# Patient Record
Sex: MALE | Race: WHITE | NOT HISPANIC OR LATINO | Employment: OTHER | URBAN - METROPOLITAN AREA
[De-identification: names, ages, dates, MRNs, and addresses within clinical notes are randomized per-mention and may not be internally consistent; named-entity substitution may affect disease eponyms.]

---

## 2022-04-14 ENCOUNTER — TRANSFERRED RECORDS (OUTPATIENT)
Dept: HEALTH INFORMATION MANAGEMENT | Facility: CLINIC | Age: 72
End: 2022-04-14

## 2022-07-07 ENCOUNTER — REFERRAL (OUTPATIENT)
Dept: TRANSPLANT | Facility: CLINIC | Age: 72
End: 2022-07-07

## 2022-07-07 DIAGNOSIS — Z01.818 PRE-TRANSPLANT EVALUATION FOR KIDNEY TRANSPLANT: ICD-10-CM

## 2022-07-07 DIAGNOSIS — N18.6 END STAGE RENAL DISEASE (H): ICD-10-CM

## 2022-07-07 DIAGNOSIS — I25.10 CARDIOVASCULAR DISEASE: ICD-10-CM

## 2022-07-07 DIAGNOSIS — Z76.82 ORGAN TRANSPLANT CANDIDATE: ICD-10-CM

## 2022-07-07 DIAGNOSIS — I10 ESSENTIAL HYPERTENSION: ICD-10-CM

## 2022-07-07 DIAGNOSIS — E78.5 HYPERLIPIDEMIA: ICD-10-CM

## 2022-07-07 DIAGNOSIS — N18.6 ESRD (END STAGE RENAL DISEASE) (H): Primary | ICD-10-CM

## 2022-07-07 DIAGNOSIS — I12.9 HYPERTENSIVE CHRONIC KIDNEY DISEASE WITH STAGE 1 THROUGH STAGE 4 CHRONIC KIDNEY DISEASE, OR UNSPECIFIED CHRONIC KIDNEY DISEASE: ICD-10-CM

## 2022-07-07 NOTE — LETTER
Ijeoma F Felicitas  [address]  [facility] [address]                July 13, 2022                                                                                        MEDICAL RECORDS REQUEST    [facility]            Thank you for referring your patient to the [facility]alth Kidney, Kidney Pancreas Program, in order to process the referral we will need the following information;    1. Demographics  2. 2728 form   3. Immunizations records  4. Providers Progress notes, (last 3 note)      Please call our office at [phone] if you have any questions or concerns.                Please fax all paper records to [phone] within 3-5 business days.      Thank you,   The SOT Referral Intake Team     [facility]  Solid Organ Transplant Office  [address] [place] [address], [address] [address]

## 2022-07-07 NOTE — LETTER
July 13,2022    Ijeoma Polk  [address] [address] [address]    Dear Ijeoma,    Thank you for your interest in the Transplant Center at [facility]. We look forward to being a part of your care team and assisting you through the transplant process.    As we discussed, your transplant coordinator is Lillie Rogel (Kidney).  You may call your coordinator at any time with questions or concerns at [phone].    Please complete the following.    1. Fill out and return the enclosed forms    Authorization for Electronic Communication    Authorization to Discuss Protected Health Information    Authorization for Release of Protected Health Information    Authorization for Care Everywhere Release of Information    2. Sign up for:    [employer], access to your electronic medical record (see enclosed pamphlet)    [email or web address], a transplant education website    You can use these tools to learn more about your transplant, communicate with your care team, and track your medical details      Sincerely,    Solid Organ Transplant  [facility]    cc: Referring Physician and PCP

## 2022-07-13 VITALS — BODY MASS INDEX: 25 KG/M2 | WEIGHT: 178.57 LBS | HEIGHT: 71 IN

## 2022-07-13 NOTE — TELEPHONE ENCOUNTER
PCP: Dr. Hugo Cohen   Referring Provider:  Dr. Juan Justin   Referring Diagnosis: CKD Stage 5     GFR/Date: 19 (6/3/22)    Is patient under the age of 65? No  Is patient diabetic? No  Is patient on insulin? No  Was patient offered a pancreas transplant referral? No    Is patient in a group home/assisted living? No  Does patient have a guardian? No    Referral intake process completed.  Patient is aware that after financial approval is received, medical records will be requested.   Patient confirmed for a callback from transplant coordinator on 7/25/22. (within 2 weeks)  Tentative evaluation date 8/1/22 (within 4 weeks) if appointment is virtual, does patient have capabilities of setting this up? No    Confirmed coordinator will discuss evaluation process in more detail at the time of their call.   Patient is aware of the need to arrange age appropriate cancer screening, vaccinations, and dental care.  Reminded patient to complete questionnaire, complete medical records release, and review packet prior to evaluation visit .  Assessed patient for special needs (ie-wheelchair, assistance, guardian, and ):  None  Patient instructed to call [phone] with questions.     Patient gave verbal consent during intake call to obtain medical records and documents outside of [facility]:  Yes     KHUSHI Sanchez, LPN       Solid Organ Transplant

## 2022-07-13 NOTE — TELEPHONE ENCOUNTER
Voice Mail- 7/13/2022  [phone]    Pt returning call use [phone] today for a call back  He is home today

## 2022-07-25 NOTE — TELEPHONE ENCOUNTER
Contacted patient and introduced myself as their Transplant Coordinator, also introduced the role of the Transplant Coordinator in the transplant process.  Explained the purpose of this call including reviewing next steps and answering questions.    Confirmed Referring Provider, Dialysis Center, and Primary Care Physician. Notified patient of the importance of continued communication with referring providers and primary care physicians.    Reviewed components of transplant evaluation process including necessary appointments, tests, and procedures.    Answered questions for patient regarding evaluation, provided my name and contact information and requested they call with any additional questions.    Determined that patient would like additional information regarding transplant by:     Drop Down choices: Mail, Email, MyChart, Phone Call   Encourage MyChart   Notified patients that they will hear from a Transplant  to schedule evaluation.       Reviewed pt's chart for pre-kidney transplant evaluation planning. Pt lives in [place], [place]. Pt has ESRD d/t membranous nephropathy and HTN. Pt is on dialysis and follows w/ Dr. Justin. Other hx includes HTN, HLD, a-fib (on Coumadin), and hypothyroidism.   Surgical hx includes gastric bypass (2003), hernia repair, and bowel ressection.  BMI 26. Last colonoscopy was done in 2020.  Dental: not UTD.  Pt is not a smoker, denies any issues w/ alcohol, and recreational drug abuse. Pt is independent w/ ADLs.  Pt lives w/ significant other.  Discussed the need to remain local following surgery.  Pt states his son is a potential donor.  He is vaccinated against COVID-19.    I also introduced [facility] and asked pt to create an account and view pre-kidney transplant videos for review with me following evaluation. Confirmed STD 8/1/22- pt will let me know if he needs to reschedule. Informed pt they will hear from scheduling to arrange the evaluation. Smartset orders  entered, chart routed to scheduling pool.

## 2022-07-26 ENCOUNTER — TELEPHONE (OUTPATIENT)
Dept: TRANSPLANT | Facility: CLINIC | Age: 72
End: 2022-07-26

## 2022-07-26 NOTE — TELEPHONE ENCOUNTER
Pt called to let me know that his son who is his potential donor has a hx of kidney stones.  He wanted to know if that would disqualify him as a donor, we discussed that we will need to review his records, and that the donor team will have his son go through the appropriate work up of that issue before we know if it will rule him out or not.  Ijeoma expressed understanding and had no further questions.

## 2022-07-28 NOTE — PROGRESS NOTES
"[facility] Solid Organ Transplant  Outpatient MNT: Kidney Transplant Evaluation    Current BMI: 25.5 (HT 70 in,  lbs/81 kg)  BMI is within recommendation of <35 for kidney transplant    Frailty Assessment -- Not Frail (2/5 points)- reduced , low activity level       Time Spent: 15 minutes  Visit Type: Initial   Referring Physician: Daljit  Pt accompanied by: self    History of previous txp: none   Dialysis: yes     Dialysis Info: HD 6 months   Protein supplement: doesn't tolerate milk based drinks, same with prostat     Nutrition Assessment  H/o gastric bypass 2003, twisted intestine a few years after and as a result has had some part of intestines removed      - Food allergies/intolerances: none, but avoids steak/dry/tough foods- \"sits heavy\"  - Meal prep & grocery shopping: pt or his friend  - Previous RD education: yes  - Issues chewing or swallowing: will be getting all teeth removed in a few weeks; currently only has partial teeth   - N/V/D/C: diarrhea at baseline since gastric bypass; infrequent nausea  - Food access concerns: no    Vitamins, Supplements, Pertinent Meds: renavite, vit D, vit B12, probiotic, K+, zinc, phoslo (takes consistently; started binder 1 month ago)  Herbal Medicines/Supplements: none     Edema: yes     Weight hx: stable within 10 lbs     Diet Recall  Breakfast Hit or miss- oatmeal w/ small amt of milk or poached eggs    Lunch Grilled cheese and/or soup (canned or homemade)   Dinner Spaghetti; lasagna; chicken w/ BBQ   Snacks Zone bars, fruit, cheese      Beverages On a fluid restriction- 3 bottles water, 2 bottles Mountain Dew, 1 small bottle apple juice    Alcohol 1 beer/week    Dining out Double hamburger (fast food)- up to 3x/week after dialysis      Physical Activity  Was weed whipping last night  Mows yard every few weeks   Walked from [employer] 3-4 blocks this morning     Labs  6/8 K 4.2   No recent phos on file     Nutrition Diagnosis  No nutrition diagnosis " identified at this time     Nutrition Intervention  Nutrition education provided:  Discussed sodium intake (low sodium foods and drinks, seasoning food without salt and tips for low sodium diet).  Reviewed K/Phos labs. Reviewed protein needs with dialysis. Pt reports his dialysis RD encourages him to liberalize his diet to avoid over restriction given past GI surgical hx.     Reviewed post txp diet guidelines in brief (will review in further detail post txp):  (1) Review of proper food safety measures d/t immunosuppressant therapy post-op and increased risk for food-borne illness    (2) Avoid the following post txp d/t risk for rejection, unknown effects on the organs, and/or potential interactions with immunosuppressants:  - Herbal, Chinese, holistic, chiropractic, natural, alternative medicines and supplements  - Detoxes and cleanses  - Weight loss pills  - Protein powders or other products with extracts or herbs (ie green tea extract)    (3) Med regimen and possible side effects    Patient Understanding: Pt verbalized understanding of education provided.  Expected Engagement: Good  Follow-Up Plans: PRN     Nutrition Goals  No nutrition goals identified at this time     Liane Duque, RD, LD, CCTD

## 2022-08-01 ENCOUNTER — APPOINTMENT (OUTPATIENT)
Dept: TRANSPLANT | Facility: CLINIC | Age: 72
End: 2022-08-01
Attending: PHYSICIAN ASSISTANT
Payer: MEDICARE

## 2022-08-01 ENCOUNTER — DOCUMENTATION ONLY (OUTPATIENT)
Dept: TRANSPLANT | Facility: CLINIC | Age: 72
End: 2022-08-01

## 2022-08-01 ENCOUNTER — LAB (OUTPATIENT)
Dept: LAB | Facility: CLINIC | Age: 72
End: 2022-08-01

## 2022-08-01 VITALS
OXYGEN SATURATION: 98 % | HEART RATE: 62 BPM | HEIGHT: 70 IN | BODY MASS INDEX: 25.47 KG/M2 | WEIGHT: 177.9 LBS | SYSTOLIC BLOOD PRESSURE: 95 MMHG | DIASTOLIC BLOOD PRESSURE: 56 MMHG

## 2022-08-01 DIAGNOSIS — Z01.818 PRE-TRANSPLANT EVALUATION FOR KIDNEY TRANSPLANT: ICD-10-CM

## 2022-08-01 DIAGNOSIS — I25.10 CARDIOVASCULAR DISEASE: ICD-10-CM

## 2022-08-01 DIAGNOSIS — I10 ESSENTIAL HYPERTENSION: ICD-10-CM

## 2022-08-01 DIAGNOSIS — N18.6 END STAGE RENAL DISEASE (H): ICD-10-CM

## 2022-08-01 DIAGNOSIS — N02.2 MEMBRANOUS NEPHROPATHY DETERMINED BY BIOPSY: ICD-10-CM

## 2022-08-01 DIAGNOSIS — N18.6 ESRD (END STAGE RENAL DISEASE) (H): ICD-10-CM

## 2022-08-01 DIAGNOSIS — R31.29 HEMATURIA, MICROSCOPIC: ICD-10-CM

## 2022-08-01 DIAGNOSIS — R91.1 PULMONARY NODULE: ICD-10-CM

## 2022-08-01 DIAGNOSIS — Z76.82 ORGAN TRANSPLANT CANDIDATE: ICD-10-CM

## 2022-08-01 DIAGNOSIS — I15.0 RENOVASCULAR HYPERTENSION: ICD-10-CM

## 2022-08-01 DIAGNOSIS — I12.9 HYPERTENSIVE CHRONIC KIDNEY DISEASE WITH STAGE 1 THROUGH STAGE 4 CHRONIC KIDNEY DISEASE, OR UNSPECIFIED CHRONIC KIDNEY DISEASE: ICD-10-CM

## 2022-08-01 DIAGNOSIS — E78.5 HYPERLIPIDEMIA, UNSPECIFIED HYPERLIPIDEMIA TYPE: ICD-10-CM

## 2022-08-01 DIAGNOSIS — E78.5 HYPERLIPIDEMIA: ICD-10-CM

## 2022-08-01 DIAGNOSIS — Z12.5 ENCOUNTER FOR SCREENING FOR MALIGNANT NEOPLASM OF PROSTATE: ICD-10-CM

## 2022-08-01 DIAGNOSIS — E11.21 TYPE 2 DIABETES MELLITUS WITH DIABETIC NEPHROPATHY, WITHOUT LONG-TERM CURRENT USE OF INSULIN (H): ICD-10-CM

## 2022-08-01 DIAGNOSIS — K52.9 CHRONIC DIARRHEA: ICD-10-CM

## 2022-08-01 DIAGNOSIS — E78.2 MIXED HYPERLIPIDEMIA: ICD-10-CM

## 2022-08-01 DIAGNOSIS — I15.1 HYPERTENSION SECONDARY TO OTHER RENAL DISORDERS (CODE): ICD-10-CM

## 2022-08-01 DIAGNOSIS — N18.6 ESRD (END STAGE RENAL DISEASE) (H): Primary | ICD-10-CM

## 2022-08-01 LAB
A1 AB TITR SERPL: 128 {TITER}
A1 AB TITR SERPL: 32 {TITER}
ABO/RH(D): NORMAL
ABO/RH(D): NORMAL
ALBUMIN SERPL-MCNC: 3.1 G/DL (ref 3.4–5)
ALBUMIN UR-MCNC: 300 MG/DL
ALP SERPL-CCNC: 82 U/L (ref 40–150)
ALT SERPL W P-5'-P-CCNC: 34 U/L (ref 0–70)
ANION GAP SERPL CALCULATED.3IONS-SCNC: 10 MMOL/L (ref 3–14)
ANTIBODY SCREEN: NEGATIVE
ANTIBODY TITER IGM SCREEN: NEGATIVE
APPEARANCE UR: CLEAR
APTT PPP: 35 SECONDS (ref 22–38)
AST SERPL W P-5'-P-CCNC: 31 U/L (ref 0–45)
B IGG TITR SERPL: 64 {TITER}
B IGM TITR SERPL: 32 {TITER}
BASOPHILS # BLD AUTO: 0.1 10E3/UL (ref 0–0.2)
BASOPHILS NFR BLD AUTO: 1 %
BILIRUB SERPL-MCNC: 0.4 MG/DL (ref 0.2–1.3)
BILIRUB UR QL STRIP: NEGATIVE
BUN SERPL-MCNC: 51 MG/DL (ref 7–30)
CALCIUM SERPL-MCNC: 8.8 MG/DL (ref 8.5–10.1)
CHLORIDE BLD-SCNC: 98 MMOL/L (ref 94–109)
CO2 SERPL-SCNC: 28 MMOL/L (ref 20–32)
COLOR UR AUTO: YELLOW
CREAT SERPL-MCNC: 3.74 MG/DL (ref 0.66–1.25)
EOSINOPHIL # BLD AUTO: 0.1 10E3/UL (ref 0–0.7)
EOSINOPHIL NFR BLD AUTO: 2 %
ERYTHROCYTE [DISTWIDTH] IN BLOOD BY AUTOMATED COUNT: 16.7 % (ref 10–15)
FACTOR 2 INTERPRETATION: NORMAL
FACTOR V INTERPRETATION: NORMAL
GFR SERPL CREATININE-BSD FRML MDRD: 17 ML/MIN/1.73M2
GLUCOSE BLD-MCNC: 181 MG/DL (ref 70–99)
GLUCOSE UR STRIP-MCNC: NEGATIVE MG/DL
HBA1C MFR BLD: 4.8 % (ref 0–5.6)
HCT VFR BLD AUTO: 33.2 % (ref 40–53)
HGB BLD-MCNC: 10.9 G/DL (ref 13.3–17.7)
HGB UR QL STRIP: ABNORMAL
IMM GRANULOCYTES # BLD: 0 10E3/UL
IMM GRANULOCYTES NFR BLD: 1 %
INR PPP: 1.66 (ref 0.85–1.15)
KETONES UR STRIP-MCNC: NEGATIVE MG/DL
LAB DIRECTOR COMMENTS: NORMAL
LAB DIRECTOR DISCLAIMER: NORMAL
LAB DIRECTOR INTERPRETATION: NORMAL
LAB DIRECTOR METHODOLOGY: NORMAL
LAB DIRECTOR RESULTS: NORMAL
LEUKOCYTE ESTERASE UR QL STRIP: NEGATIVE
LYMPHOCYTES # BLD AUTO: 1.1 10E3/UL (ref 0.8–5.3)
LYMPHOCYTES NFR BLD AUTO: 14 %
MCH RBC QN AUTO: 32.9 PG (ref 26.5–33)
MCHC RBC AUTO-ENTMCNC: 32.8 G/DL (ref 31.5–36.5)
MCV RBC AUTO: 100 FL (ref 78–100)
MONOCYTES # BLD AUTO: 0.5 10E3/UL (ref 0–1.3)
MONOCYTES NFR BLD AUTO: 7 %
MUCOUS THREADS #/AREA URNS LPF: PRESENT /LPF
NEUTROPHILS # BLD AUTO: 5.8 10E3/UL (ref 1.6–8.3)
NEUTROPHILS NFR BLD AUTO: 75 %
NITRATE UR QL: NEGATIVE
NRBC # BLD AUTO: 0 10E3/UL
NRBC BLD AUTO-RTO: 0 /100
PH UR STRIP: 6 [PH] (ref 5–7)
PLATELET # BLD AUTO: 220 10E3/UL (ref 150–450)
POTASSIUM BLD-SCNC: 2.9 MMOL/L (ref 3.4–5.3)
PROT SERPL-MCNC: 7 G/DL (ref 6.8–8.8)
PSA SERPL-MCNC: 0.13 UG/L (ref 0–4)
RBC # BLD AUTO: 3.31 10E6/UL (ref 4.4–5.9)
RBC URINE: 19 /HPF
SODIUM SERPL-SCNC: 136 MMOL/L (ref 133–144)
SP GR UR STRIP: 1.02 (ref 1–1.03)
SPECIMEN DESCRIPTION: NORMAL
SPECIMEN EXPIRATION DATE: NORMAL
T PALLIDUM AB SER QL: NONREACTIVE
UROBILINOGEN UR STRIP-MCNC: NORMAL MG/DL
WBC # BLD AUTO: 7.6 10E3/UL (ref 4–11)
WBC URINE: 1 /HPF

## 2022-08-01 PROCEDURE — 86481 TB AG RESPONSE T-CELL SUSP: CPT

## 2022-08-01 PROCEDURE — 99215 OFFICE O/P EST HI 40 MIN: CPT

## 2022-08-01 PROCEDURE — 86706 HEP B SURFACE ANTIBODY: CPT

## 2022-08-01 PROCEDURE — 85670 THROMBIN TIME PLASMA: CPT

## 2022-08-01 PROCEDURE — 86832 HLA CLASS I HIGH DEFIN QUAL: CPT

## 2022-08-01 PROCEDURE — 85014 HEMATOCRIT: CPT

## 2022-08-01 PROCEDURE — 86704 HEP B CORE ANTIBODY TOTAL: CPT

## 2022-08-01 PROCEDURE — 86901 BLOOD TYPING SEROLOGIC RH(D): CPT

## 2022-08-01 PROCEDURE — 81378 HLA I & II TYPING HR: CPT

## 2022-08-01 PROCEDURE — 87340 HEPATITIS B SURFACE AG IA: CPT

## 2022-08-01 PROCEDURE — 86665 EPSTEIN-BARR CAPSID VCA: CPT

## 2022-08-01 PROCEDURE — 86833 HLA CLASS II HIGH DEFIN QUAL: CPT

## 2022-08-01 PROCEDURE — 99204 OFFICE O/P NEW MOD 45 MIN: CPT

## 2022-08-01 PROCEDURE — 86850 RBC ANTIBODY SCREEN: CPT

## 2022-08-01 PROCEDURE — 36415 COLL VENOUS BLD VENIPUNCTURE: CPT

## 2022-08-01 PROCEDURE — 84681 ASSAY OF C-PEPTIDE: CPT

## 2022-08-01 PROCEDURE — 85610 PROTHROMBIN TIME: CPT

## 2022-08-01 PROCEDURE — 83036 HEMOGLOBIN GLYCOSYLATED A1C: CPT

## 2022-08-01 PROCEDURE — 85730 THROMBOPLASTIN TIME PARTIAL: CPT

## 2022-08-01 PROCEDURE — 86803 HEPATITIS C AB TEST: CPT

## 2022-08-01 PROCEDURE — 86780 TREPONEMA PALLIDUM: CPT

## 2022-08-01 PROCEDURE — 86644 CMV ANTIBODY: CPT

## 2022-08-01 PROCEDURE — 86147 CARDIOLIPIN ANTIBODY EA IG: CPT

## 2022-08-01 PROCEDURE — G0103 PSA SCREENING: HCPCS

## 2022-08-01 PROCEDURE — 86787 VARICELLA-ZOSTER ANTIBODY: CPT

## 2022-08-01 PROCEDURE — 80053 COMPREHEN METABOLIC PANEL: CPT

## 2022-08-01 PROCEDURE — 99417 PROLNG OP E/M EACH 15 MIN: CPT

## 2022-08-01 PROCEDURE — 82040 ASSAY OF SERUM ALBUMIN: CPT

## 2022-08-01 PROCEDURE — 86886 COOMBS TEST INDIRECT TITER: CPT

## 2022-08-01 PROCEDURE — 81001 URINALYSIS AUTO W/SCOPE: CPT

## 2022-08-01 PROCEDURE — 81240 F2 GENE: CPT

## 2022-08-01 RX ORDER — VIT B COMP NO.3/FOLIC/C/BIOTIN 1 MG-60 MG
1 TABLET ORAL DAILY
COMMUNITY
Start: 2022-06-06

## 2022-08-01 RX ORDER — BETAMETHASONE DIPROPIONATE 0.5 MG/G
CREAM TOPICAL
COMMUNITY
Start: 2021-01-08 | End: 2024-01-01

## 2022-08-01 RX ORDER — OMEPRAZOLE 20 MG/1
20 TABLET, DELAYED RELEASE ORAL 2 TIMES DAILY
COMMUNITY
Start: 2021-12-07

## 2022-08-01 RX ORDER — LEVOTHYROXINE SODIUM 50 UG/1
1 TABLET ORAL DAILY
COMMUNITY
Start: 2021-09-02

## 2022-08-01 RX ORDER — TORSEMIDE 10 MG/1
TABLET ORAL
COMMUNITY
Start: 2022-04-04 | End: 2024-01-01

## 2022-08-01 RX ORDER — LOPERAMIDE HYDROCHLORIDE 2 MG/1
2 TABLET ORAL
COMMUNITY
Start: 2021-08-11 | End: 2024-01-01

## 2022-08-01 RX ORDER — WARFARIN SODIUM 5 MG/1
5 TABLET ORAL DAILY
COMMUNITY
Start: 2021-09-08 | End: 2024-01-01 | Stop reason: DRUGHIGH

## 2022-08-01 RX ORDER — ATORVASTATIN CALCIUM 10 MG/1
TABLET, FILM COATED ORAL
COMMUNITY
Start: 2022-06-29

## 2022-08-01 RX ORDER — SUCRALFATE 1 G/1
1 TABLET ORAL
COMMUNITY
Start: 2022-01-20 | End: 2024-01-01

## 2022-08-01 RX ORDER — VIT C/HESPERIDIN/BIOFLAVONOIDS 500-100 MG
1 TABLET ORAL
COMMUNITY
Start: 2021-04-06 | End: 2024-01-01

## 2022-08-01 RX ORDER — CALCIUM ACETATE 667 MG/1
CAPSULE ORAL
COMMUNITY
Start: 2022-05-10 | End: 2024-01-01

## 2022-08-01 RX ORDER — DIPHENOXYLATE HCL/ATROPINE 2.5-.025MG
1 TABLET ORAL
COMMUNITY
Start: 2022-04-27 | End: 2024-01-01

## 2022-08-01 RX ORDER — POTASSIUM CHLORIDE 1500 MG/1
TABLET, EXTENDED RELEASE ORAL
COMMUNITY
Start: 2022-06-24 | End: 2024-01-01

## 2022-08-01 RX ORDER — METOLAZONE 2.5 MG/1
2.5 TABLET ORAL
COMMUNITY
Start: 2022-05-23 | End: 2024-01-01

## 2022-08-01 RX ORDER — OFLOXACIN 3 MG/ML
SOLUTION/ DROPS OPHTHALMIC
COMMUNITY
Start: 2022-06-28 | End: 2024-01-01

## 2022-08-01 RX ORDER — UREA 40 %
CREAM (GRAM) TOPICAL
COMMUNITY
Start: 2022-06-29 | End: 2024-01-01

## 2022-08-01 RX ORDER — DIGOXIN 125 MCG
1 TABLET ORAL DAILY
COMMUNITY
Start: 2021-11-29 | End: 2024-01-01

## 2022-08-01 RX ORDER — TRIAMCINOLONE ACETONIDE 0.25 MG/G
OINTMENT TOPICAL
COMMUNITY
Start: 2020-09-25 | End: 2024-01-01

## 2022-08-01 RX ORDER — METOPROLOL SUCCINATE 50 MG/1
50 TABLET, EXTENDED RELEASE ORAL AT BEDTIME
COMMUNITY
Start: 2024-01-01

## 2022-08-01 RX ORDER — ERGOCALCIFEROL 1.25 MG/1
CAPSULE, LIQUID FILLED ORAL
COMMUNITY
Start: 2021-10-28 | End: 2024-01-01

## 2022-08-01 NOTE — PROGRESS NOTES
Kidney Transplant Referral - 7/7/2022   Patient attended appointments alone  Patient completed AM appointments with all [facility] providers.  Time and location of PM appointments reviewed with patient.  Echocardiogram canceled -done 6/22  Patient instructed next contact from Transplant Coordinator will be following Selection Committee  Patient stated understanding  KDPI form signed and faxed to HIM  Patient has not watched [facility] Pre Kidney Transplant videos 1&2.  Demonstrated accessing [facility] and watching Pre Kidney Transplant videos 1&2.        Summary    Team s concerns/comments:   1) Cardiac risk assessment  2) PAD assessment  3) Hematuria  4) Pulmonary nodule  5) S/P gastric bypass  6) Chronic diarrhea  7) Health maintenance    Candidacy category: Yellow    Action/Plan:   1) EKG today. Echocardiogram canceled. Completed 6/20/22 (Neha)  2) A/P CT without contrast  3) Urology consult  4) Noted today. Low dose chest CT  5) Current BMI within criteria  6) GI consult  7) Dental due    Expected Selection Meeting Discussion: 8/10/22

## 2022-08-01 NOTE — LETTER
8/1/2022         RE: Ijeoma Polk  [address] [place] [address]        Dear Colleague,    Thank you for referring your patient, Ijeoma Polk, to the [facility]PL[facility]. Please see a copy of my visit note below.    TRANSPLANT NEPHROLOGY RECIPIENT EVALUATION NOTE    Assessment and Plan:  # Kidney Transplant Evaluation: Patient is a fair candidate overall. Benefits of a living donor transplant were discussed.    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  2) Increased global glomerulosclerosis in the setting of glomerulomegaly. 3) Mild to moderate arteriosclerosis and mild arteriolar hyalinosis.    Received one dose of Ritux, but ultimately started hemodialysis in 2/2022. Has a potential donor. Although doing okay on dialysis, he may benefit from a kidney transplant.    # DM Type 2: diet controlled since gastric bypass.     # Cardiac Risk:  h/o a-fib (on Coumadin), 6/2022 outside ECHO with normal LVEF, no significant valvular abnormalities. Would need risk assessment.     # PAD Screening: would need CT and iliacs.     # Gastric bypass (Martha-en-Y), 2003: with almost 200 lbs of total weight loss, current BMI 25.     # Bowel resection (2007), subsequent chronic diarrhea: records not available, reports diarrhea 1-2 times daily,  mostly loose, occasionally watery, takes imodium daily. Recommend GI assessment and MMF trial.     # Pulmonary nodule: noted on CXR today, will need CT.     # Hematuria: needs urology.     # Health Maintenance: Colonoscopy: Up to date and Dental: Not up to date    Discussed the risks and benefits of a transplant, including the risk of surgery and immunosuppression medications.  Patient's overall evaluation will be discussed in the Transplant Program's regular meeting with a final recommendation on the patients suitability for transplant to be made at that time.    Pending completion of the full  evaluation, patient presently appears to be enough of an acceptable kidney transplant recipient candidate to have any potential kidney donors start the evaluation process.      Evaluation:  Ijeoma Polk was seen in consultation at the request of Dr. Bocanegra  for evaluation as a potential kidney transplant recipient.    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    History of Present Illness:  Ijeoma Polk is a 71-year-old gentleman with history of ESKD d/t membranous nephropathy. Known CKD since 10/2021 when he presented with GFR of 42 ml/min and marked hypoalbuminemia, microscopic hematuria, severe nephrotic range proteinuria of 15.5 grams. Serologic work up included significant findings were SHANICE 1.59, normal Serum ELP and normal PLA2R. Ultimately had a biopsy that month that revealed membranous nephropathy. CT a/p did not reveal any concerning tumors. He was given 1 gram of Rituximab on 1/10/22, but ended up starting  dialysis in 2/2022 Overall, feeling well. He is independent with ADLs and drives himself 60 miles to dialysis 3 times a week.            Kidney Disease Hx:           Kidney Disease Dx: Membranous, HTN, NSAIDs       Biopsy Proven: 11/2021     Renal biopsy 11/19/21: 1) Membranous glomerulopathy, stage I-II.  2) Increased global glomerulosclerosis in the setting of glomerulomegaly. 3) Mild to moderate arteriosclerosis and mild arteriolar hyalinosis.             On Dialysis: Yes, Date initiated: 2/2022 and Dialysis Type: [facility];       Primary Nephrologist: Dr. Justin        H/o Kidney Stones: No       H/o Recurrent/Frequent UTI: No         Diabetic Hx: Type 2        Diagnosis Date: late 40s       Medication History: on metformin for a few years, later insulin for 1 or 2 years, back on oral for a few weeks after gastric bypass then stopped.        Diabetic Control: Controlled (HbA1c <7%)   Last  "HbA1c: 4.8%       Hypoglycemic Unawareness: No       End-Organ Damage due to DM: Nephropathy          Cardiac/Vascular Disease Risk Factors:        Cardiac Risk Factors: Diabetes, Hypertension, CKD and Age (Male > 55, Female > 65)       Known CAD: No       Known PAD/Caludication Symptoms: No, EF 55-60% in 2022        Known Heart Failure: No       Arrhythmia: a-fib (on Coumadin)       Pulmonary Hypertension: No       Valvular Disease: No       Other: None         Viral Serology Status       CMV IgG Antibody: Unknown       EBV IgG Antibody: Unknown         Volume Status/Weight:        Volume status: Euvolemic       Weight:  Acceptable BMI       BMI: Body mass index is 25.53 kg/m .          Functional Capacity/Frailty:        Living with a friend as his wife  a few years ago. Drives himself 58 miles to dialysis. He loves to spend time walking around [place]. 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 Mows his lawn with rider covering  2.5 acres. Denies exertional sxs with stairs. Has his own INR machine at home.     Fatigue/Decreased Energy: [] No [x] Yes Some decrease in recent months, but \"tries to stay busy\"   Chest Pain or SOB with Exertion: [x] No [] Yes    Significant Weight Change: [] No [x] Yes Lost 200 lbs after gastric bypass   Nausea, Vomiting or Diarrhea: [] No [x] Yes Chronic diarrhea   Fever, Sweats or Chills:  [x] No [] Yes    Leg Swelling [x] No [] Yes        History of Cancer: None    Other Significant Medical Issues:   # Gastric bypass : with almost 200 lbs of total weight loss, current BMI 25.   #  Bowel resection (), Chronic diarrhea: records not available, reports diarrhea 1-2 times daily,  mostly loose, occasionally watery, takes imodium daily.       # COVID Vaccination Up To Date: Yes    Potential Living Kidney Donors: Yes, his son     Review of Systems:  A comprehensive review of systems was obtained and negative, except as noted in the HPI or PMH.    Past " Medical History:   Medical record was reviewed and PMH was discussed with patient and noted below.  No past medical history on file.    Past Social History:   No past surgical history on file.  Personal history of bleeding or anesthesia problems: No    Family History:  No family history on file.    Personal History:   Social History     Socioeconomic History     Marital status:      Spouse name: Not on file     Number of children: Not on file     Years of education: Not on file     Highest education level: Not on file   Occupational History     Not on file   Tobacco Use     Smoking status: Never Smoker     Smokeless tobacco: Never Used   Substance and Sexual Activity     Alcohol use: Not Currently     Drug use: Never     Sexual activity: Not on file   Other Topics Concern     Not on file   Social History Narrative     Not on file     Social Determinants of Health     Financial Resource Strain: Not on file   Food Insecurity: Not on file   Transportation Needs: Not on file   Physical Activity: Not on file   Stress: Not on file   Social Connections: Not on file   Intimate Partner Violence: Not on file   Housing Stability: Not on file       Allergies:  Allergies   Allergen Reactions     Furosemide Hives     Lorazepam Rash     Penicillins Rash     Sulfadiazine Rash       Medications:  Current Outpatient Medications   Medication Sig     augmented betamethasone dipropionate (DIPROLENE AF) 0.05 % external cream      cholecalciferol 125 MCG (5000 UT) CAPS Take 5,000 Units by mouth     digoxin (LANOXIN) 125 MCG tablet Take 1 tablet by mouth daily     diphenoxylate-atropine (LOMOTIL) 2.5-0.025 MG tablet Take 1 tablet by mouth     Lactobacillus Rhamnosus, GG, ( PROBIOTIC DIGESTIVE CARE) CAPS Take 2 capsules by mouth     levothyroxine (EUTHYROX) 50 MCG tablet Take 1 tablet by mouth daily     loperamide (IMODIUM A-D) 2 MG tablet Take 2 mg by mouth     metolazone (ZAROXOLYN) 2.5 MG tablet Take 2.5 mg by mouth      "metoprolol succinate ER (TOPROL XL) 50 MG 24 hr tablet Take 50 mg by mouth     ofloxacin (OCUFLOX) 0.3 % ophthalmic solution Place 5 drops in right ear once daily for 7 days     omeprazole (PRILOSEC OTC) 20 MG EC tablet Take 20 mg by mouth     sucralfate (CARAFATE) 1 GM tablet Take 1 g by mouth     torsemide (DEMADEX) 10 MG tablet Take 3 tablets by mouth in the morning and 1 tablet in the evening.     triamcinolone (KENALOG) 0.025 % external ointment Apply thin layer twice daily for up to two weeks, then on weekends only, as needed, for up to six weeks. The course can be repeated as needed. For use on the head, neck, skin folds, or genitalia.     Urea 40 % CREA      warfarin ANTICOAGULANT (COUMADIN) 5 MG tablet Take 1 to 1 1/2 tabs po daily or as directed per [facility]     Zinc 30 MG TABS Take 1 each by mouth     atorvastatin (LIPITOR) 10 MG tablet TAKE 1 TABLET BY MOUTH ONCE DAILY AT BEDTIME     B Complex-C-Folic Acid (TATY-BRIT RX) 1 mg TABS Take 1 tablet by mouth daily     calcium acetate (PHOSLO) 667 MG CAPS capsule TAKE 1 CAPSULE BY MOUTH THREE TIMES DAILY WITH FOOD . DO NOT TAKE ON AN EMPTY STOMACH. MUST BE TAKEN WITH FOOD TO BE EFFECTIVE     cyanocobalamin (VITAMIN B-12) 1000 MCG SUBL sublingual tablet Place 1,000 mcg under the tongue     potassium chloride ER (K-TAB) 20 MEQ CR tablet TAKE 1 TABLET BY MOUTH TWICE DAILY WITH MEALS . DO NOT CRUSH .     vitamin D2 (ERGOCALCIFEROL) 11181 units (1250 mcg) capsule TAKE 1 CAPSULE BY MOUTH ON MONDAY WEDNESDAY AND FRIDAY     No current facility-administered medications for this visit.       Vitals:  BP 95/56   Pulse 62   Ht 1.778 m (5' 10\")   Wt 80.7 kg (177 lb 14.4 oz)   SpO2 98%   BMI 25.53 kg/m      Exam:  GENERAL APPEARANCE: alert and no distress  HENT: mouth without ulcers or lesions  LYMPHATICS: no cervical or supraclavicular nodes  RESP: lungs clear to auscultation - no rales, rhonchi or wheezes  CV: regular rhythm, normal rate, no rub, no " murmur  FEMORAL PULSES: normal  EDEMA: no LE edema bilaterally  ABDOMEN: soft, nondistended, nontender, bowel sounds normal  MS: extremities normal - no gross deformities noted, no evidence of inflammation in joints, no muscle tenderness  SKIN: no rash    Results:   No results found for this or any previous visit (from the past 336 hour(s)).    Review of prior external note(s) from - CareEverywhere information from [facility] reviewed  I spent a total of 98 minutes on the day of the visit.   Time spent doing chart review, history and exam, documentation and further activities per the note  Again, thank you for allowing me to participate in the care of your patient.        Sincerely,        MIGUEL

## 2022-08-01 NOTE — PROGRESS NOTES
TRANSPLANT NEPHROLOGY RECIPIENT EVALUATION NOTE    Assessment and Plan:  # Kidney Transplant Evaluation: Patient is a fair candidate overall. Benefits of a living donor transplant were discussed.    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  2) Increased global glomerulosclerosis in the setting of glomerulomegaly. 3) Mild to moderate arteriosclerosis and mild arteriolar hyalinosis.    Received one dose of Ritux, but ultimately started hemodialysis in 2/2022. Has a potential donor. Although doing okay on dialysis, he may benefit from a kidney transplant.    # DM Type 2: diet controlled since gastric bypass.     # Cardiac Risk:  h/o a-fib (on Coumadin), 6/2022 outside [facility] with normal LVEF, no significant valvular abnormalities. Would need risk assessment.     # PAD Screening: would need CT and iliacs.     # Gastric bypass (Martha-en-Y), 2003: with almost 200 lbs of total weight loss, current BMI 25.     # Bowel resection (2007), subsequent chronic diarrhea: records not available, reports diarrhea 1-2 times daily,  mostly loose, occasionally watery, takes imodium daily. Recommend GI assessment and MMF trial.     # Pulmonary nodule: noted on CXR today, will need CT.     # Hematuria: needs urology.     # Health Maintenance: Colonoscopy: Up to date and Dental: Not up to date    Discussed the risks and benefits of a transplant, including the risk of surgery and immunosuppression medications.  Patient's overall evaluation will be discussed in the Transplant Program's regular meeting with a final recommendation on the patients suitability for transplant to be made at that time.    Pending completion of the full evaluation, patient presently appears to be enough of an acceptable kidney transplant recipient candidate to have any potential kidney donors start the evaluation process.      Evaluation:  Ijeoma Polk was seen in consultation at the request of   Daljit  for evaluation as a potential kidney transplant recipient.    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    History of Present Illness:  Ijeoma Polk is a 71-year-old gentleman with history of ESKD d/t membranous nephropathy. Known CKD since 10/2021 when he presented with GFR of 42 ml/min and marked hypoalbuminemia, microscopic hematuria, severe nephrotic range proteinuria of 15.5 grams. Serologic work up included significant findings were SHANICE 1.59, normal Serum ELP and normal PLA2R. Ultimately had a biopsy that month that revealed membranous nephropathy. CT a/p did not reveal any concerning tumors. He was given 1 gram of Rituximab on 1/10/22, but ended up starting  dialysis in 2/2022 Overall, feeling well. He is independent with ADLs and drives himself 60 miles to dialysis 3 times a week.            Kidney Disease Hx:           Kidney Disease Dx: Membranous, HTN, NSAIDs       Biopsy Proven: 11/2021     Renal biopsy 11/19/21: 1) Membranous glomerulopathy, stage I-II.  2) Increased global glomerulosclerosis in the setting of glomerulomegaly. 3) Mild to moderate arteriosclerosis and mild arteriolar hyalinosis.             On Dialysis: Yes, Date initiated: 2/2022 and Dialysis Type: [facility];       Primary Nephrologist: Dr. Justin        H/o Kidney Stones: No       H/o Recurrent/Frequent UTI: No         Diabetic Hx: Type 2        Diagnosis Date: late 40s       Medication History: on metformin for a few years, later insulin for 1 or 2 years, back on oral for a few weeks after gastric bypass then stopped.        Diabetic Control: Controlled (HbA1c <7%)   Last HbA1c: 4.8%       Hypoglycemic Unawareness: No       End-Organ Damage due to DM: Nephropathy          Cardiac/Vascular Disease Risk Factors:        Cardiac Risk Factors: Diabetes, Hypertension, CKD and Age (Male > 55, Female > 65)       Known CAD: No     "   Known PAD/Caludication Symptoms: No, EF 55-60% in 2022        Known Heart Failure: No       Arrhythmia: a-fib (on Coumadin)       Pulmonary Hypertension: No       Valvular Disease: No       Other: None         Viral Serology Status       CMV IgG Antibody: Unknown       EBV IgG Antibody: Unknown         Volume Status/Weight:        Volume status: Euvolemic       Weight:  Acceptable BMI       BMI: Body mass index is 25.53 kg/m .          Functional Capacity/Frailty:        Living with a friend as his wife  a few years ago. Drives himself 58 miles to dialysis. He loves to spend time walking around [employer]. 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 Mows his lawn with rider covering  2.5 acres. Denies exertional sxs with stairs. Has his own INR machine at home.     Fatigue/Decreased Energy: [] No [x] Yes Some decrease in recent months, but \"tries to stay busy\"   Chest Pain or SOB with Exertion: [x] No [] Yes    Significant Weight Change: [] No [x] Yes Lost 200 lbs after gastric bypass   Nausea, Vomiting or Diarrhea: [] No [x] Yes Chronic diarrhea   Fever, Sweats or Chills:  [x] No [] Yes    Leg Swelling [x] No [] Yes        History of Cancer: None    Other Significant Medical Issues:   # Gastric bypass : with almost 200 lbs of total weight loss, current BMI 25.   #  Bowel resection (), Chronic diarrhea: records not available, reports diarrhea 1-2 times daily,  mostly loose, occasionally watery, takes imodium daily.       # COVID Vaccination Up To Date: Yes    Potential Living Kidney Donors: Yes, his son     Review of Systems:  A comprehensive review of systems was obtained and negative, except as noted in the HPI or PMH.    Past Medical History:   Medical record was reviewed and PMH was discussed with patient and noted below.  No past medical history on file.    Past Social History:   No past surgical history on file.  Personal history of bleeding or anesthesia problems: " No    Family History:  No family history on file.    Personal History:   Social History     Socioeconomic History     Marital status:      Spouse name: Not on file     Number of children: Not on file     Years of education: Not on file     Highest education level: Not on file   Occupational History     Not on file   Tobacco Use     Smoking status: Never Smoker     Smokeless tobacco: Never Used   Substance and Sexual Activity     Alcohol use: Not Currently     Drug use: Never     Sexual activity: Not on file   Other Topics Concern     Not on file   Social History Narrative     Not on file     Social Determinants of Health     Financial Resource Strain: Not on file   Food Insecurity: Not on file   Transportation Needs: Not on file   Physical Activity: Not on file   Stress: Not on file   Social Connections: Not on file   Intimate Partner Violence: Not on file   Housing Stability: Not on file       Allergies:  Allergies   Allergen Reactions     Furosemide Hives     Lorazepam Rash     Penicillins Rash     Sulfadiazine Rash       Medications:  Current Outpatient Medications   Medication Sig     augmented betamethasone dipropionate (DIPROLENE AF) 0.05 % external cream      cholecalciferol 125 MCG (5000 UT) CAPS Take 5,000 Units by mouth     digoxin (LANOXIN) 125 MCG tablet Take 1 tablet by mouth daily     diphenoxylate-atropine (LOMOTIL) 2.5-0.025 MG tablet Take 1 tablet by mouth     Lactobacillus Rhamnosus, GG, (RA PROBIOTIC DIGESTIVE CARE) CAPS Take 2 capsules by mouth     levothyroxine (EUTHYROX) 50 MCG tablet Take 1 tablet by mouth daily     loperamide (IMODIUM A-D) 2 MG tablet Take 2 mg by mouth     metolazone (ZAROXOLYN) 2.5 MG tablet Take 2.5 mg by mouth     metoprolol succinate ER (TOPROL XL) 50 MG 24 hr tablet Take 50 mg by mouth     ofloxacin (OCUFLOX) 0.3 % ophthalmic solution Place 5 drops in right ear once daily for 7 days     omeprazole (PRILOSEC OTC) 20 MG EC tablet Take 20 mg by mouth     sucralfate  "(CARAFATE) 1 GM tablet Take 1 g by mouth     torsemide (DEMADEX) 10 MG tablet Take 3 tablets by mouth in the morning and 1 tablet in the evening.     triamcinolone (KENALOG) 0.025 % external ointment Apply thin layer twice daily for up to two weeks, then on weekends only, as needed, for up to six weeks. The course can be repeated as needed. For use on the head, neck, skin folds, or genitalia.     Urea 40 % CREA      warfarin ANTICOAGULANT (COUMADIN) 5 MG tablet Take 1 to 1 1/2 tabs po daily or as directed per [facility]     Zinc 30 MG TABS Take 1 each by mouth     atorvastatin (LIPITOR) 10 MG tablet TAKE 1 TABLET BY MOUTH ONCE DAILY AT BEDTIME     B Complex-C-Folic Acid (TATY-BRIT RX) 1 mg TABS Take 1 tablet by mouth daily     calcium acetate (PHOSLO) 667 MG CAPS capsule TAKE 1 CAPSULE BY MOUTH THREE TIMES DAILY WITH FOOD . DO NOT TAKE ON AN EMPTY STOMACH. MUST BE TAKEN WITH FOOD TO BE EFFECTIVE     cyanocobalamin (VITAMIN B-12) 1000 MCG SUBL sublingual tablet Place 1,000 mcg under the tongue     potassium chloride ER (K-TAB) 20 MEQ CR tablet TAKE 1 TABLET BY MOUTH TWICE DAILY WITH MEALS . DO NOT CRUSH .     vitamin D2 (ERGOCALCIFEROL) 01213 units (1250 mcg) capsule TAKE 1 CAPSULE BY MOUTH ON MONDAY WEDNESDAY AND FRIDAY     No current facility-administered medications for this visit.       Vitals:  BP 95/56   Pulse 62   Ht 1.778 m (5' 10\")   Wt 80.7 kg (177 lb 14.4 oz)   SpO2 98%   BMI 25.53 kg/m      Exam:  GENERAL APPEARANCE: alert and no distress  HENT: mouth without ulcers or lesions  LYMPHATICS: no cervical or supraclavicular nodes  RESP: lungs clear to auscultation - no rales, rhonchi or wheezes  CV: regular rhythm, normal rate, no rub, no murmur  FEMORAL PULSES: normal  EDEMA: no LE edema bilaterally  ABDOMEN: soft, nondistended, nontender, bowel sounds normal  MS: extremities normal - no gross deformities noted, no evidence of inflammation in joints, no muscle tenderness  SKIN: no rash    Results:   No " results found for this or any previous visit (from the past 336 hour(s)).    Review of prior external note(s) from - CareEverywhere information from [facility] reviewed  I spent a total of 98 minutes on the day of the visit.   Time spent doing chart review, history and exam, documentation and further activities per the note

## 2022-08-01 NOTE — PROGRESS NOTES
Psychosocial Assessment  Patient Name/ Age: Ijeoma Polk 71 year old   Medical Record #: [id number]  Duration of Interview:     40  min  Process:   Face-to-Face Interview                (counseling < 50%)   Present at Appointment: Ijeoma        :ANGI Cardona, [facility] Date:  August 1, 2022        Type of transplant: Kidney    Donor type:   Ijeoma indicated his son has expressed interest in being a donor.   Cadaver and daughter/son   Prior Transplants:    No Status of Transplant:       Current Living Situation    Location:   [address] [place] [address] [address]  [place] [phone]  With Whom: lives alone       Family/ Social Support:    Ijeoma's son Prateek (45) lives in [place], [place].     Available, helpful   Committed relationship:  Ijeoma indicated he is  and does not consider this person to be a part of his support system.   Single   Other supports:   Friends Available, helpful       Activities/ Functional Ability    Current level:  Ijeoma wears corrective lenses. Ambulatory, visually impaired and cognitively impaired     Transportation Drives self       Vocational/Employment/Financial     Employment   Retired   Job Description      Income   [employer]   Insurance  Medicare, BCBS Medicare Supplement and Part D through [employer].  Ijeoma indicated he pays his own premiums.    At this time, patient can afford medication costs:  Yes  Medicare       Medical Status    Current mode of treatment for ESRD Dialysis   Complications - Ijeoma said he was a Type II diabetic prior to his bariatric surgery, but not after. None       Behavioral    Tobacco Use No Chemical Dependency No    Ijeoma indicated he has one beer a week.     Psychiatric Impairment No    Reading ability Good  Education Level: Technical College Recent Legal History No    Coping Style/Strategies: Ijeoma indicated he does not have any stress.     Ability to Adhere to Complex Medical Regime: Yes     Adherence History:  Ijeoma indicated he will usually follow  his physician's recommendations.         Education  _X_ Medicare  _X_ Rehabilitation  _X_ Donor issues  _X_ Community resources  _X_ Post discharge housing  _X_ Financial resources  _X_ Medical insurance options  _X_ Psych adjustment  _X_ Family adjustment  _X_ Health Care Directive - Yes, Will Provide Psychosocial Risks of Transplant Reviewed and Discussed:  _X_ Increased stress related to emotional,            family, social, employment or financial           situation  _X_ Affect on work and/or disability benefits  _X_ Affect on future life insurance  _X_ Transplant outcome expectations may           not be met  _X_ Mental Health Risks: anxiety,           depression, PTSD, guilt, grief and           chronic fatigue     Notable Items:   Ijeoma indicated his friends will be able to assist him post transplant.  He is currently staying with a friend in [place], [place] on dialysis days.      Final Evaluation/Assessment   Patient seemed to process information well. Appeared well informed, motivated and able to follow post transplant requirements. Behavior was appropriate during interview. Has adequate income and insurance coverage. Adequate social support. No major contraindications noted for transplant.  At this time patient appears to understand the risks and benefits of transplant.      Recommendation  Acceptable    Selection Criteria Met:  Plan for support Yes   Chemical Dependence Yes   Smoking Yes   Mental Health Yes   Adequate Finances Yes    Signature: ANGI Cardona, LICSW   Title: Clinical

## 2022-08-01 NOTE — LETTER
2022         RE: Ijeoma Polk  [address]  [place] [address]        Dear Colleague,    Thank you for referring your patient, Ijeoma Polk, to the [facility]. Please see a copy of my visit note below.    Transplant Surgery Consult Note     Medical record number: [id number]  YOB: 1950,   Consult requested by Dr. Justin for evaluation of kidney transplant candidacy.    Assessment and Recommendations:Mr. Polk appears to be a fair candidate for kidney transplantation and has a fair understanding of the risks and benefits of this approach to the management of renal failure. The following issues should be addressed prior to finalizing his transplant candidacy:     Son is potential donor  Needs Cards eval  Needs CT abd/pelvis   Hx of Gastric Bypass- reports loose stools, takes immodium PRN ( but requires it almost everyday)    The majority of our visit was spent in counselling, discussing the medical and surgical risks of kidney transplantation. We discussed approximate wait time and how that is influenced by issues such as blood type and sensitization (PRA) and access to a living donor. I contrasted potential waiting time for living vs  donor kidneys from  normal (0-85%) or higher (%) kidney donor profile index (KDPI) donors and their associated outcomes. I would not recommend this individual to consider kidneys from high KDPI donors. The reason for this decision is best summarized as: potential living donor. Potential surgical complications of kidney transplantation include bleeding, superficial or deep wound complications (infection, hernia, lymphocele), ureteral anastomotic failure (leak or stenosis), graft thrombosis, need for reoperation and other issues such as cardiac complications, pneumonia, deep venous thrombosis, pulmonary embolism, post transplant diabetes and death. The potential for recurrent disease or need for retransplantation was  also addressed. We discussed the possible need for ureteral stent (and subsequent removal), and the utility of protocol biopsy and laboratory studies to evaluate for rejection or recurrent disease. We discussed the risk of graft rejection, our center's average graft and patient survival rates, immunosuppression protocols, as well as the potential opportunity to participate in clinical trials.  We also discussed the average length of stay, recovery process, and posttransplant lab and monitoring protocol.  I emphasized the need for strict immunosuppression medication adherence and the potential for complications of immunosuppression such as skin cancer or lymphoma, as well as a very low but not zero risk of donor-derived disease transmission risks (infection, cancer). Mr. Polk asked good questions and his candidacy will be reviewed at our Multidisciplinary Selection Committee. Thank you for the opportunity to participate in Mr. Polk's care.      45 minutes spent on the date of the encounter in chart review, patient visit, review of tests, documentation and/or discussion with other providers about the issues documented above.    José Manuel Bocanegra MD [id number]    ---------------------------------------------------------------------------------------------------    HPI: Mr. Polk has End stage renal failure due to membranous nephropathy ([place]). The patient is diabetic.       The patient is on dialysis.    Has potential kidney donors:  Yes . son  Interested in participation in paired exchange if a donor is willing: Doesn't know     The patient has the following pertinent history:       No    Yes  Dialysis:    []      [x] via:    catheter   Blood Transfusion                  [x]      []  Number of units:   Most recently:  Pregnancy:    [x]      [] Number:       Previous Transplant:  [x]      [] Details:    Cancer    [x]      [] Comment:   Kidney stones   []      [x] Comment:      Recurrent infections  [x]      []  Type:                   Bladder dysfunction  [x]      [] Cause:    Claudication   [x]      [] Distance:    Previous Amputation  [x]      [] Cause:     Chronic anticoagulation  []      [x] Indication: coumadin for a fib  Congregation  [x]      []      No past medical history on file.  No past surgical history on file.  Family History   Problem Relation Age of Onset     Emphysema Mother      Alcoholism Father      Social History     Socioeconomic History     Marital status:      Spouse name: Not on file     Number of children: Not on file     Years of education: Not on file     Highest education level: Not on file   Occupational History     Not on file   Tobacco Use     Smoking status: Never Smoker     Smokeless tobacco: Never Used   Substance and Sexual Activity     Alcohol use: Not Currently     Drug use: Never     Sexual activity: Not on file   Other Topics Concern     Not on file   Social History Narrative     Not on file     Social Determinants of Health     Financial Resource Strain: Not on file   Food Insecurity: Not on file   Transportation Needs: Not on file   Physical Activity: Not on file   Stress: Not on file   Social Connections: Not on file   Intimate Partner Violence: Not on file   Housing Stability: Not on file       ROS:   CONSTITUTIONAL:  No fevers or chills  EYES: negative for icterus  ENT:  negative for hearing loss, tinnitus and sore throat  RESPIRATORY:  negative for cough, sputum, dyspnea  CARDIOVASCULAR:  negative for chest pain None  GASTROINTESTINAL:  negative for nausea, vomiting, diarrhea or constipation  GENITOURINARY:  negative for incontinence, dysuria, bladder emptying problems  HEME:  No easy bruising  INTEGUMENT:  negative for rash and pruritus  NEURO:  Negative for headache, seizure disorder  Allergies:   Allergies   Allergen Reactions     Furosemide Hives     Lorazepam Rash     Penicillins Rash     Sulfadiazine Rash     Medications:  Prescription Medications as of 8/1/2022       Rx  Number Disp Refills Start End Last Dispensed Date Next Fill Date Owning Pharmacy    atorvastatin (LIPITOR) 10 MG tablet    6/29/2022        Sig: TAKE 1 TABLET BY MOUTH ONCE DAILY AT BEDTIME    Class: Historical    augmented betamethasone dipropionate (DIPROLENE AF) 0.05 % external cream    1/8/2021        Class: Historical    Route: Topical    B Complex-C-Folic Acid (TATY-BRIT RX) 1 mg TABS    6/6/2022        Sig: Take 1 tablet by mouth daily    Class: Historical    Route: Oral    calcium acetate (PHOSLO) 667 MG CAPS capsule    5/10/2022        Sig: TAKE 1 CAPSULE BY MOUTH THREE TIMES DAILY WITH FOOD . DO NOT TAKE ON AN EMPTY STOMACH. MUST BE TAKEN WITH FOOD TO BE EFFECTIVE    Class: Historical    cholecalciferol 125 MCG (5000 UT) CAPS    6/1/2022        Sig: Take 5,000 Units by mouth    Class: Historical    Route: Oral    cyanocobalamin (VITAMIN B-12) 1000 MCG SUBL sublingual tablet            Sig: Place 1,000 mcg under the tongue    Class: Historical    Route: Sublingual    digoxin (LANOXIN) 125 MCG tablet    11/29/2021        Sig: Take 1 tablet by mouth daily    Class: Historical    Route: Oral    diphenoxylate-atropine (LOMOTIL) 2.5-0.025 MG tablet    4/27/2022        Sig: Take 1 tablet by mouth    Class: Historical    Route: Oral    Lactobacillus Rhamnosus, GG, ( PROBIOTIC DIGESTIVE CARE) CAPS    10/28/2021        Sig: Take 2 capsules by mouth    Class: Historical    Route: Oral    levothyroxine (EUTHYROX) 50 MCG tablet    9/2/2021        Sig: Take 1 tablet by mouth daily    Class: Historical    Route: Oral    loperamide (IMODIUM A-D) 2 MG tablet    8/11/2021        Sig: Take 2 mg by mouth    Class: Historical    Route: Oral    metolazone (ZAROXOLYN) 2.5 MG tablet    5/23/2022        Sig: Take 2.5 mg by mouth    Class: Historical    Route: Oral    metoprolol succinate ER (TOPROL XL) 50 MG 24 hr tablet    3/1/2022        Sig: Take 50 mg by mouth    Class: Historical    Route: Oral    ofloxacin (OCUFLOX) 0.3 %  ophthalmic solution    6/28/2022        Sig: Place 5 drops in right ear once daily for 7 days    Class: Historical    omeprazole (PRILOSEC OTC) 20 MG EC tablet    12/7/2021        Sig: Take 20 mg by mouth    Class: Historical    Route: Oral    potassium chloride ER (K-TAB) 20 MEQ CR tablet    6/24/2022        Sig: TAKE 1 TABLET BY MOUTH TWICE DAILY WITH MEALS . DO NOT CRUSH .    Class: Historical    sucralfate (CARAFATE) 1 GM tablet    1/20/2022        Sig: Take 1 g by mouth    Class: Historical    Route: Oral    torsemide (DEMADEX) 10 MG tablet    4/4/2022        Sig: Take 3 tablets by mouth in the morning and 1 tablet in the evening.    Class: Historical    triamcinolone (KENALOG) 0.025 % external ointment    9/25/2020        Sig: Apply thin layer twice daily for up to two weeks, then on weekends only, as needed, for up to six weeks. The course can be repeated as needed. For use on the head, neck, skin folds, or genitalia.    Class: Historical    Urea 40 % CREA    6/29/2022        Class: Historical    Route: Topical    vitamin D2 (ERGOCALCIFEROL) 07010 units (1250 mcg) capsule    10/28/2021        Sig: TAKE 1 CAPSULE BY MOUTH ON MONDAY WEDNESDAY AND FRIDAY    Class: Historical    warfarin ANTICOAGULANT (COUMADIN) 5 MG tablet    9/8/2021        Sig: Take 1 to 1 1/2 tabs po daily or as directed per [facility]    Class: Historical    Zinc 30 MG TABS    4/6/2021        Sig: Take 1 each by mouth    Class: Historical    Route: Oral        Exam:   Pulse:  [62] 62  BP: (95)/(56) 95/56  SpO2:  [98 %] 98 %  GEN:NAD  HEENT:NCAT, EOMI  RESP: breathing comfortably on room air  CARDS:RRR  ABD: soft, nontender, nondistended, scars consistent with surgical history  MSK:WWP, moving all extremities  NEURO: CN II-XII intact; A/Ox3      Diagnostics:   No results found for this or any previous visit (from the past 672 hour(s)).  No results found for: CPRA        Again, thank you for allowing me to participate in the care of your  patient.        Sincerely,        MIGUEL

## 2022-08-02 ENCOUNTER — DOCUMENTATION ONLY (OUTPATIENT)
Dept: OTHER | Facility: CLINIC | Age: 72
End: 2022-08-02

## 2022-08-02 LAB
C PEPTIDE SERPL-MCNC: 20.3 NG/ML (ref 0.9–6.9)
CARDIOLIPIN IGG SER IA-ACNC: 3.2 GPL-U/ML
CARDIOLIPIN IGG SER IA-ACNC: NEGATIVE
CARDIOLIPIN IGM SER IA-ACNC: <2 MPL-U/ML
CARDIOLIPIN IGM SER IA-ACNC: NEGATIVE
CMV IGG SERPL IA-ACNC: <0.2 U/ML
CMV IGG SERPL IA-ACNC: NORMAL
DRVVT SCREEN RATIO: 1.07
EBV VCA IGG SER IA-ACNC: >750 U/ML
EBV VCA IGG SER IA-ACNC: POSITIVE
HBV CORE AB SERPL QL IA: NONREACTIVE
HBV SURFACE AB SERPL IA-ACNC: 0.6 M[IU]/ML
HBV SURFACE AG SERPL QL IA: NONREACTIVE
HCV AB SERPL QL IA: NONREACTIVE
HIV 1+2 AB+HIV1 P24 AG SERPL QL IA: NONREACTIVE
LA PPP-IMP: NEGATIVE
LUPUS INTERPRETATION: ABNORMAL
PLATELET NEUTRALIZATION: -8 SECONDS
PTT 1:2 MIX: 39 SECONDS (ref 31–45)
PTT RATIO: 1.3
THROMBIN TIME: 18 SECONDS (ref 13–19)
VZV IGG SER QL IA: 2306 INDEX
VZV IGG SER QL IA: POSITIVE

## 2022-08-02 PROCEDURE — 85390 FIBRINOLYSINS SCREEN I&R: CPT | Mod: 26 | Performed by: PATHOLOGY

## 2022-08-03 LAB
GAMMA INTERFERON BACKGROUND BLD IA-ACNC: 0.1 IU/ML
M TB IFN-G BLD-IMP: NEGATIVE
M TB IFN-G CD4+ BCKGRND COR BLD-ACNC: 9.9 IU/ML
MITOGEN IGNF BCKGRD COR BLD-ACNC: -0.02 IU/ML
MITOGEN IGNF BCKGRD COR BLD-ACNC: -0.02 IU/ML
QUANTIFERON MITOGEN: 10 IU/ML
QUANTIFERON NIL TUBE: 0.1 IU/ML
QUANTIFERON TB1 TUBE: 0.08 IU/ML
QUANTIFERON TB2 TUBE: 0.08

## 2022-08-04 PROCEDURE — G0452 MOLECULAR PATHOLOGY INTERPR: HCPCS | Mod: 26 | Performed by: PATHOLOGY

## 2022-08-05 LAB
A*: NORMAL
A*LOCUS SEROLOGIC EQUIVALENT: 29
A*LOCUS: NORMAL
A*SEROLOGIC EQUIVALENT: 68
ABTEST METHOD: NORMAL
B*: NORMAL
B*LOCUS SEROLOGIC EQUIVALENT: 62
B*LOCUS: NORMAL
B*SEROLOGIC EQUIVALENT: 44
BW-1: NORMAL
BW-2: NORMAL
C*: NORMAL
C*LOCUS SEROLOGIC EQUIVALENT: 4
C*LOCUS: NORMAL
C*SEROLOGIC EQUIVALENT: 16
DPA1*: NORMAL
DPA1*LOCUS: NORMAL
DPB1*: NORMAL
DPB1*LOCUS: NORMAL
DQA1*: NORMAL
DQA1*LOCUS: NORMAL
DQB1*: NORMAL
DQB1*LOCUS SEROLOGIC EQUIVALENT: 5
DQB1*LOCUS: NORMAL
DQB1*SEROLOGIC EQUIVALENT: 6
DRB1*: NORMAL
DRB1*LOCUS SEROLOGIC EQUIVALENT: 13
DRB1*LOCUS: NORMAL
DRB1*SEROLOGIC EQUIVALENT: 14
DRB3*: NORMAL
DRB3*LOCUS SEROLOGIC EQUIVALENT: 52
DRB3*LOCUS: NORMAL
DRB3*SEROLOGIC EQUIVALENT: 52
DRSSO TEST METHOD: NORMAL

## 2022-08-06 LAB
PROTOCOL CUTOFF: NORMAL
SA 1 CELL: NORMAL
SA 1 TEST METHOD: NORMAL
SA 2 CELL: NORMAL
SA 2 TEST METHOD: NORMAL
SA1 HI RISK ABY: NORMAL
SA1 MOD RISK ABY: NORMAL
SA2 HI RISK ABY: NORMAL
SA2 MOD RISK ABY: NORMAL
UNACCEPTABLE ANTIGENS: NORMAL
UNOS CPRA: 0
ZZZSA 1  COMMENTS: NORMAL
ZZZSA 2 COMMENTS: NORMAL

## 2022-08-10 ENCOUNTER — TELEPHONE (OUTPATIENT)
Dept: TRANSPLANT | Facility: CLINIC | Age: 72
End: 2022-08-10

## 2022-08-10 ENCOUNTER — COMMITTEE REVIEW (OUTPATIENT)
Dept: TRANSPLANT | Facility: CLINIC | Age: 72
End: 2022-08-10

## 2022-08-10 PROBLEM — N18.6 ESRD (END STAGE RENAL DISEASE) (H): Status: ACTIVE | Noted: 2022-08-10

## 2022-08-10 PROBLEM — E87.6 HYPOKALEMIA: Status: ACTIVE | Noted: 2022-08-10

## 2022-08-10 PROBLEM — I48.0 PAF (PAROXYSMAL ATRIAL FIBRILLATION) (H): Status: ACTIVE | Noted: 2022-08-10

## 2022-08-10 PROBLEM — N02.2 MEMBRANOUS NEPHROPATHY DETERMINED BY BIOPSY: Status: ACTIVE | Noted: 2022-08-10

## 2022-08-10 PROBLEM — E11.9 TYPE 2 DIABETES MELLITUS WITHOUT COMPLICATION, WITHOUT LONG-TERM CURRENT USE OF INSULIN (H): Status: ACTIVE | Noted: 2022-08-10

## 2022-08-10 PROBLEM — R91.8 PULMONARY NODULES: Status: ACTIVE | Noted: 2022-08-10

## 2022-08-10 PROBLEM — Z90.49 HISTORY OF BOWEL RESECTION: Status: ACTIVE | Noted: 2022-08-10

## 2022-08-10 PROBLEM — K52.9 CHRONIC DIARRHEA: Status: ACTIVE | Noted: 2022-08-10

## 2022-08-10 PROBLEM — Z98.84 H/O GASTRIC BYPASS: Status: ACTIVE | Noted: 2022-08-10

## 2022-08-10 PROBLEM — R31.9 HEMATURIA: Status: ACTIVE | Noted: 2022-08-10

## 2022-08-10 NOTE — LETTER
08/12/22        Ijeoma Polk  [address] [address] [address]        Dear Ijeoma,    It was a pleasure to see you recently for consideration of kidney transplantation. Your pre-transplant evaluation results were reviewed at our Multidisciplinary Selection Committee on 8/10/22. The Committee has approved you as a candidate for living donor kidney transplant and is requesting the following items are completed as part of your evaluation:    1. Cardiac risk assessment - we will send orders to Dr. Cohen's office for you to complete this locally    2. Chest/Abdominal/Pelvic CT - we will send orders to Dr. Cohen's office for you to complete this locally    3. Iliac Ultrasound - we will send orders to Dr. Cohen's office for you to complete this locally    4. GI referral for further management of diarrhea/clearance to proceed with kidney transplant - we will send orders to Dr. Cohen's office for you to complete this locally    5. Urology consult for evaluation of Hematuria and urologic clearance to proceed with kidney transplant - we will send orders to Dr. Cohen's office for you to complete this locally    6. Once you are closer to being ready for transplant, we will have you do a trial of Mycophenolate (Cellcept) -to ensure you will tolerate this medication following a transplant      For any questions, please contact the Transplant Office at [phone] or you may reach me directly at [phone].      Sincerely,  Lillie Rogel RN, Pre-Kidney/Pancreas Transplant Coordinator  Solid Organ Transplant  [facility], [facility]    CC: Care Team

## 2022-08-10 NOTE — LETTER
PHYSICIAN ORDERS      DATE & TIME ISSUED: 2022 10:39 AM  PATIENT NAME: Ijeoma Polk   : 1950     [facility] MR# [if applicable]: [id number]     DIAGNOSIS:  Awaiting Organ Transplant, Pre-Operative Cardiovascular Exam  ICD-10 CODE: Z76.82, Z01.810    As part of this pt's pre-kidney transplant evaluation, the transplant team is requesting the following items are completed:  1. Echocardiogram  2. Cardiac risk assessment to include appropriate cardiac testing needed to clear pt for transplant from a cardiac perspective   3. Non-contrast Chest/Abdominal/Pelvic CT scan - to assess pulmonary nodules noted on chest xray 22 and to assess vascular targets for kidney transplant surgery  4. Iliac/aortto duplex to assess vessels/blood flow  5. GI consult for further management of diarrhea (will ultimately need GI clearance to proceed w/ kidney transplant)  6. Urology consult and clearance due to hematuria     Please electronically push imaging to [facility]: [facility]  Any questions please call: CHIRAG Moreira, [phone]  Please fax reports/records to:    [phone].        Dariusz Cruz MD  Surgical Director, Kidney Transplantation

## 2022-08-10 NOTE — COMMITTEE REVIEW
Abdominal Committee Review Note     Evaluation Date: 8/1/2022  Committee Review Date: 8/10/2022    Organ being evaluated for: Kidney    Transplant Phase: Evaluation  Transplant Status: Active    Transplant Coordinator: Lillie Rogel  Transplant Surgeon: Dr. José Manuel Bocanegra    Referring Physician: Juan Justin    Primary Diagnosis:   Secondary Diagnosis:     Committee Review Members:  Apollo, Romain Duque, RD   Nephrology Drew Christopher, APRN CNP, Nikita Rahman MD   Pharmacist Sona Albert, [facility]   Pharmacy Fatimah Akins    - Clinical Pamela Sirena Stephenson, [facility], Martina Adams, [facility]   Transplant Kamila Dimas PA-C, Malgorzata Vilchis, RN, Lillie Rogel, RN, Martina Garcia, RN, Rebecca De Jesus, ALKA, Pat Huggins, CHIRAG, Mindy Carrasco, RN, Ashwini León, RN, Kimberil Guzman, RN   Transplant Surgery Benny Mirza MD       Transplant Eligibility: Irreversible chronic kidney disease treated w/dialysis or expected need for dialysis    Committee Review Decision: Needs Re-presentation    Relative Contraindications: None    Absolute Contraindications: None    Committee Chair Benny Mirza MD verbally attested to the committee's decision.    Committee Discussion Details: Reviewed pt's medical status and evaluation results to date.  Pt is determined to be approved as candidate for living donor kidney transplant only. Dr. Alli Vyas recommends the following items be completed as part of the pt's evaluation: Cardiac risk assessment, Ab/Pelv CT, Iliac US, GI clearance d/t diarrhea, Chest CT as follow up on nodule noted on chest xray, Urology clearance d/t hematuria, and MMF trial.  Will not list the pt as he is approved for LDKT only. Pt will be re-discussed upon successful completion of all evaluation items.

## 2022-08-10 NOTE — TELEPHONE ENCOUNTER
Patient Call: Voicemail  Date/Time: 8/10/2022  1500  Reason for call: pt has appt here last week  Now is back home and got a letter or vm he needs to see a Urologist  Wants to know if he can see someone locally   Is 200 mile trip to come here

## 2022-08-12 NOTE — TELEPHONE ENCOUNTER
Called pt to review the outcome of Selection Committee. Explained that he is approved to proceed w/ LDKT only.  His son and daughter in law are interested in being evaluated to donate. I encouraged him to have them register. We reviewed his next steps, he would like orders sent to his PCP, Dr. Hugo Cohen at [facility], will fax orders:    Cards  Ab/Pelv CT  Iliac US  GI - diarrhea  Chest CT - nodule on xray  Hematuria - urology     We reviewed that as he gets closer to being approved we would like him to do an MMF trial.  He will keep me posted regarding the status of his evaluation items. He had no further questions at this time.

## 2022-08-14 ENCOUNTER — HEALTH MAINTENANCE LETTER (OUTPATIENT)
Age: 72
End: 2022-08-14

## 2022-11-19 ENCOUNTER — HEALTH MAINTENANCE LETTER (OUTPATIENT)
Age: 72
End: 2022-11-19

## 2023-01-01 ENCOUNTER — HEALTH MAINTENANCE LETTER (OUTPATIENT)
Age: 73
End: 2023-01-01

## 2023-04-07 ENCOUNTER — TELEPHONE (OUTPATIENT)
Dept: TRANSPLANT | Facility: CLINIC | Age: 73
End: 2023-04-07
Payer: MEDICARE

## 2023-04-07 NOTE — TELEPHONE ENCOUNTER
Called pt to touch base on evaluation status, left  requesting return call.  Per chart review he has completed most of his work up, he will still need a cardiac risk assesment. I have requested his imaging get pushed from [facility] for review by our team.  He is approved for LD only, I do not see any donors registered at this time. Provided direct line for call back to discuss.

## 2023-04-07 NOTE — TELEPHONE ENCOUNTER
Pt returned my call, we reviewed the status of his evaluation.  He has completed all his testing with the exception of a cardiac risk assessment. I have requested his imaging to get pushed over for our review.   He is approved for LD only and spoke to his months ago, he does not want to pressure his son to donate, and I told him we also would not want that. I let him know that at this time, there are no potential donors registered so we can hold off on his cardiac testing and if/when a donor comes forward we will resume his evaluation. He had no further questions and was in good agreement w/ the plan.

## 2023-04-09 ENCOUNTER — HEALTH MAINTENANCE LETTER (OUTPATIENT)
Age: 73
End: 2023-04-09

## 2023-04-12 ENCOUNTER — TELEPHONE (OUTPATIENT)
Dept: TRANSPLANT | Facility: CLINIC | Age: 73
End: 2023-04-12
Payer: MEDICARE

## 2023-04-14 NOTE — TELEPHONE ENCOUNTER
Pt called to let me know that his son was ruled out as a candidate d/t BMI.  He would like to keep his evaluation open in the even another donor comes forward.  I let him know next step will be having his imaging reviewed.  I will let him know if any follow up is needed after it is reviewed by surgery.  He understands he would need a cardiac risk assessment, and is established w/ a local Cardiologist. I will follow up w/ him next week after imaging review.

## 2024-01-01 ENCOUNTER — APPOINTMENT (OUTPATIENT)
Dept: RADIOLOGY | Facility: CLINIC | Age: 74
DRG: 871 | End: 2024-01-01
Attending: ORTHOPAEDIC SURGERY
Payer: MEDICARE

## 2024-01-01 ENCOUNTER — LAB REQUISITION (OUTPATIENT)
Dept: LAB | Facility: CLINIC | Age: 74
End: 2024-01-01
Payer: MEDICARE

## 2024-01-01 ENCOUNTER — TELEPHONE (OUTPATIENT)
Dept: TRANSPLANT | Facility: CLINIC | Age: 74
End: 2024-01-01
Payer: MEDICARE

## 2024-01-01 ENCOUNTER — LAB REQUISITION (OUTPATIENT)
Dept: LAB | Facility: CLINIC | Age: 74
End: 2024-01-01

## 2024-01-01 ENCOUNTER — COMMITTEE REVIEW (OUTPATIENT)
Dept: TRANSPLANT | Facility: CLINIC | Age: 74
End: 2024-01-01
Payer: MEDICARE

## 2024-01-01 ENCOUNTER — HEALTH MAINTENANCE LETTER (OUTPATIENT)
Age: 74
End: 2024-01-01

## 2024-01-01 ENCOUNTER — TRANSITIONAL CARE UNIT VISIT (OUTPATIENT)
Dept: GERIATRICS | Facility: CLINIC | Age: 74
End: 2024-01-01
Payer: MEDICARE

## 2024-01-01 ENCOUNTER — APPOINTMENT (OUTPATIENT)
Dept: PHYSICAL THERAPY | Facility: CLINIC | Age: 74
DRG: 871 | End: 2024-01-01
Payer: MEDICARE

## 2024-01-01 ENCOUNTER — HOSPITAL ENCOUNTER (INPATIENT)
Facility: CLINIC | Age: 74
LOS: 7 days | DRG: 871 | End: 2024-09-04
Attending: EMERGENCY MEDICINE | Admitting: INTERNAL MEDICINE
Payer: MEDICARE

## 2024-01-01 ENCOUNTER — APPOINTMENT (OUTPATIENT)
Dept: PHYSICAL THERAPY | Facility: CLINIC | Age: 74
DRG: 871 | End: 2024-01-01
Attending: STUDENT IN AN ORGANIZED HEALTH CARE EDUCATION/TRAINING PROGRAM
Payer: MEDICARE

## 2024-01-01 ENCOUNTER — TELEPHONE (OUTPATIENT)
Dept: GERIATRICS | Facility: CLINIC | Age: 74
End: 2024-01-01
Payer: MEDICARE

## 2024-01-01 ENCOUNTER — APPOINTMENT (OUTPATIENT)
Dept: RADIOLOGY | Facility: CLINIC | Age: 74
DRG: 871 | End: 2024-01-01
Attending: EMERGENCY MEDICINE
Payer: MEDICARE

## 2024-01-01 ENCOUNTER — APPOINTMENT (OUTPATIENT)
Dept: CT IMAGING | Facility: CLINIC | Age: 74
DRG: 871 | End: 2024-01-01
Attending: HOSPITALIST
Payer: MEDICARE

## 2024-01-01 ENCOUNTER — TELEPHONE (OUTPATIENT)
Dept: GERIATRICS | Facility: CLINIC | Age: 74
End: 2024-01-01

## 2024-01-01 ENCOUNTER — RESULTS ONLY (OUTPATIENT)
Dept: ADMINISTRATIVE | Facility: CLINIC | Age: 74
End: 2024-01-01

## 2024-01-01 ENCOUNTER — MEDICAL CORRESPONDENCE (OUTPATIENT)
Dept: HEALTH INFORMATION MANAGEMENT | Facility: CLINIC | Age: 74
End: 2024-01-01

## 2024-01-01 ENCOUNTER — APPOINTMENT (OUTPATIENT)
Dept: SPEECH THERAPY | Facility: CLINIC | Age: 74
DRG: 871 | End: 2024-01-01
Payer: MEDICARE

## 2024-01-01 ENCOUNTER — APPOINTMENT (OUTPATIENT)
Dept: CARDIOLOGY | Facility: CLINIC | Age: 74
DRG: 871 | End: 2024-01-01
Attending: INTERNAL MEDICINE
Payer: MEDICARE

## 2024-01-01 ENCOUNTER — DOCUMENTATION ONLY (OUTPATIENT)
Dept: GERIATRICS | Facility: CLINIC | Age: 74
End: 2024-01-01

## 2024-01-01 ENCOUNTER — APPOINTMENT (OUTPATIENT)
Dept: SPEECH THERAPY | Facility: CLINIC | Age: 74
DRG: 871 | End: 2024-01-01
Attending: HOSPITALIST
Payer: MEDICARE

## 2024-01-01 ENCOUNTER — APPOINTMENT (OUTPATIENT)
Dept: CT IMAGING | Facility: CLINIC | Age: 74
DRG: 871 | End: 2024-01-01
Attending: EMERGENCY MEDICINE
Payer: MEDICARE

## 2024-01-01 ENCOUNTER — APPOINTMENT (OUTPATIENT)
Dept: RADIOLOGY | Facility: CLINIC | Age: 74
DRG: 871 | End: 2024-01-01
Attending: ANESTHESIOLOGY
Payer: MEDICARE

## 2024-01-01 ENCOUNTER — APPOINTMENT (OUTPATIENT)
Dept: RADIOLOGY | Facility: CLINIC | Age: 74
DRG: 871 | End: 2024-01-01
Attending: INTERNAL MEDICINE
Payer: MEDICARE

## 2024-01-01 ENCOUNTER — HOSPITAL ENCOUNTER (EMERGENCY)
Facility: CLINIC | Age: 74
Discharge: HOME | End: 2024-08-21
Attending: FAMILY MEDICINE | Admitting: FAMILY MEDICINE
Payer: MEDICARE

## 2024-01-01 VITALS
BODY MASS INDEX: 23.98 KG/M2 | DIASTOLIC BLOOD PRESSURE: 56 MMHG | HEIGHT: 72 IN | RESPIRATION RATE: 18 BRPM | TEMPERATURE: 97.1 F | HEART RATE: 85 BPM | OXYGEN SATURATION: 96 % | SYSTOLIC BLOOD PRESSURE: 105 MMHG | WEIGHT: 177 LBS

## 2024-01-01 VITALS
HEART RATE: 84 BPM | TEMPERATURE: 97 F | RESPIRATION RATE: 16 BRPM | DIASTOLIC BLOOD PRESSURE: 64 MMHG | SYSTOLIC BLOOD PRESSURE: 126 MMHG | OXYGEN SATURATION: 94 % | HEIGHT: 70 IN | WEIGHT: 177 LBS | BODY MASS INDEX: 25.34 KG/M2

## 2024-01-01 VITALS
BODY MASS INDEX: 25.86 KG/M2 | RESPIRATION RATE: 20 BRPM | HEART RATE: 104 BPM | SYSTOLIC BLOOD PRESSURE: 88 MMHG | WEIGHT: 190.7 LBS | OXYGEN SATURATION: 95 % | TEMPERATURE: 97.6 F | DIASTOLIC BLOOD PRESSURE: 55 MMHG

## 2024-01-01 VITALS
HEIGHT: 72 IN | TEMPERATURE: 97.7 F | SYSTOLIC BLOOD PRESSURE: 101 MMHG | OXYGEN SATURATION: 95 % | RESPIRATION RATE: 16 BRPM | HEART RATE: 78 BPM | WEIGHT: 168 LBS | BODY MASS INDEX: 22.75 KG/M2 | DIASTOLIC BLOOD PRESSURE: 61 MMHG

## 2024-01-01 VITALS
OXYGEN SATURATION: 97 % | TEMPERATURE: 98.9 F | BODY MASS INDEX: 23.98 KG/M2 | HEART RATE: 85 BPM | SYSTOLIC BLOOD PRESSURE: 153 MMHG | DIASTOLIC BLOOD PRESSURE: 75 MMHG | HEIGHT: 72 IN | WEIGHT: 177 LBS | RESPIRATION RATE: 20 BRPM

## 2024-01-01 DIAGNOSIS — I83.019: ICD-10-CM

## 2024-01-01 DIAGNOSIS — Z51.81 ENCOUNTER FOR THERAPEUTIC DRUG LEVEL MONITORING: ICD-10-CM

## 2024-01-01 DIAGNOSIS — N18.6 ANEMIA IN CHRONIC KIDNEY DISEASE, ON CHRONIC DIALYSIS (H): ICD-10-CM

## 2024-01-01 DIAGNOSIS — S42.492D CLOSED BICONDYLAR FRACTURE OF DISTAL END OF LEFT HUMERUS WITH ROUTINE HEALING, SUBSEQUENT ENCOUNTER: ICD-10-CM

## 2024-01-01 DIAGNOSIS — R19.7 DIARRHEA: Primary | ICD-10-CM

## 2024-01-01 DIAGNOSIS — T82.838A HEMORRHAGE FROM DIALYSIS CATHETER (H): ICD-10-CM

## 2024-01-01 DIAGNOSIS — S42.492D CLOSED BICONDYLAR FRACTURE OF DISTAL END OF LEFT HUMERUS WITH ROUTINE HEALING, SUBSEQUENT ENCOUNTER: Primary | ICD-10-CM

## 2024-01-01 DIAGNOSIS — Z99.2 ANEMIA IN CHRONIC KIDNEY DISEASE, ON CHRONIC DIALYSIS (H): ICD-10-CM

## 2024-01-01 DIAGNOSIS — I48.21 PERMANENT ATRIAL FIBRILLATION (H): Primary | ICD-10-CM

## 2024-01-01 DIAGNOSIS — N18.6 ESRD ON HEMODIALYSIS (H): ICD-10-CM

## 2024-01-01 DIAGNOSIS — Z99.2 ESRD ON HEMODIALYSIS (H): ICD-10-CM

## 2024-01-01 DIAGNOSIS — R53.81 PHYSICAL DECONDITIONING: Primary | ICD-10-CM

## 2024-01-01 DIAGNOSIS — E11.9 TYPE 2 DIABETES MELLITUS WITHOUT COMPLICATION, WITHOUT LONG-TERM CURRENT USE OF INSULIN (H): ICD-10-CM

## 2024-01-01 DIAGNOSIS — E87.6 HYPOKALEMIA: ICD-10-CM

## 2024-01-01 DIAGNOSIS — R52 PAIN MANAGEMENT: ICD-10-CM

## 2024-01-01 DIAGNOSIS — S42.402A CLOSED FRACTURE OF DISTAL END OF LEFT HUMERUS, INITIAL ENCOUNTER: ICD-10-CM

## 2024-01-01 DIAGNOSIS — I48.91 ATRIAL FIBRILLATION WITH RAPID VENTRICULAR RESPONSE (H): ICD-10-CM

## 2024-01-01 DIAGNOSIS — E16.2 HYPOGLYCEMIA: ICD-10-CM

## 2024-01-01 DIAGNOSIS — Z99.2 DIALYSIS PATIENT (H): ICD-10-CM

## 2024-01-01 DIAGNOSIS — L89.153 PRESSURE INJURY OF COCCYGEAL REGION, STAGE 3 (H): ICD-10-CM

## 2024-01-01 DIAGNOSIS — L97.919: ICD-10-CM

## 2024-01-01 DIAGNOSIS — L89.100 PRESSURE INJURY OF BACK, UNSTAGEABLE (H): ICD-10-CM

## 2024-01-01 DIAGNOSIS — I48.91 UNSPECIFIED ATRIAL FIBRILLATION (H): ICD-10-CM

## 2024-01-01 DIAGNOSIS — D63.1 ANEMIA IN CHRONIC KIDNEY DISEASE, ON CHRONIC DIALYSIS (H): ICD-10-CM

## 2024-01-01 DIAGNOSIS — E87.20 LACTIC ACIDOSIS: ICD-10-CM

## 2024-01-01 DIAGNOSIS — R79.1 SUPRATHERAPEUTIC INR: ICD-10-CM

## 2024-01-01 DIAGNOSIS — I95.3 HEMODIALYSIS-ASSOCIATED HYPOTENSION: Primary | ICD-10-CM

## 2024-01-01 DIAGNOSIS — Z79.01 ENCOUNTER FOR CURRENT LONG-TERM USE OF ANTICOAGULANTS: ICD-10-CM

## 2024-01-01 DIAGNOSIS — I95.9 TRANSIENT HYPOTENSION: ICD-10-CM

## 2024-01-01 DIAGNOSIS — N18.6 ESRD (END STAGE RENAL DISEASE) (H): ICD-10-CM

## 2024-01-01 LAB
% LINING CELLS, BODY FLUID: 4 %
ABO/RH(D): NORMAL
ALBUMIN SERPL BCG-MCNC: 2.5 G/DL (ref 3.5–5.2)
ALBUMIN SERPL BCG-MCNC: 2.7 G/DL (ref 3.5–5.2)
ALBUMIN SERPL BCG-MCNC: 2.8 G/DL (ref 3.5–5.2)
ALBUMIN SERPL BCG-MCNC: 3 G/DL (ref 3.5–5.2)
ALBUMIN UR-MCNC: 100 MG/DL
ALP SERPL-CCNC: 104 U/L (ref 40–150)
ALP SERPL-CCNC: 147 U/L (ref 40–150)
ALP SERPL-CCNC: 158 U/L (ref 40–150)
ALT SERPL W P-5'-P-CCNC: 11 U/L (ref 0–70)
ALT SERPL W P-5'-P-CCNC: 14 U/L (ref 0–70)
ALT SERPL W P-5'-P-CCNC: 21 U/L (ref 0–70)
ANION GAP SERPL CALCULATED.3IONS-SCNC: 10 MMOL/L (ref 7–15)
ANION GAP SERPL CALCULATED.3IONS-SCNC: 11 MMOL/L (ref 7–15)
ANION GAP SERPL CALCULATED.3IONS-SCNC: 12 MMOL/L (ref 7–15)
ANION GAP SERPL CALCULATED.3IONS-SCNC: 12 MMOL/L (ref 7–15)
ANION GAP SERPL CALCULATED.3IONS-SCNC: 14 MMOL/L (ref 7–15)
ANION GAP SERPL CALCULATED.3IONS-SCNC: 16 MMOL/L (ref 7–15)
ANION GAP SERPL CALCULATED.3IONS-SCNC: 16 MMOL/L (ref 7–15)
ANION GAP SERPL CALCULATED.3IONS-SCNC: 17 MMOL/L (ref 7–15)
ANION GAP SERPL CALCULATED.3IONS-SCNC: 18 MMOL/L (ref 7–15)
ANION GAP SERPL CALCULATED.3IONS-SCNC: 19 MMOL/L (ref 7–15)
ANION GAP SERPL CALCULATED.3IONS-SCNC: 21 MMOL/L (ref 7–15)
ANION GAP SERPL CALCULATED.3IONS-SCNC: 22 MMOL/L (ref 7–15)
ANION GAP SERPL CALCULATED.3IONS-SCNC: 9 MMOL/L (ref 7–15)
ANTIBODY SCREEN: NEGATIVE
APPEARANCE FLD: ABNORMAL
APPEARANCE UR: CLEAR
APTT PPP: 30 SECONDS (ref 22–38)
AST SERPL W P-5'-P-CCNC: 20 U/L (ref 0–45)
AST SERPL W P-5'-P-CCNC: 21 U/L (ref 0–45)
AST SERPL W P-5'-P-CCNC: ABNORMAL U/L
ATRIAL RATE - MUSE: 159 BPM
ATRIAL RATE - MUSE: 182 BPM
ATRIAL RATE - MUSE: 182 BPM
ATRIAL RATE - MUSE: 192 BPM
BACTERIA #/AREA URNS HPF: ABNORMAL /HPF
BACTERIA BLD CULT: NO GROWTH
BACTERIA BLD CULT: NO GROWTH
BACTERIA PLR CULT: NO GROWTH
BACTERIA SPT CULT: NORMAL
BASE EXCESS BLDV CALC-SCNC: -0.7 MMOL/L (ref -3–3)
BASE EXCESS BLDV CALC-SCNC: 3.7 MMOL/L (ref -3–3)
BASE EXCESS BLDV CALC-SCNC: 5.1 MMOL/L (ref -3–3)
BASOPHILS # BLD AUTO: 0 10E3/UL (ref 0–0.2)
BASOPHILS # BLD AUTO: 0 10E3/UL (ref 0–0.2)
BASOPHILS NFR BLD AUTO: 0 %
BASOPHILS NFR BLD AUTO: 0 %
BILIRUB DIRECT SERPL-MCNC: <0.2 MG/DL (ref 0–0.3)
BILIRUB SERPL-MCNC: 0.3 MG/DL
BILIRUB SERPL-MCNC: 0.4 MG/DL
BILIRUB SERPL-MCNC: 0.6 MG/DL
BILIRUB UR QL STRIP: ABNORMAL
BLD PROD TYP BPU: NORMAL
BLOOD COMPONENT TYPE: NORMAL
BUN SERPL-MCNC: 20.2 MG/DL (ref 8–23)
BUN SERPL-MCNC: 24.4 MG/DL (ref 8–23)
BUN SERPL-MCNC: 25.1 MG/DL (ref 8–23)
BUN SERPL-MCNC: 28.9 MG/DL (ref 8–23)
BUN SERPL-MCNC: 34.7 MG/DL (ref 8–23)
BUN SERPL-MCNC: 35.5 MG/DL (ref 8–23)
BUN SERPL-MCNC: 36 MG/DL (ref 8–23)
BUN SERPL-MCNC: 36.5 MG/DL (ref 8–23)
BUN SERPL-MCNC: 36.5 MG/DL (ref 8–23)
BUN SERPL-MCNC: 38 MG/DL (ref 8–23)
BUN SERPL-MCNC: 38.9 MG/DL (ref 8–23)
BUN SERPL-MCNC: 41.1 MG/DL (ref 8–23)
BUN SERPL-MCNC: 51.9 MG/DL (ref 8–23)
CA-I BLD-MCNC: 4.1 MG/DL (ref 4.4–5.2)
CALCIUM SERPL-MCNC: 6.7 MG/DL (ref 8.8–10.4)
CALCIUM SERPL-MCNC: 7.3 MG/DL (ref 8.8–10.4)
CALCIUM SERPL-MCNC: 7.4 MG/DL (ref 8.8–10.4)
CALCIUM SERPL-MCNC: 7.4 MG/DL (ref 8.8–10.4)
CALCIUM SERPL-MCNC: 7.5 MG/DL (ref 8.8–10.4)
CALCIUM SERPL-MCNC: 7.5 MG/DL (ref 8.8–10.4)
CALCIUM SERPL-MCNC: 7.6 MG/DL (ref 8.8–10.4)
CALCIUM SERPL-MCNC: 7.7 MG/DL (ref 8.8–10.4)
CALCIUM SERPL-MCNC: 7.8 MG/DL (ref 8.8–10.4)
CALCIUM SERPL-MCNC: 7.9 MG/DL (ref 8.8–10.4)
CALCIUM SERPL-MCNC: 7.9 MG/DL (ref 8.8–10.4)
CALCIUM SERPL-MCNC: 8 MG/DL (ref 8.8–10.4)
CALCIUM SERPL-MCNC: 8.1 MG/DL (ref 8.8–10.4)
CELL COUNT BODY FLUID SOURCE: ABNORMAL
CHLORIDE SERPL-SCNC: 90 MMOL/L (ref 98–107)
CHLORIDE SERPL-SCNC: 90 MMOL/L (ref 98–107)
CHLORIDE SERPL-SCNC: 92 MMOL/L (ref 98–107)
CHLORIDE SERPL-SCNC: 93 MMOL/L (ref 98–107)
CHLORIDE SERPL-SCNC: 93 MMOL/L (ref 98–107)
CHLORIDE SERPL-SCNC: 94 MMOL/L (ref 98–107)
CHLORIDE SERPL-SCNC: 94 MMOL/L (ref 98–107)
CHLORIDE SERPL-SCNC: 95 MMOL/L (ref 98–107)
CHLORIDE SERPL-SCNC: 98 MMOL/L (ref 98–107)
CHLORIDE SERPL-SCNC: 99 MMOL/L (ref 98–107)
CODING SYSTEM: NORMAL
COLOR FLD: ABNORMAL
COLOR UR AUTO: YELLOW
CORTIS SERPL-MCNC: 30.8 UG/DL
CORTIS SERPL-MCNC: 58.1 UG/DL
CREAT SERPL-MCNC: 2.43 MG/DL (ref 0.67–1.17)
CREAT SERPL-MCNC: 2.44 MG/DL (ref 0.67–1.17)
CREAT SERPL-MCNC: 2.77 MG/DL (ref 0.67–1.17)
CREAT SERPL-MCNC: 2.95 MG/DL (ref 0.67–1.17)
CREAT SERPL-MCNC: 2.98 MG/DL (ref 0.67–1.17)
CREAT SERPL-MCNC: 3.3 MG/DL (ref 0.67–1.17)
CREAT SERPL-MCNC: 3.5 MG/DL (ref 0.67–1.17)
CREAT SERPL-MCNC: 3.51 MG/DL (ref 0.67–1.17)
CREAT SERPL-MCNC: 3.83 MG/DL (ref 0.67–1.17)
CREAT SERPL-MCNC: 3.86 MG/DL (ref 0.67–1.17)
CREAT SERPL-MCNC: 3.92 MG/DL (ref 0.67–1.17)
CREAT SERPL-MCNC: 3.96 MG/DL (ref 0.67–1.17)
CREAT SERPL-MCNC: 3.99 MG/DL (ref 0.67–1.17)
CROSSMATCH: NORMAL
CRP SERPL-MCNC: 26.1 MG/L
CRP SERPL-MCNC: 44.6 MG/L
DIASTOLIC BLOOD PRESSURE - MUSE: 52 MMHG
DIASTOLIC BLOOD PRESSURE - MUSE: 59 MMHG
DIASTOLIC BLOOD PRESSURE - MUSE: NORMAL MMHG
DIASTOLIC BLOOD PRESSURE - MUSE: NORMAL MMHG
DIGOXIN SERPL-MCNC: 0.4 NG/ML (ref 0.6–2)
EGFRCR SERPLBLD CKD-EPI 2021: 15 ML/MIN/1.73M2
EGFRCR SERPLBLD CKD-EPI 2021: 16 ML/MIN/1.73M2
EGFRCR SERPLBLD CKD-EPI 2021: 16 ML/MIN/1.73M2
EGFRCR SERPLBLD CKD-EPI 2021: 18 ML/MIN/1.73M2
EGFRCR SERPLBLD CKD-EPI 2021: 18 ML/MIN/1.73M2
EGFRCR SERPLBLD CKD-EPI 2021: 19 ML/MIN/1.73M2
EGFRCR SERPLBLD CKD-EPI 2021: 21 ML/MIN/1.73M2
EGFRCR SERPLBLD CKD-EPI 2021: 22 ML/MIN/1.73M2
EGFRCR SERPLBLD CKD-EPI 2021: 23 ML/MIN/1.73M2
EGFRCR SERPLBLD CKD-EPI 2021: 27 ML/MIN/1.73M2
EGFRCR SERPLBLD CKD-EPI 2021: 27 ML/MIN/1.73M2
EOSINOPHIL # BLD AUTO: 0 10E3/UL (ref 0–0.7)
EOSINOPHIL # BLD AUTO: 0 10E3/UL (ref 0–0.7)
EOSINOPHIL NFR BLD AUTO: 0 %
EOSINOPHIL NFR BLD AUTO: 0 %
ERYTHROCYTE [DISTWIDTH] IN BLOOD BY AUTOMATED COUNT: 16.8 % (ref 10–15)
ERYTHROCYTE [DISTWIDTH] IN BLOOD BY AUTOMATED COUNT: 16.8 % (ref 10–15)
ERYTHROCYTE [DISTWIDTH] IN BLOOD BY AUTOMATED COUNT: 17.3 % (ref 10–15)
ERYTHROCYTE [DISTWIDTH] IN BLOOD BY AUTOMATED COUNT: 18.4 % (ref 10–15)
ERYTHROCYTE [DISTWIDTH] IN BLOOD BY AUTOMATED COUNT: 18.6 % (ref 10–15)
ERYTHROCYTE [DISTWIDTH] IN BLOOD BY AUTOMATED COUNT: 19 % (ref 10–15)
ERYTHROCYTE [DISTWIDTH] IN BLOOD BY AUTOMATED COUNT: 19 % (ref 10–15)
ERYTHROCYTE [DISTWIDTH] IN BLOOD BY AUTOMATED COUNT: 19.1 % (ref 10–15)
ERYTHROCYTE [DISTWIDTH] IN BLOOD BY AUTOMATED COUNT: 19.2 % (ref 10–15)
ERYTHROCYTE [DISTWIDTH] IN BLOOD BY AUTOMATED COUNT: 19.3 % (ref 10–15)
GLUCOSE BLDC GLUCOMTR-MCNC: 102 MG/DL (ref 70–99)
GLUCOSE BLDC GLUCOMTR-MCNC: 105 MG/DL (ref 70–99)
GLUCOSE BLDC GLUCOMTR-MCNC: 116 MG/DL (ref 70–99)
GLUCOSE BLDC GLUCOMTR-MCNC: 119 MG/DL (ref 70–99)
GLUCOSE BLDC GLUCOMTR-MCNC: 119 MG/DL (ref 70–99)
GLUCOSE BLDC GLUCOMTR-MCNC: 122 MG/DL (ref 70–99)
GLUCOSE BLDC GLUCOMTR-MCNC: 123 MG/DL (ref 70–99)
GLUCOSE BLDC GLUCOMTR-MCNC: 129 MG/DL (ref 70–99)
GLUCOSE BLDC GLUCOMTR-MCNC: 133 MG/DL (ref 70–99)
GLUCOSE BLDC GLUCOMTR-MCNC: 141 MG/DL (ref 70–99)
GLUCOSE BLDC GLUCOMTR-MCNC: 152 MG/DL (ref 70–99)
GLUCOSE BLDC GLUCOMTR-MCNC: 154 MG/DL (ref 70–99)
GLUCOSE BLDC GLUCOMTR-MCNC: 154 MG/DL (ref 70–99)
GLUCOSE BLDC GLUCOMTR-MCNC: 157 MG/DL (ref 70–99)
GLUCOSE BLDC GLUCOMTR-MCNC: 160 MG/DL (ref 70–99)
GLUCOSE BLDC GLUCOMTR-MCNC: 163 MG/DL (ref 70–99)
GLUCOSE BLDC GLUCOMTR-MCNC: 169 MG/DL (ref 70–99)
GLUCOSE BLDC GLUCOMTR-MCNC: 170 MG/DL (ref 70–99)
GLUCOSE BLDC GLUCOMTR-MCNC: 205 MG/DL (ref 70–99)
GLUCOSE BLDC GLUCOMTR-MCNC: 37 MG/DL (ref 70–99)
GLUCOSE BLDC GLUCOMTR-MCNC: 44 MG/DL (ref 70–99)
GLUCOSE BLDC GLUCOMTR-MCNC: 48 MG/DL (ref 70–99)
GLUCOSE BLDC GLUCOMTR-MCNC: 66 MG/DL (ref 70–99)
GLUCOSE BLDC GLUCOMTR-MCNC: 74 MG/DL (ref 70–99)
GLUCOSE BLDC GLUCOMTR-MCNC: 79 MG/DL (ref 70–99)
GLUCOSE BLDC GLUCOMTR-MCNC: 85 MG/DL (ref 70–99)
GLUCOSE BLDC GLUCOMTR-MCNC: 88 MG/DL (ref 70–99)
GLUCOSE BLDC GLUCOMTR-MCNC: 90 MG/DL (ref 70–99)
GLUCOSE BLDC GLUCOMTR-MCNC: 95 MG/DL (ref 70–99)
GLUCOSE BLDC GLUCOMTR-MCNC: 95 MG/DL (ref 70–99)
GLUCOSE BODY FLUID SOURCE: NORMAL
GLUCOSE FLD-MCNC: 182 MG/DL
GLUCOSE SERPL-MCNC: 105 MG/DL (ref 70–99)
GLUCOSE SERPL-MCNC: 121 MG/DL (ref 70–99)
GLUCOSE SERPL-MCNC: 126 MG/DL (ref 70–99)
GLUCOSE SERPL-MCNC: 127 MG/DL (ref 70–99)
GLUCOSE SERPL-MCNC: 174 MG/DL (ref 70–99)
GLUCOSE SERPL-MCNC: 183 MG/DL (ref 70–99)
GLUCOSE SERPL-MCNC: 189 MG/DL (ref 70–99)
GLUCOSE SERPL-MCNC: 59 MG/DL (ref 70–99)
GLUCOSE SERPL-MCNC: 64 MG/DL (ref 70–99)
GLUCOSE SERPL-MCNC: 72 MG/DL (ref 70–99)
GLUCOSE SERPL-MCNC: 82 MG/DL (ref 70–99)
GLUCOSE SERPL-MCNC: 85 MG/DL (ref 70–99)
GLUCOSE SERPL-MCNC: 86 MG/DL (ref 70–99)
GLUCOSE SERPL-MCNC: 87 MG/DL (ref 70–99)
GLUCOSE SERPL-MCNC: 87 MG/DL (ref 70–99)
GLUCOSE SERPL-MCNC: 98 MG/DL (ref 70–99)
GLUCOSE SERPL-MCNC: 99 MG/DL (ref 70–99)
GLUCOSE UR STRIP-MCNC: NEGATIVE MG/DL
GRAM STAIN RESULT: NORMAL
HBV SURFACE AB SERPL IA-ACNC: <3.5 M[IU]/ML
HBV SURFACE AB SERPL IA-ACNC: NONREACTIVE M[IU]/ML
HBV SURFACE AG SERPL QL IA: NONREACTIVE
HCO3 BLDV-SCNC: 26 MMOL/L (ref 21–28)
HCO3 BLDV-SCNC: 29 MMOL/L (ref 21–28)
HCO3 BLDV-SCNC: 30 MMOL/L (ref 21–28)
HCO3 SERPL-SCNC: 19 MMOL/L (ref 22–29)
HCO3 SERPL-SCNC: 21 MMOL/L (ref 22–29)
HCO3 SERPL-SCNC: 22 MMOL/L (ref 22–29)
HCO3 SERPL-SCNC: 22 MMOL/L (ref 22–29)
HCO3 SERPL-SCNC: 23 MMOL/L (ref 22–29)
HCO3 SERPL-SCNC: 25 MMOL/L (ref 22–29)
HCO3 SERPL-SCNC: 25 MMOL/L (ref 22–29)
HCO3 SERPL-SCNC: 26 MMOL/L (ref 22–29)
HCO3 SERPL-SCNC: 26 MMOL/L (ref 22–29)
HCO3 SERPL-SCNC: 30 MMOL/L (ref 22–29)
HCO3 SERPL-SCNC: 31 MMOL/L (ref 22–29)
HCT VFR BLD AUTO: 20.8 % (ref 40–53)
HCT VFR BLD AUTO: 21.5 % (ref 40–53)
HCT VFR BLD AUTO: 22.9 % (ref 40–53)
HCT VFR BLD AUTO: 23.8 % (ref 40–53)
HCT VFR BLD AUTO: 24.1 % (ref 40–53)
HCT VFR BLD AUTO: 24.9 % (ref 40–53)
HCT VFR BLD AUTO: 25.2 % (ref 40–53)
HCT VFR BLD AUTO: 26 % (ref 40–53)
HCT VFR BLD AUTO: 26.3 % (ref 40–53)
HCT VFR BLD AUTO: 29 % (ref 40–53)
HCT VFR BLD AUTO: 31.7 % (ref 40–53)
HCT VFR BLD AUTO: 32.7 % (ref 40–53)
HGB BLD-MCNC: 10.7 G/DL (ref 13.3–17.7)
HGB BLD-MCNC: 11.2 G/DL (ref 13.3–17.7)
HGB BLD-MCNC: 7 G/DL (ref 13.3–17.7)
HGB BLD-MCNC: 7.2 G/DL (ref 13.3–17.7)
HGB BLD-MCNC: 7.8 G/DL (ref 13.3–17.7)
HGB BLD-MCNC: 8 G/DL (ref 13.3–17.7)
HGB BLD-MCNC: 8 G/DL (ref 13.3–17.7)
HGB BLD-MCNC: 8.1 G/DL (ref 13.3–17.7)
HGB BLD-MCNC: 8.1 G/DL (ref 13.3–17.7)
HGB BLD-MCNC: 8.3 G/DL (ref 13.3–17.7)
HGB BLD-MCNC: 8.6 G/DL (ref 13.3–17.7)
HGB BLD-MCNC: 8.6 G/DL (ref 13.3–17.7)
HGB BLD-MCNC: 8.8 G/DL (ref 13.3–17.7)
HGB BLD-MCNC: 8.9 G/DL (ref 13.3–17.7)
HGB BLD-MCNC: 9.5 G/DL (ref 13.3–17.7)
HGB UR QL STRIP: NEGATIVE
HOLD SPECIMEN: NORMAL
IMM GRANULOCYTES # BLD: 0.1 10E3/UL
IMM GRANULOCYTES # BLD: 0.1 10E3/UL
IMM GRANULOCYTES NFR BLD: 1 %
IMM GRANULOCYTES NFR BLD: 1 %
INR PPP: 1.18 (ref 0.85–1.15)
INR PPP: 1.28 (ref 0.85–1.15)
INR PPP: 1.29 (ref 0.85–1.15)
INR PPP: 1.41 (ref 0.85–1.15)
INR PPP: 1.66 (ref 0.85–1.15)
INR PPP: 2.19 (ref 0.85–1.15)
INR PPP: 2.4 (ref 0.85–1.15)
INR PPP: 2.44 (ref 0.85–1.15)
INR PPP: 2.88 (ref 0.85–1.15)
INR PPP: 2.99 (ref 0.85–1.15)
INR PPP: 3.31 (ref 0.85–1.15)
INR PPP: 4.58 (ref 0.85–1.15)
INR PPP: 4.85 (ref 0.85–1.15)
INR PPP: 5.12 (ref 0.85–1.15)
INR PPP: 5.36 (ref 0.85–1.15)
INR PPP: 5.81 (ref 0.85–1.15)
INR PPP: 5.93 (ref 0.85–1.15)
INR PPP: 5.98 (ref 0.85–1.15)
INR PPP: 6.41 (ref 0.85–1.15)
INTERPRETATION ECG - MUSE: NORMAL
ISSUE DATE AND TIME: NORMAL
KETONES UR STRIP-MCNC: NEGATIVE MG/DL
LACTATE SERPL-SCNC: 1 MMOL/L (ref 0.7–2)
LACTATE SERPL-SCNC: 1.3 MMOL/L (ref 0.7–2)
LACTATE SERPL-SCNC: 4.7 MMOL/L (ref 0.7–2)
LD BODY BODY FLUID SOURCE: NORMAL
LDH FLD L TO P-CCNC: 811 U/L
LDH SERPL L TO P-CCNC: 224 U/L (ref 0–250)
LEUKOCYTE ESTERASE UR QL STRIP: NEGATIVE
LVEF ECHO: NORMAL
LYMPHOCYTES # BLD AUTO: 0.5 10E3/UL (ref 0.8–5.3)
LYMPHOCYTES # BLD AUTO: 0.6 10E3/UL (ref 0.8–5.3)
LYMPHOCYTES NFR BLD AUTO: 4 %
LYMPHOCYTES NFR BLD AUTO: 6 %
LYMPHOCYTES NFR FLD MANUAL: 5 %
MAGNESIUM SERPL-MCNC: 1.7 MG/DL (ref 1.7–2.3)
MAGNESIUM SERPL-MCNC: 1.8 MG/DL (ref 1.7–2.3)
MAGNESIUM SERPL-MCNC: 1.9 MG/DL (ref 1.7–2.3)
MAGNESIUM SERPL-MCNC: 2 MG/DL (ref 1.7–2.3)
MAGNESIUM SERPL-MCNC: 2.1 MG/DL (ref 1.7–2.3)
MAGNESIUM SERPL-MCNC: 2.2 MG/DL (ref 1.7–2.3)
MAGNESIUM SERPL-MCNC: 2.3 MG/DL (ref 1.7–2.3)
MCH RBC QN AUTO: 32.4 PG (ref 26.5–33)
MCH RBC QN AUTO: 32.6 PG (ref 26.5–33)
MCH RBC QN AUTO: 32.7 PG (ref 26.5–33)
MCH RBC QN AUTO: 32.9 PG (ref 26.5–33)
MCH RBC QN AUTO: 33 PG (ref 26.5–33)
MCH RBC QN AUTO: 33.3 PG (ref 26.5–33)
MCHC RBC AUTO-ENTMCNC: 32.1 G/DL (ref 31.5–36.5)
MCHC RBC AUTO-ENTMCNC: 32.4 G/DL (ref 31.5–36.5)
MCHC RBC AUTO-ENTMCNC: 32.8 G/DL (ref 31.5–36.5)
MCHC RBC AUTO-ENTMCNC: 33.5 G/DL (ref 31.5–36.5)
MCHC RBC AUTO-ENTMCNC: 33.6 G/DL (ref 31.5–36.5)
MCHC RBC AUTO-ENTMCNC: 33.7 G/DL (ref 31.5–36.5)
MCHC RBC AUTO-ENTMCNC: 33.8 G/DL (ref 31.5–36.5)
MCHC RBC AUTO-ENTMCNC: 34.1 G/DL (ref 31.5–36.5)
MCHC RBC AUTO-ENTMCNC: 34.1 G/DL (ref 31.5–36.5)
MCHC RBC AUTO-ENTMCNC: 34.3 G/DL (ref 31.5–36.5)
MCV RBC AUTO: 100 FL (ref 78–100)
MCV RBC AUTO: 102 FL (ref 78–100)
MCV RBC AUTO: 102 FL (ref 78–100)
MCV RBC AUTO: 96 FL (ref 78–100)
MCV RBC AUTO: 97 FL (ref 78–100)
MCV RBC AUTO: 98 FL (ref 78–100)
MONOCYTES # BLD AUTO: 0.5 10E3/UL (ref 0–1.3)
MONOCYTES # BLD AUTO: 0.5 10E3/UL (ref 0–1.3)
MONOCYTES NFR BLD AUTO: 4 %
MONOCYTES NFR BLD AUTO: 5 %
MONOS+MACROS NFR FLD MANUAL: 1 %
MRSA DNA SPEC QL NAA+PROBE: NEGATIVE
MUCOUS THREADS #/AREA URNS LPF: PRESENT /LPF
NEUTROPHILS # BLD AUTO: 11.6 10E3/UL (ref 1.6–8.3)
NEUTROPHILS # BLD AUTO: 9 10E3/UL (ref 1.6–8.3)
NEUTROPHILS NFR BLD AUTO: 88 %
NEUTROPHILS NFR BLD AUTO: 92 %
NEUTS BAND NFR FLD MANUAL: 88 %
NITRATE UR QL: NEGATIVE
NRBC # BLD AUTO: 0 10E3/UL
NRBC # BLD AUTO: 0 10E3/UL
NRBC BLD AUTO-RTO: 0 /100
NRBC BLD AUTO-RTO: 0 /100
NT-PROBNP SERPL-MCNC: ABNORMAL PG/ML (ref 0–900)
O2/TOTAL GAS SETTING VFR VENT: 21 %
OTHER CELLS FLD MANUAL: 2 %
OXYHGB MFR BLDV: 26 % (ref 70–75)
OXYHGB MFR BLDV: 42 % (ref 70–75)
OXYHGB MFR BLDV: 54 % (ref 70–75)
P AXIS - MUSE: NORMAL DEGREES
PCO2 BLDV: 47 MM HG (ref 40–50)
PCO2 BLDV: 48 MM HG (ref 40–50)
PCO2 BLDV: 56 MM HG (ref 40–50)
PH BLDV: 7.28 [PH] (ref 7.32–7.43)
PH BLDV: 7.38 [PH] (ref 7.32–7.43)
PH BLDV: 7.41 [PH] (ref 7.32–7.43)
PH BODY FLUID SOURCE: NORMAL
PH FLD: 8 PH
PH UR STRIP: 5.5 [PH] (ref 5–7)
PHOSPHATE SERPL-MCNC: 4.1 MG/DL (ref 2.5–4.5)
PLATELET # BLD AUTO: 141 10E3/UL (ref 150–450)
PLATELET # BLD AUTO: 156 10E3/UL (ref 150–450)
PLATELET # BLD AUTO: 164 10E3/UL (ref 150–450)
PLATELET # BLD AUTO: 188 10E3/UL (ref 150–450)
PLATELET # BLD AUTO: 217 10E3/UL (ref 150–450)
PLATELET # BLD AUTO: 229 10E3/UL (ref 150–450)
PLATELET # BLD AUTO: 234 10E3/UL (ref 150–450)
PLATELET # BLD AUTO: 318 10E3/UL (ref 150–450)
PLATELET # BLD AUTO: 318 10E3/UL (ref 150–450)
PLATELET # BLD AUTO: 328 10E3/UL (ref 150–450)
PLATELET # BLD AUTO: 333 10E3/UL (ref 150–450)
PLATELET # BLD AUTO: 469 10E3/UL (ref 150–450)
PO2 BLDV: 23 MM HG (ref 25–47)
PO2 BLDV: 26 MM HG (ref 25–47)
PO2 BLDV: 33 MM HG (ref 25–47)
POTASSIUM SERPL-SCNC: 2.5 MMOL/L (ref 3.4–5.3)
POTASSIUM SERPL-SCNC: 2.7 MMOL/L (ref 3.4–5.3)
POTASSIUM SERPL-SCNC: 2.7 MMOL/L (ref 3.4–5.3)
POTASSIUM SERPL-SCNC: 2.8 MMOL/L (ref 3.4–5.3)
POTASSIUM SERPL-SCNC: 3.2 MMOL/L (ref 3.4–5.3)
POTASSIUM SERPL-SCNC: 3.3 MMOL/L (ref 3.4–5.3)
POTASSIUM SERPL-SCNC: 3.3 MMOL/L (ref 3.4–5.3)
POTASSIUM SERPL-SCNC: 3.4 MMOL/L (ref 3.4–5.3)
POTASSIUM SERPL-SCNC: 3.5 MMOL/L (ref 3.4–5.3)
POTASSIUM SERPL-SCNC: 3.5 MMOL/L (ref 3.4–5.3)
POTASSIUM SERPL-SCNC: 3.6 MMOL/L (ref 3.4–5.3)
POTASSIUM SERPL-SCNC: 3.8 MMOL/L (ref 3.4–5.3)
POTASSIUM SERPL-SCNC: 3.8 MMOL/L (ref 3.4–5.3)
POTASSIUM SERPL-SCNC: 4 MMOL/L (ref 3.4–5.3)
POTASSIUM SERPL-SCNC: 4.4 MMOL/L (ref 3.4–5.3)
PR INTERVAL - MUSE: NORMAL MS
PROCALCITONIN SERPL IA-MCNC: 1.01 NG/ML
PROCALCITONIN SERPL IA-MCNC: 1.78 NG/ML
PROT FLD-MCNC: 1.8 G/DL
PROT SERPL-MCNC: 4 G/DL (ref 6.4–8.3)
PROT SERPL-MCNC: 4.8 G/DL (ref 6.4–8.3)
PROT SERPL-MCNC: 5.1 G/DL (ref 6.4–8.3)
PROT SERPL-MCNC: 5.2 G/DL (ref 6.4–8.3)
PROTEIN BODY FLUID SOURCE: NORMAL
QRS DURATION - MUSE: 144 MS
QRS DURATION - MUSE: 84 MS
QRS DURATION - MUSE: 88 MS
QRS DURATION - MUSE: 94 MS
QT - MUSE: 236 MS
QT - MUSE: 246 MS
QT - MUSE: 250 MS
QT - MUSE: 274 MS
QTC - MUSE: 393 MS
QTC - MUSE: 396 MS
QTC - MUSE: 416 MS
QTC - MUSE: 418 MS
R AXIS - MUSE: -4 DEGREES
R AXIS - MUSE: 12 DEGREES
R AXIS - MUSE: 14 DEGREES
R AXIS - MUSE: 5 DEGREES
RBC # BLD AUTO: 2.13 10E6/UL (ref 4.4–5.9)
RBC # BLD AUTO: 2.22 10E6/UL (ref 4.4–5.9)
RBC # BLD AUTO: 2.37 10E6/UL (ref 4.4–5.9)
RBC # BLD AUTO: 2.37 10E6/UL (ref 4.4–5.9)
RBC # BLD AUTO: 2.43 10E6/UL (ref 4.4–5.9)
RBC # BLD AUTO: 2.45 10E6/UL (ref 4.4–5.9)
RBC # BLD AUTO: 2.58 10E6/UL (ref 4.4–5.9)
RBC # BLD AUTO: 2.69 10E6/UL (ref 4.4–5.9)
RBC # BLD AUTO: 2.7 10E6/UL (ref 4.4–5.9)
RBC # BLD AUTO: 2.89 10E6/UL (ref 4.4–5.9)
RBC # BLD AUTO: 3.28 10E6/UL (ref 4.4–5.9)
RBC # BLD AUTO: 3.42 10E6/UL (ref 4.4–5.9)
RBC URINE: 2 /HPF
SA TARGET DNA: NEGATIVE
SAO2 % BLDV: 26.5 % (ref 70–75)
SAO2 % BLDV: 42.8 % (ref 70–75)
SAO2 % BLDV: 55 % (ref 70–75)
SODIUM SERPL-SCNC: 131 MMOL/L (ref 135–145)
SODIUM SERPL-SCNC: 132 MMOL/L (ref 135–145)
SODIUM SERPL-SCNC: 133 MMOL/L (ref 135–145)
SODIUM SERPL-SCNC: 133 MMOL/L (ref 135–145)
SODIUM SERPL-SCNC: 134 MMOL/L (ref 135–145)
SODIUM SERPL-SCNC: 134 MMOL/L (ref 135–145)
SODIUM SERPL-SCNC: 135 MMOL/L (ref 135–145)
SODIUM SERPL-SCNC: 135 MMOL/L (ref 135–145)
SODIUM SERPL-SCNC: 137 MMOL/L (ref 135–145)
SODIUM SERPL-SCNC: 138 MMOL/L (ref 135–145)
SODIUM SERPL-SCNC: 138 MMOL/L (ref 135–145)
SP GR UR STRIP: 1.03 (ref 1–1.03)
SPECIMEN EXPIRATION DATE: NORMAL
SQUAMOUS EPITHELIAL: <1 /HPF
SYSTOLIC BLOOD PRESSURE - MUSE: 100 MMHG
SYSTOLIC BLOOD PRESSURE - MUSE: 77 MMHG
SYSTOLIC BLOOD PRESSURE - MUSE: NORMAL MMHG
SYSTOLIC BLOOD PRESSURE - MUSE: NORMAL MMHG
T AXIS - MUSE: 180 DEGREES
T AXIS - MUSE: 192 DEGREES
T AXIS - MUSE: 193 DEGREES
T AXIS - MUSE: 205 DEGREES
T4 FREE SERPL-MCNC: 1.05 NG/DL (ref 0.9–1.7)
TROPONIN T SERPL HS-MCNC: 166 NG/L
TROPONIN T SERPL HS-MCNC: 167 NG/L
TROPONIN T SERPL HS-MCNC: 168 NG/L
TSH SERPL DL<=0.005 MIU/L-ACNC: 16.2 UIU/ML (ref 0.3–4.2)
UNIT ABO/RH: NORMAL
UNIT NUMBER: NORMAL
UNIT STATUS: NORMAL
UNIT TYPE ISBT: 9500
UROBILINOGEN UR STRIP-MCNC: <2 MG/DL
VANCOMYCIN SERPL-MCNC: 13.4 UG/ML
VANCOMYCIN SERPL-MCNC: 8.1 UG/ML
VENTRICULAR RATE- MUSE: 140 BPM
VENTRICULAR RATE- MUSE: 149 BPM
VENTRICULAR RATE- MUSE: 156 BPM
VENTRICULAR RATE- MUSE: 187 BPM
WBC # BLD AUTO: 10.2 10E3/UL (ref 4–11)
WBC # BLD AUTO: 10.4 10E3/UL (ref 4–11)
WBC # BLD AUTO: 10.7 10E3/UL (ref 4–11)
WBC # BLD AUTO: 11.9 10E3/UL (ref 4–11)
WBC # BLD AUTO: 12.7 10E3/UL (ref 4–11)
WBC # BLD AUTO: 16.4 10E3/UL (ref 4–11)
WBC # BLD AUTO: 17.5 10E3/UL (ref 4–11)
WBC # BLD AUTO: 17.8 10E3/UL (ref 4–11)
WBC # BLD AUTO: 25 10E3/UL (ref 4–11)
WBC # BLD AUTO: 7.4 10E3/UL (ref 4–11)
WBC # BLD AUTO: 8.9 10E3/UL (ref 4–11)
WBC # BLD AUTO: 9.6 10E3/UL (ref 4–11)
WBC # FLD AUTO: 4817 /UL
WBC URINE: 1 /HPF

## 2024-01-01 PROCEDURE — 85610 PROTHROMBIN TIME: CPT | Performed by: NURSE PRACTITIONER

## 2024-01-01 PROCEDURE — 250N000013 HC RX MED GY IP 250 OP 250 PS 637: Performed by: HOSPITALIST

## 2024-01-01 PROCEDURE — 250N000011 HC RX IP 250 OP 636: Performed by: HOSPITALIST

## 2024-01-01 PROCEDURE — 93306 TTE W/DOPPLER COMPLETE: CPT | Mod: 26 | Performed by: INTERNAL MEDICINE

## 2024-01-01 PROCEDURE — 87075 CULTR BACTERIA EXCEPT BLOOD: CPT | Performed by: INTERNAL MEDICINE

## 2024-01-01 PROCEDURE — 80048 BASIC METABOLIC PNL TOTAL CA: CPT | Performed by: INTERNAL MEDICINE

## 2024-01-01 PROCEDURE — 85018 HEMOGLOBIN: CPT | Performed by: INTERNAL MEDICINE

## 2024-01-01 PROCEDURE — 83735 ASSAY OF MAGNESIUM: CPT | Performed by: EMERGENCY MEDICINE

## 2024-01-01 PROCEDURE — 96376 TX/PRO/DX INJ SAME DRUG ADON: CPT

## 2024-01-01 PROCEDURE — 87040 BLOOD CULTURE FOR BACTERIA: CPT | Performed by: INTERNAL MEDICINE

## 2024-01-01 PROCEDURE — 84443 ASSAY THYROID STIM HORMONE: CPT | Performed by: EMERGENCY MEDICINE

## 2024-01-01 PROCEDURE — 80202 ASSAY OF VANCOMYCIN: CPT | Performed by: STUDENT IN AN ORGANIZED HEALTH CARE EDUCATION/TRAINING PROGRAM

## 2024-01-01 PROCEDURE — 99291 CRITICAL CARE FIRST HOUR: CPT | Mod: 25

## 2024-01-01 PROCEDURE — 83735 ASSAY OF MAGNESIUM: CPT | Performed by: INTERNAL MEDICINE

## 2024-01-01 PROCEDURE — 97162 PT EVAL MOD COMPLEX 30 MIN: CPT | Mod: GP

## 2024-01-01 PROCEDURE — 82040 ASSAY OF SERUM ALBUMIN: CPT | Performed by: STUDENT IN AN ORGANIZED HEALTH CARE EDUCATION/TRAINING PROGRAM

## 2024-01-01 PROCEDURE — 250N000011 HC RX IP 250 OP 636: Performed by: INTERNAL MEDICINE

## 2024-01-01 PROCEDURE — 85610 PROTHROMBIN TIME: CPT | Performed by: HOSPITALIST

## 2024-01-01 PROCEDURE — 0W993ZZ DRAINAGE OF RIGHT PLEURAL CAVITY, PERCUTANEOUS APPROACH: ICD-10-PCS | Performed by: INTERNAL MEDICINE

## 2024-01-01 PROCEDURE — 82533 TOTAL CORTISOL: CPT | Performed by: INTERNAL MEDICINE

## 2024-01-01 PROCEDURE — 250N000013 HC RX MED GY IP 250 OP 250 PS 637: Performed by: INTERNAL MEDICINE

## 2024-01-01 PROCEDURE — 3E033XZ INTRODUCTION OF VASOPRESSOR INTO PERIPHERAL VEIN, PERCUTANEOUS APPROACH: ICD-10-PCS | Performed by: INTERNAL MEDICINE

## 2024-01-01 PROCEDURE — 85610 PROTHROMBIN TIME: CPT | Performed by: INTERNAL MEDICINE

## 2024-01-01 PROCEDURE — 250N000011 HC RX IP 250 OP 636: Mod: JZ | Performed by: PHYSICIAN ASSISTANT

## 2024-01-01 PROCEDURE — 80051 ELECTROLYTE PANEL: CPT | Performed by: NURSE PRACTITIONER

## 2024-01-01 PROCEDURE — 99292 CRITICAL CARE ADDL 30 MIN: CPT

## 2024-01-01 PROCEDURE — 250N000009 HC RX 250: Performed by: EMERGENCY MEDICINE

## 2024-01-01 PROCEDURE — 258N000003 HC RX IP 258 OP 636: Performed by: PHYSICIAN ASSISTANT

## 2024-01-01 PROCEDURE — 90937 HEMODIALYSIS REPEATED EVAL: CPT

## 2024-01-01 PROCEDURE — 250N000013 HC RX MED GY IP 250 OP 250 PS 637: Performed by: STUDENT IN AN ORGANIZED HEALTH CARE EDUCATION/TRAINING PROGRAM

## 2024-01-01 PROCEDURE — 70450 CT HEAD/BRAIN W/O DYE: CPT | Mod: MG

## 2024-01-01 PROCEDURE — 250N000013 HC RX MED GY IP 250 OP 250 PS 637: Performed by: FAMILY MEDICINE

## 2024-01-01 PROCEDURE — 82962 GLUCOSE BLOOD TEST: CPT

## 2024-01-01 PROCEDURE — 86140 C-REACTIVE PROTEIN: CPT | Performed by: EMERGENCY MEDICINE

## 2024-01-01 PROCEDURE — 82533 TOTAL CORTISOL: CPT | Performed by: EMERGENCY MEDICINE

## 2024-01-01 PROCEDURE — 82374 ASSAY BLOOD CARBON DIOXIDE: CPT | Performed by: NURSE PRACTITIONER

## 2024-01-01 PROCEDURE — 99291 CRITICAL CARE FIRST HOUR: CPT | Performed by: INTERNAL MEDICINE

## 2024-01-01 PROCEDURE — 85027 COMPLETE CBC AUTOMATED: CPT | Performed by: INTERNAL MEDICINE

## 2024-01-01 PROCEDURE — 120N000004 HC R&B MS OVERFLOW

## 2024-01-01 PROCEDURE — 84132 ASSAY OF SERUM POTASSIUM: CPT | Performed by: HOSPITALIST

## 2024-01-01 PROCEDURE — 99232 SBSQ HOSP IP/OBS MODERATE 35: CPT | Performed by: PHYSICIAN ASSISTANT

## 2024-01-01 PROCEDURE — 97530 THERAPEUTIC ACTIVITIES: CPT | Mod: GP

## 2024-01-01 PROCEDURE — 86900 BLOOD TYPING SEROLOGIC ABO: CPT | Performed by: HOSPITALIST

## 2024-01-01 PROCEDURE — 71045 X-RAY EXAM CHEST 1 VIEW: CPT

## 2024-01-01 PROCEDURE — P9604 ONE-WAY ALLOW PRORATED TRIP: HCPCS | Performed by: NURSE PRACTITIONER

## 2024-01-01 PROCEDURE — 85018 HEMOGLOBIN: CPT | Performed by: NURSE PRACTITIONER

## 2024-01-01 PROCEDURE — 88112 CYTOPATH CELL ENHANCE TECH: CPT | Mod: TC | Performed by: INTERNAL MEDICINE

## 2024-01-01 PROCEDURE — 81001 URINALYSIS AUTO W/SCOPE: CPT | Performed by: EMERGENCY MEDICINE

## 2024-01-01 PROCEDURE — 85018 HEMOGLOBIN: CPT | Performed by: HOSPITALIST

## 2024-01-01 PROCEDURE — 36592 COLLECT BLOOD FROM PICC: CPT | Performed by: STUDENT IN AN ORGANIZED HEALTH CARE EDUCATION/TRAINING PROGRAM

## 2024-01-01 PROCEDURE — 99232 SBSQ HOSP IP/OBS MODERATE 35: CPT | Performed by: NURSE PRACTITIONER

## 2024-01-01 PROCEDURE — 99232 SBSQ HOSP IP/OBS MODERATE 35: CPT | Performed by: INTERNAL MEDICINE

## 2024-01-01 PROCEDURE — 87205 SMEAR GRAM STAIN: CPT | Performed by: INTERNAL MEDICINE

## 2024-01-01 PROCEDURE — P9016 RBC LEUKOCYTES REDUCED: HCPCS | Performed by: HOSPITALIST

## 2024-01-01 PROCEDURE — G1010 CDSM STANSON: HCPCS

## 2024-01-01 PROCEDURE — 92526 ORAL FUNCTION THERAPY: CPT | Mod: GN

## 2024-01-01 PROCEDURE — 84132 ASSAY OF SERUM POTASSIUM: CPT | Performed by: INTERNAL MEDICINE

## 2024-01-01 PROCEDURE — 82947 ASSAY GLUCOSE BLOOD QUANT: CPT | Performed by: HOSPITALIST

## 2024-01-01 PROCEDURE — 82947 ASSAY GLUCOSE BLOOD QUANT: CPT | Performed by: INTERNAL MEDICINE

## 2024-01-01 PROCEDURE — 99283 EMERGENCY DEPT VISIT LOW MDM: CPT

## 2024-01-01 PROCEDURE — 93005 ELECTROCARDIOGRAM TRACING: CPT | Performed by: HOSPITALIST

## 2024-01-01 PROCEDURE — G0463 HOSPITAL OUTPT CLINIC VISIT: HCPCS | Mod: 25

## 2024-01-01 PROCEDURE — P9047 ALBUMIN (HUMAN), 25%, 50ML: HCPCS | Mod: JZ | Performed by: PHYSICIAN ASSISTANT

## 2024-01-01 PROCEDURE — 36415 COLL VENOUS BLD VENIPUNCTURE: CPT | Performed by: FAMILY MEDICINE

## 2024-01-01 PROCEDURE — 82330 ASSAY OF CALCIUM: CPT | Performed by: INTERNAL MEDICINE

## 2024-01-01 PROCEDURE — P9045 ALBUMIN (HUMAN), 5%, 250 ML: HCPCS | Performed by: PHYSICIAN ASSISTANT

## 2024-01-01 PROCEDURE — 84439 ASSAY OF FREE THYROXINE: CPT | Performed by: EMERGENCY MEDICINE

## 2024-01-01 PROCEDURE — 99233 SBSQ HOSP IP/OBS HIGH 50: CPT | Performed by: HOSPITALIST

## 2024-01-01 PROCEDURE — 250N000011 HC RX IP 250 OP 636: Performed by: STUDENT IN AN ORGANIZED HEALTH CARE EDUCATION/TRAINING PROGRAM

## 2024-01-01 PROCEDURE — 999N000065 XR PELVIS PORT 1/2 VIEWS

## 2024-01-01 PROCEDURE — 36415 COLL VENOUS BLD VENIPUNCTURE: CPT | Performed by: NURSE PRACTITIONER

## 2024-01-01 PROCEDURE — 82248 BILIRUBIN DIRECT: CPT | Performed by: HOSPITALIST

## 2024-01-01 PROCEDURE — 258N000003 HC RX IP 258 OP 636: Performed by: HOSPITALIST

## 2024-01-01 PROCEDURE — 250N000009 HC RX 250: Performed by: HOSPITALIST

## 2024-01-01 PROCEDURE — 85041 AUTOMATED RBC COUNT: CPT | Performed by: NURSE PRACTITIONER

## 2024-01-01 PROCEDURE — 73070 X-RAY EXAM OF ELBOW: CPT | Mod: LT

## 2024-01-01 PROCEDURE — 80048 BASIC METABOLIC PNL TOTAL CA: CPT | Performed by: NURSE PRACTITIONER

## 2024-01-01 PROCEDURE — P9603 ONE-WAY ALLOW PRORATED MILES: HCPCS | Performed by: NURSE PRACTITIONER

## 2024-01-01 PROCEDURE — 272N000500 HC NEEDLE CR2

## 2024-01-01 PROCEDURE — 83986 ASSAY PH BODY FLUID NOS: CPT | Performed by: INTERNAL MEDICINE

## 2024-01-01 PROCEDURE — 250N000013 HC RX MED GY IP 250 OP 250 PS 637: Performed by: EMERGENCY MEDICINE

## 2024-01-01 PROCEDURE — 80053 COMPREHEN METABOLIC PANEL: CPT | Performed by: INTERNAL MEDICINE

## 2024-01-01 PROCEDURE — 93010 ELECTROCARDIOGRAM REPORT: CPT | Mod: RTG | Performed by: STUDENT IN AN ORGANIZED HEALTH CARE EDUCATION/TRAINING PROGRAM

## 2024-01-01 PROCEDURE — 250N000009 HC RX 250: Performed by: INTERNAL MEDICINE

## 2024-01-01 PROCEDURE — 258N000003 HC RX IP 258 OP 636: Performed by: INTERNAL MEDICINE

## 2024-01-01 PROCEDURE — 258N000001 HC RX 258: Performed by: HOSPITALIST

## 2024-01-01 PROCEDURE — 99309 SBSQ NF CARE MODERATE MDM 30: CPT | Performed by: NURSE PRACTITIONER

## 2024-01-01 PROCEDURE — 255N000002 HC RX 255 OP 636: Performed by: INTERNAL MEDICINE

## 2024-01-01 PROCEDURE — 80048 BASIC METABOLIC PNL TOTAL CA: CPT | Performed by: EMERGENCY MEDICINE

## 2024-01-01 PROCEDURE — 82805 BLOOD GASES W/O2 SATURATION: CPT | Performed by: EMERGENCY MEDICINE

## 2024-01-01 PROCEDURE — 83735 ASSAY OF MAGNESIUM: CPT | Performed by: HOSPITALIST

## 2024-01-01 PROCEDURE — 84157 ASSAY OF PROTEIN OTHER: CPT | Performed by: INTERNAL MEDICINE

## 2024-01-01 PROCEDURE — 82565 ASSAY OF CREATININE: CPT | Performed by: NURSE PRACTITIONER

## 2024-01-01 PROCEDURE — 85730 THROMBOPLASTIN TIME PARTIAL: CPT | Performed by: EMERGENCY MEDICINE

## 2024-01-01 PROCEDURE — 87641 MR-STAPH DNA AMP PROBE: CPT | Performed by: INTERNAL MEDICINE

## 2024-01-01 PROCEDURE — 200N000001 HC R&B ICU

## 2024-01-01 PROCEDURE — 96367 TX/PROPH/DG ADDL SEQ IV INF: CPT

## 2024-01-01 PROCEDURE — 84155 ASSAY OF PROTEIN SERUM: CPT | Performed by: INTERNAL MEDICINE

## 2024-01-01 PROCEDURE — 88112 CYTOPATH CELL ENHANCE TECH: CPT | Mod: 26 | Performed by: PATHOLOGY

## 2024-01-01 PROCEDURE — 92610 EVALUATE SWALLOWING FUNCTION: CPT | Mod: GN

## 2024-01-01 PROCEDURE — 87340 HEPATITIS B SURFACE AG IA: CPT | Performed by: PHYSICIAN ASSISTANT

## 2024-01-01 PROCEDURE — 83605 ASSAY OF LACTIC ACID: CPT | Performed by: STUDENT IN AN ORGANIZED HEALTH CARE EDUCATION/TRAINING PROGRAM

## 2024-01-01 PROCEDURE — 85014 HEMATOCRIT: CPT | Performed by: NURSE PRACTITIONER

## 2024-01-01 PROCEDURE — 82805 BLOOD GASES W/O2 SATURATION: CPT | Performed by: INTERNAL MEDICINE

## 2024-01-01 PROCEDURE — 82947 ASSAY GLUCOSE BLOOD QUANT: CPT | Performed by: STUDENT IN AN ORGANIZED HEALTH CARE EDUCATION/TRAINING PROGRAM

## 2024-01-01 PROCEDURE — 90935 HEMODIALYSIS ONE EVALUATION: CPT

## 2024-01-01 PROCEDURE — 85610 PROTHROMBIN TIME: CPT | Performed by: STUDENT IN AN ORGANIZED HEALTH CARE EDUCATION/TRAINING PROGRAM

## 2024-01-01 PROCEDURE — 99233 SBSQ HOSP IP/OBS HIGH 50: CPT | Performed by: EMERGENCY MEDICINE

## 2024-01-01 PROCEDURE — 250N000011 HC RX IP 250 OP 636: Performed by: PHYSICIAN ASSISTANT

## 2024-01-01 PROCEDURE — 258N000001 HC RX 258: Performed by: EMERGENCY MEDICINE

## 2024-01-01 PROCEDURE — 93005 ELECTROCARDIOGRAM TRACING: CPT | Performed by: STUDENT IN AN ORGANIZED HEALTH CARE EDUCATION/TRAINING PROGRAM

## 2024-01-01 PROCEDURE — 89051 BODY FLUID CELL COUNT: CPT | Performed by: INTERNAL MEDICINE

## 2024-01-01 PROCEDURE — 120N000013 HC R&B IMCU

## 2024-01-01 PROCEDURE — 83615 LACTATE (LD) (LDH) ENZYME: CPT | Performed by: INTERNAL MEDICINE

## 2024-01-01 PROCEDURE — 999N000157 HC STATISTIC RCP TIME EA 10 MIN

## 2024-01-01 PROCEDURE — 83605 ASSAY OF LACTIC ACID: CPT | Performed by: INTERNAL MEDICINE

## 2024-01-01 PROCEDURE — 85027 COMPLETE CBC AUTOMATED: CPT | Performed by: HOSPITALIST

## 2024-01-01 PROCEDURE — 84145 PROCALCITONIN (PCT): CPT | Performed by: INTERNAL MEDICINE

## 2024-01-01 PROCEDURE — 88305 TISSUE EXAM BY PATHOLOGIST: CPT | Mod: TC | Performed by: INTERNAL MEDICINE

## 2024-01-01 PROCEDURE — 96375 TX/PRO/DX INJ NEW DRUG ADDON: CPT

## 2024-01-01 PROCEDURE — 84484 ASSAY OF TROPONIN QUANT: CPT | Performed by: EMERGENCY MEDICINE

## 2024-01-01 PROCEDURE — 5A1D70Z PERFORMANCE OF URINARY FILTRATION, INTERMITTENT, LESS THAN 6 HOURS PER DAY: ICD-10-PCS | Performed by: STUDENT IN AN ORGANIZED HEALTH CARE EDUCATION/TRAINING PROGRAM

## 2024-01-01 PROCEDURE — 86923 COMPATIBILITY TEST ELECTRIC: CPT | Performed by: HOSPITALIST

## 2024-01-01 PROCEDURE — 82247 BILIRUBIN TOTAL: CPT | Performed by: EMERGENCY MEDICINE

## 2024-01-01 PROCEDURE — 73560 X-RAY EXAM OF KNEE 1 OR 2: CPT | Mod: LT

## 2024-01-01 PROCEDURE — 84155 ASSAY OF PROTEIN SERUM: CPT | Performed by: EMERGENCY MEDICINE

## 2024-01-01 PROCEDURE — 85610 PROTHROMBIN TIME: CPT | Performed by: FAMILY MEDICINE

## 2024-01-01 PROCEDURE — 272N000004 HC RX 272: Performed by: FAMILY MEDICINE

## 2024-01-01 PROCEDURE — 96365 THER/PROPH/DIAG IV INF INIT: CPT

## 2024-01-01 PROCEDURE — 99233 SBSQ HOSP IP/OBS HIGH 50: CPT | Performed by: INTERNAL MEDICINE

## 2024-01-01 PROCEDURE — 999N000065 XR CHEST PORT 1 VIEW

## 2024-01-01 PROCEDURE — 97110 THERAPEUTIC EXERCISES: CPT | Mod: GP

## 2024-01-01 PROCEDURE — 99232 SBSQ HOSP IP/OBS MODERATE 35: CPT | Performed by: STUDENT IN AN ORGANIZED HEALTH CARE EDUCATION/TRAINING PROGRAM

## 2024-01-01 PROCEDURE — P9604 ONE-WAY ALLOW PRORATED TRIP: HCPCS | Performed by: FAMILY MEDICINE

## 2024-01-01 PROCEDURE — 99238 HOSP IP/OBS DSCHRG MGMT 30/<: CPT | Performed by: INTERNAL MEDICINE

## 2024-01-01 PROCEDURE — 36592 COLLECT BLOOD FROM PICC: CPT | Performed by: INTERNAL MEDICINE

## 2024-01-01 PROCEDURE — 99291 CRITICAL CARE FIRST HOUR: CPT | Mod: 25 | Performed by: INTERNAL MEDICINE

## 2024-01-01 PROCEDURE — 32551 INSERTION OF CHEST TUBE: CPT | Performed by: INTERNAL MEDICINE

## 2024-01-01 PROCEDURE — 85025 COMPLETE CBC W/AUTO DIFF WBC: CPT | Performed by: EMERGENCY MEDICINE

## 2024-01-01 PROCEDURE — 93005 ELECTROCARDIOGRAM TRACING: CPT

## 2024-01-01 PROCEDURE — 87070 CULTURE OTHR SPECIMN AEROBIC: CPT | Performed by: INTERNAL MEDICINE

## 2024-01-01 PROCEDURE — P9045 ALBUMIN (HUMAN), 5%, 250 ML: HCPCS | Performed by: INTERNAL MEDICINE

## 2024-01-01 PROCEDURE — 96366 THER/PROPH/DIAG IV INF ADDON: CPT

## 2024-01-01 PROCEDURE — 250N000011 HC RX IP 250 OP 636: Performed by: EMERGENCY MEDICINE

## 2024-01-01 PROCEDURE — C8929 TTE W OR WO FOL WCON,DOPPLER: HCPCS

## 2024-01-01 PROCEDURE — 88305 TISSUE EXAM BY PATHOLOGIST: CPT | Mod: 26 | Performed by: PATHOLOGY

## 2024-01-01 PROCEDURE — 36415 COLL VENOUS BLD VENIPUNCTURE: CPT | Performed by: EMERGENCY MEDICINE

## 2024-01-01 PROCEDURE — 93005 ELECTROCARDIOGRAM TRACING: CPT | Performed by: EMERGENCY MEDICINE

## 2024-01-01 PROCEDURE — 99222 1ST HOSP IP/OBS MODERATE 55: CPT | Performed by: INTERNAL MEDICINE

## 2024-01-01 PROCEDURE — 84484 ASSAY OF TROPONIN QUANT: CPT | Performed by: STUDENT IN AN ORGANIZED HEALTH CARE EDUCATION/TRAINING PROGRAM

## 2024-01-01 PROCEDURE — 85014 HEMATOCRIT: CPT | Performed by: FAMILY MEDICINE

## 2024-01-01 PROCEDURE — 250N000009 HC RX 250: Performed by: STUDENT IN AN ORGANIZED HEALTH CARE EDUCATION/TRAINING PROGRAM

## 2024-01-01 PROCEDURE — 80202 ASSAY OF VANCOMYCIN: CPT | Performed by: HOSPITALIST

## 2024-01-01 PROCEDURE — 99223 1ST HOSP IP/OBS HIGH 75: CPT | Mod: AI | Performed by: STUDENT IN AN ORGANIZED HEALTH CARE EDUCATION/TRAINING PROGRAM

## 2024-01-01 PROCEDURE — P9047 ALBUMIN (HUMAN), 25%, 50ML: HCPCS | Performed by: HOSPITALIST

## 2024-01-01 PROCEDURE — 87040 BLOOD CULTURE FOR BACTERIA: CPT | Performed by: EMERGENCY MEDICINE

## 2024-01-01 PROCEDURE — 99222 1ST HOSP IP/OBS MODERATE 55: CPT | Performed by: CLINICAL NURSE SPECIALIST

## 2024-01-01 PROCEDURE — 99222 1ST HOSP IP/OBS MODERATE 55: CPT | Performed by: PHYSICIAN ASSISTANT

## 2024-01-01 PROCEDURE — 83880 ASSAY OF NATRIURETIC PEPTIDE: CPT | Performed by: EMERGENCY MEDICINE

## 2024-01-01 PROCEDURE — 258N000003 HC RX IP 258 OP 636: Performed by: STUDENT IN AN ORGANIZED HEALTH CARE EDUCATION/TRAINING PROGRAM

## 2024-01-01 PROCEDURE — 82945 GLUCOSE OTHER FLUID: CPT | Performed by: INTERNAL MEDICINE

## 2024-01-01 PROCEDURE — 96361 HYDRATE IV INFUSION ADD-ON: CPT

## 2024-01-01 PROCEDURE — 83605 ASSAY OF LACTIC ACID: CPT | Performed by: EMERGENCY MEDICINE

## 2024-01-01 PROCEDURE — 85610 PROTHROMBIN TIME: CPT | Performed by: EMERGENCY MEDICINE

## 2024-01-01 PROCEDURE — 71250 CT THORAX DX C-: CPT | Mod: MG

## 2024-01-01 PROCEDURE — 84132 ASSAY OF SERUM POTASSIUM: CPT | Performed by: STUDENT IN AN ORGANIZED HEALTH CARE EDUCATION/TRAINING PROGRAM

## 2024-01-01 PROCEDURE — 258N000003 HC RX IP 258 OP 636: Performed by: EMERGENCY MEDICINE

## 2024-01-01 PROCEDURE — 85025 COMPLETE CBC W/AUTO DIFF WBC: CPT | Performed by: INTERNAL MEDICINE

## 2024-01-01 PROCEDURE — 80053 COMPREHEN METABOLIC PANEL: CPT | Performed by: HOSPITALIST

## 2024-01-01 PROCEDURE — 87640 STAPH A DNA AMP PROBE: CPT | Performed by: INTERNAL MEDICINE

## 2024-01-01 PROCEDURE — 86706 HEP B SURFACE ANTIBODY: CPT | Performed by: PHYSICIAN ASSISTANT

## 2024-01-01 PROCEDURE — 80162 ASSAY OF DIGOXIN TOTAL: CPT | Performed by: EMERGENCY MEDICINE

## 2024-01-01 PROCEDURE — 89050 BODY FLUID CELL COUNT: CPT | Performed by: INTERNAL MEDICINE

## 2024-01-01 PROCEDURE — 36415 COLL VENOUS BLD VENIPUNCTURE: CPT | Performed by: INTERNAL MEDICINE

## 2024-01-01 PROCEDURE — 83735 ASSAY OF MAGNESIUM: CPT | Performed by: NURSE PRACTITIONER

## 2024-01-01 RX ORDER — METHOCARBAMOL 750 MG/1
750 TABLET, FILM COATED ORAL 3 TIMES DAILY
Status: DISCONTINUED | OUTPATIENT
Start: 2024-01-01 | End: 2024-01-01

## 2024-01-01 RX ORDER — PHENYLEPHRINE HCL IN 0.9% NACL 50MG/250ML
.1-6 PLASTIC BAG, INJECTION (ML) INTRAVENOUS CONTINUOUS
Status: DISCONTINUED | OUTPATIENT
Start: 2024-01-01 | End: 2024-01-01 | Stop reason: HOSPADM

## 2024-01-01 RX ORDER — OXYCODONE HYDROCHLORIDE 5 MG/1
10 TABLET ORAL EVERY 4 HOURS PRN
COMMUNITY
End: 2024-01-01

## 2024-01-01 RX ORDER — POTASSIUM CHLORIDE 20MEQ/15ML
20 LIQUID (ML) ORAL ONCE
Status: COMPLETED | OUTPATIENT
Start: 2024-01-01 | End: 2024-01-01

## 2024-01-01 RX ORDER — ALBUMIN (HUMAN) 12.5 G/50ML
SOLUTION INTRAVENOUS
Status: COMPLETED
Start: 2024-01-01 | End: 2024-01-01

## 2024-01-01 RX ORDER — ALBUMIN (HUMAN) 12.5 G/50ML
50 SOLUTION INTRAVENOUS ONCE
Status: COMPLETED | OUTPATIENT
Start: 2024-01-01 | End: 2024-01-01

## 2024-01-01 RX ORDER — OXYCODONE HYDROCHLORIDE 5 MG/1
5 TABLET ORAL EVERY 4 HOURS PRN
Status: DISCONTINUED | OUTPATIENT
Start: 2024-01-01 | End: 2024-01-01 | Stop reason: HOSPADM

## 2024-01-01 RX ORDER — METOPROLOL SUCCINATE 25 MG/1
50 TABLET, EXTENDED RELEASE ORAL AT BEDTIME
Status: DISCONTINUED | OUTPATIENT
Start: 2024-01-01 | End: 2024-01-01

## 2024-01-01 RX ORDER — ACETAMINOPHEN 325 MG/10.15ML
1000 LIQUID ORAL 3 TIMES DAILY
Status: DISCONTINUED | OUTPATIENT
Start: 2024-01-01 | End: 2024-01-01

## 2024-01-01 RX ORDER — VANCOMYCIN HYDROCHLORIDE
1500 ONCE
Status: COMPLETED | OUTPATIENT
Start: 2024-01-01 | End: 2024-01-01

## 2024-01-01 RX ORDER — MAGNESIUM SULFATE HEPTAHYDRATE 40 MG/ML
2 INJECTION, SOLUTION INTRAVENOUS ONCE
Status: COMPLETED | OUTPATIENT
Start: 2024-01-01 | End: 2024-01-01

## 2024-01-01 RX ORDER — ACETAMINOPHEN 500 MG
1000 TABLET ORAL 3 TIMES DAILY
Status: DISCONTINUED | OUTPATIENT
Start: 2024-01-01 | End: 2024-01-01

## 2024-01-01 RX ORDER — FENTANYL CITRATE 50 UG/ML
INJECTION, SOLUTION INTRAMUSCULAR; INTRAVENOUS
Status: DISCONTINUED
Start: 2024-01-01 | End: 2024-01-01 | Stop reason: HOSPADM

## 2024-01-01 RX ORDER — FENTANYL CITRATE 50 UG/ML
INJECTION, SOLUTION INTRAMUSCULAR; INTRAVENOUS
Status: COMPLETED
Start: 2024-01-01 | End: 2024-01-01

## 2024-01-01 RX ORDER — NALOXONE HYDROCHLORIDE 0.4 MG/ML
0.2 INJECTION, SOLUTION INTRAMUSCULAR; INTRAVENOUS; SUBCUTANEOUS
Status: DISCONTINUED | OUTPATIENT
Start: 2024-01-01 | End: 2024-01-01 | Stop reason: HOSPADM

## 2024-01-01 RX ORDER — AMOXICILLIN 250 MG
1 CAPSULE ORAL 2 TIMES DAILY PRN
Status: DISCONTINUED | OUTPATIENT
Start: 2024-01-01 | End: 2024-01-01 | Stop reason: HOSPADM

## 2024-01-01 RX ORDER — POTASSIUM CHLORIDE 20MEQ/15ML
40 LIQUID (ML) ORAL ONCE
Status: COMPLETED | OUTPATIENT
Start: 2024-01-01 | End: 2024-01-01

## 2024-01-01 RX ORDER — CEFEPIME HYDROCHLORIDE 2 G/1
2 INJECTION, POWDER, FOR SOLUTION INTRAVENOUS ONCE
Status: COMPLETED | OUTPATIENT
Start: 2024-01-01 | End: 2024-01-01

## 2024-01-01 RX ORDER — METHOCARBAMOL 750 MG/1
750 TABLET, FILM COATED ORAL 3 TIMES DAILY
COMMUNITY

## 2024-01-01 RX ORDER — WARFARIN SODIUM 5 MG/1
5 TABLET ORAL DAILY
COMMUNITY

## 2024-01-01 RX ORDER — METOPROLOL TARTRATE 1 MG/ML
5 INJECTION, SOLUTION INTRAVENOUS EVERY 5 MIN PRN
Status: DISCONTINUED | OUTPATIENT
Start: 2024-01-01 | End: 2024-01-01

## 2024-01-01 RX ORDER — MAGNESIUM OXIDE 400 MG/1
400 TABLET ORAL EVERY 4 HOURS
Status: COMPLETED | OUTPATIENT
Start: 2024-01-01 | End: 2024-01-01

## 2024-01-01 RX ORDER — CEFEPIME HYDROCHLORIDE 2 G/1
2 INJECTION, POWDER, FOR SOLUTION INTRAVENOUS EVERY 24 HOURS
Status: DISCONTINUED | OUTPATIENT
Start: 2024-01-01 | End: 2024-01-01

## 2024-01-01 RX ORDER — METOPROLOL TARTRATE 25 MG/1
25 TABLET, FILM COATED ORAL 2 TIMES DAILY
Status: DISCONTINUED | OUTPATIENT
Start: 2024-01-01 | End: 2024-01-01 | Stop reason: HOSPADM

## 2024-01-01 RX ORDER — FENTANYL CITRATE 50 UG/ML
50 INJECTION, SOLUTION INTRAMUSCULAR; INTRAVENOUS ONCE
Status: COMPLETED | OUTPATIENT
Start: 2024-01-01 | End: 2024-01-01

## 2024-01-01 RX ORDER — METHOCARBAMOL 750 MG/1
750 TABLET, FILM COATED ORAL 3 TIMES DAILY
COMMUNITY
End: 2024-01-01

## 2024-01-01 RX ORDER — NALOXONE HYDROCHLORIDE 0.4 MG/ML
0.4 INJECTION, SOLUTION INTRAMUSCULAR; INTRAVENOUS; SUBCUTANEOUS
Status: DISCONTINUED | OUTPATIENT
Start: 2024-01-01 | End: 2024-01-01 | Stop reason: HOSPADM

## 2024-01-01 RX ORDER — PROCHLORPERAZINE MALEATE 5 MG
5 TABLET ORAL EVERY 6 HOURS PRN
Status: DISCONTINUED | OUTPATIENT
Start: 2024-01-01 | End: 2024-01-01 | Stop reason: HOSPADM

## 2024-01-01 RX ORDER — BISACODYL 10 MG
10 SUPPOSITORY, RECTAL RECTAL
COMMUNITY

## 2024-01-01 RX ORDER — LIDOCAINE HYDROCHLORIDE 10 MG/ML
5 INJECTION, SOLUTION EPIDURAL; INFILTRATION; INTRACAUDAL; PERINEURAL
Status: DISCONTINUED | OUTPATIENT
Start: 2024-01-01 | End: 2024-01-01 | Stop reason: HOSPADM

## 2024-01-01 RX ORDER — HYDROMORPHONE HCL IN WATER/PF 6 MG/30 ML
0.1 PATIENT CONTROLLED ANALGESIA SYRINGE INTRAVENOUS
Status: DISCONTINUED | OUTPATIENT
Start: 2024-01-01 | End: 2024-01-01 | Stop reason: HOSPADM

## 2024-01-01 RX ORDER — DEXTROSE MONOHYDRATE 25 G/50ML
50 INJECTION, SOLUTION INTRAVENOUS ONCE
Status: COMPLETED | OUTPATIENT
Start: 2024-01-01 | End: 2024-01-01

## 2024-01-01 RX ORDER — LIDOCAINE 4 G/G
1 PATCH TOPICAL EVERY 24 HOURS
COMMUNITY
End: 2024-01-01

## 2024-01-01 RX ORDER — POTASSIUM CHLORIDE 20MEQ/15ML
10 LIQUID (ML) ORAL ONCE
Status: COMPLETED | OUTPATIENT
Start: 2024-01-01 | End: 2024-01-01

## 2024-01-01 RX ORDER — POTASSIUM CHLORIDE 750 MG/1
10 TABLET, EXTENDED RELEASE ORAL ONCE
Status: COMPLETED | OUTPATIENT
Start: 2024-01-01 | End: 2024-01-01

## 2024-01-01 RX ORDER — METOPROLOL TARTRATE 1 MG/ML
5 INJECTION, SOLUTION INTRAVENOUS EVERY 5 MIN PRN
Status: DISCONTINUED | OUTPATIENT
Start: 2024-01-01 | End: 2024-01-01 | Stop reason: HOSPADM

## 2024-01-01 RX ORDER — MAGNESIUM OXIDE 400 MG/1
400 TABLET ORAL EVERY 4 HOURS
Status: DISCONTINUED | OUTPATIENT
Start: 2024-01-01 | End: 2024-01-01

## 2024-01-01 RX ORDER — MIDODRINE HYDROCHLORIDE 5 MG/1
10 TABLET ORAL
Status: COMPLETED | OUTPATIENT
Start: 2024-01-01 | End: 2024-01-01

## 2024-01-01 RX ORDER — CALCIUM GLUCONATE 20 MG/ML
1 INJECTION, SOLUTION INTRAVENOUS ONCE
Status: COMPLETED | OUTPATIENT
Start: 2024-01-01 | End: 2024-01-01

## 2024-01-01 RX ORDER — CALCIUM CARBONATE 500 MG/1
1000 TABLET, CHEWABLE ORAL 4 TIMES DAILY PRN
Status: DISCONTINUED | OUTPATIENT
Start: 2024-01-01 | End: 2024-01-01 | Stop reason: HOSPADM

## 2024-01-01 RX ORDER — ONDANSETRON 2 MG/ML
4 INJECTION INTRAMUSCULAR; INTRAVENOUS EVERY 6 HOURS PRN
Status: DISCONTINUED | OUTPATIENT
Start: 2024-01-01 | End: 2024-01-01 | Stop reason: HOSPADM

## 2024-01-01 RX ORDER — ALBUMIN (HUMAN) 12.5 G/50ML
50 SOLUTION INTRAVENOUS
Status: DISCONTINUED | OUTPATIENT
Start: 2024-01-01 | End: 2024-01-01

## 2024-01-01 RX ORDER — ASPIRIN 81 MG/1
81 TABLET ORAL 2 TIMES DAILY
Status: DISCONTINUED | OUTPATIENT
Start: 2024-01-01 | End: 2024-01-01 | Stop reason: HOSPADM

## 2024-01-01 RX ORDER — WARFARIN SODIUM 2.5 MG/1
2.5 TABLET ORAL
Status: COMPLETED | OUTPATIENT
Start: 2024-01-01 | End: 2024-01-01

## 2024-01-01 RX ORDER — ACETAMINOPHEN 500 MG
1000 TABLET ORAL EVERY 6 HOURS PRN
COMMUNITY
End: 2024-01-01

## 2024-01-01 RX ORDER — ATORVASTATIN CALCIUM 10 MG/1
10 TABLET, FILM COATED ORAL AT BEDTIME
Status: DISCONTINUED | OUTPATIENT
Start: 2024-01-01 | End: 2024-01-01 | Stop reason: HOSPADM

## 2024-01-01 RX ORDER — OXYBUTYNIN CHLORIDE 5 MG/1
5 TABLET ORAL EVERY MORNING
Status: DISCONTINUED | OUTPATIENT
Start: 2024-01-01 | End: 2024-01-01 | Stop reason: HOSPADM

## 2024-01-01 RX ORDER — PROCHLORPERAZINE 25 MG
12.5 SUPPOSITORY, RECTAL RECTAL EVERY 12 HOURS PRN
Status: DISCONTINUED | OUTPATIENT
Start: 2024-01-01 | End: 2024-01-01 | Stop reason: HOSPADM

## 2024-01-01 RX ORDER — DEXTROSE MONOHYDRATE 25 G/50ML
INJECTION, SOLUTION INTRAVENOUS
Status: COMPLETED
Start: 2024-01-01 | End: 2024-01-01

## 2024-01-01 RX ORDER — LEVOTHYROXINE SODIUM 50 UG/1
50 TABLET ORAL
Status: DISCONTINUED | OUTPATIENT
Start: 2024-01-01 | End: 2024-01-01 | Stop reason: HOSPADM

## 2024-01-01 RX ORDER — LIDOCAINE/PRILOCAINE 2.5 %-2.5%
CREAM (GRAM) TOPICAL DAILY PRN
Status: DISCONTINUED | OUTPATIENT
Start: 2024-01-01 | End: 2024-01-01 | Stop reason: HOSPADM

## 2024-01-01 RX ORDER — CLONIDINE HYDROCHLORIDE 0.1 MG/1
0.1 TABLET ORAL EVERY 4 HOURS PRN
COMMUNITY
End: 2024-01-01

## 2024-01-01 RX ORDER — METHOXY POLYETHYLENE GLYCOL-EPOETIN BETA 30 UG/.3ML
30 INJECTION, SOLUTION INTRAVENOUS
COMMUNITY

## 2024-01-01 RX ORDER — CALCIUM GLUCONATE 94 MG/ML
1 INJECTION, SOLUTION INTRAVENOUS ONCE
Status: COMPLETED | OUTPATIENT
Start: 2024-01-01 | End: 2024-01-01

## 2024-01-01 RX ORDER — UREA 200 MG/G
CREAM TOPICAL DAILY
Status: DISCONTINUED | OUTPATIENT
Start: 2024-01-01 | End: 2024-01-01 | Stop reason: HOSPADM

## 2024-01-01 RX ORDER — PHENOL 1.4 %
10 AEROSOL, SPRAY (ML) MUCOUS MEMBRANE
COMMUNITY

## 2024-01-01 RX ORDER — METOPROLOL SUCCINATE 50 MG/1
50 TABLET, EXTENDED RELEASE ORAL AT BEDTIME
Status: DISCONTINUED | OUTPATIENT
Start: 2024-01-01 | End: 2024-01-01

## 2024-01-01 RX ORDER — LIDOCAINE 40 MG/G
CREAM TOPICAL
Status: ACTIVE | OUTPATIENT
Start: 2024-01-01 | End: 2024-01-01

## 2024-01-01 RX ORDER — OXYBUTYNIN CHLORIDE 5 MG/1
5 TABLET ORAL 3 TIMES DAILY
COMMUNITY
End: 2024-01-01

## 2024-01-01 RX ORDER — CEFEPIME HYDROCHLORIDE 1 G/1
1 INJECTION, POWDER, FOR SOLUTION INTRAMUSCULAR; INTRAVENOUS EVERY 24 HOURS
Status: DISCONTINUED | OUTPATIENT
Start: 2024-01-01 | End: 2024-01-01

## 2024-01-01 RX ORDER — LIDOCAINE 4 G/G
1 PATCH TOPICAL EVERY 24 HOURS
COMMUNITY

## 2024-01-01 RX ORDER — POTASSIUM CHLORIDE 7.45 MG/ML
10 INJECTION INTRAVENOUS ONCE
Status: COMPLETED | OUTPATIENT
Start: 2024-01-01 | End: 2024-01-01

## 2024-01-01 RX ORDER — DEXTROSE MONOHYDRATE 100 MG/ML
INJECTION, SOLUTION INTRAVENOUS CONTINUOUS
Status: DISCONTINUED | OUTPATIENT
Start: 2024-01-01 | End: 2024-01-01

## 2024-01-01 RX ORDER — OXYCODONE HYDROCHLORIDE 5 MG/1
10 TABLET ORAL ONCE
Status: COMPLETED | OUTPATIENT
Start: 2024-01-01 | End: 2024-01-01

## 2024-01-01 RX ORDER — PANTOPRAZOLE SODIUM 40 MG/1
40 TABLET, DELAYED RELEASE ORAL 2 TIMES DAILY
Status: DISCONTINUED | OUTPATIENT
Start: 2024-01-01 | End: 2024-01-01

## 2024-01-01 RX ORDER — MIDODRINE HYDROCHLORIDE 5 MG/1
10 TABLET ORAL
Status: DISCONTINUED | OUTPATIENT
Start: 2024-01-01 | End: 2024-01-01 | Stop reason: HOSPADM

## 2024-01-01 RX ORDER — AMOXICILLIN 250 MG
2 CAPSULE ORAL DAILY
Status: DISCONTINUED | OUTPATIENT
Start: 2024-01-01 | End: 2024-01-01

## 2024-01-01 RX ORDER — LIDOCAINE/PRILOCAINE 2.5 %-2.5%
CREAM (GRAM) TOPICAL DAILY PRN
COMMUNITY

## 2024-01-01 RX ORDER — SALIVA STIMULANT COMB. NO.3
1 SPRAY, NON-AEROSOL (ML) MUCOUS MEMBRANE 4 TIMES DAILY PRN
Status: DISCONTINUED | OUTPATIENT
Start: 2024-01-01 | End: 2024-01-01 | Stop reason: HOSPADM

## 2024-01-01 RX ORDER — ALBUMIN (HUMAN) 12.5 G/50ML
50 SOLUTION INTRAVENOUS
OUTPATIENT
Start: 2024-01-01

## 2024-01-01 RX ORDER — POLYETHYLENE GLYCOL 3350 17 G/17G
17 POWDER, FOR SOLUTION ORAL DAILY
Status: DISCONTINUED | OUTPATIENT
Start: 2024-01-01 | End: 2024-01-01 | Stop reason: HOSPADM

## 2024-01-01 RX ORDER — UREA 200 MG/G
CREAM TOPICAL PRN
COMMUNITY
End: 2024-01-01

## 2024-01-01 RX ORDER — OXYCODONE HYDROCHLORIDE 5 MG/1
10 TABLET ORAL EVERY 4 HOURS PRN
Status: DISCONTINUED | OUTPATIENT
Start: 2024-01-01 | End: 2024-01-01

## 2024-01-01 RX ORDER — OXYCODONE HYDROCHLORIDE 5 MG/1
10 TABLET ORAL EVERY 4 HOURS PRN
Qty: 40 TABLET | Refills: 0 | Status: SHIPPED | OUTPATIENT
Start: 2024-01-01

## 2024-01-01 RX ORDER — ALBUMIN (HUMAN) 12.5 G/50ML
50 SOLUTION INTRAVENOUS
Status: DISCONTINUED | OUTPATIENT
Start: 2024-01-01 | End: 2024-01-01 | Stop reason: HOSPADM

## 2024-01-01 RX ORDER — POLYETHYLENE GLYCOL 3350 17 G/17G
1 POWDER, FOR SOLUTION ORAL DAILY
COMMUNITY

## 2024-01-01 RX ORDER — VANCOMYCIN HYDROCHLORIDE
20 ONCE
Status: COMPLETED | OUTPATIENT
Start: 2024-01-01 | End: 2024-01-01

## 2024-01-01 RX ORDER — FENTANYL CITRATE 50 UG/ML
INJECTION, SOLUTION INTRAMUSCULAR; INTRAVENOUS PRN
Status: COMPLETED | OUTPATIENT
Start: 2024-01-01 | End: 2024-01-01

## 2024-01-01 RX ORDER — ACETAMINOPHEN 325 MG/1
650 TABLET ORAL EVERY 4 HOURS PRN
Status: DISCONTINUED | OUTPATIENT
Start: 2024-01-01 | End: 2024-01-01

## 2024-01-01 RX ORDER — LOPERAMIDE HCL 2 MG
2 CAPSULE ORAL 4 TIMES DAILY PRN
COMMUNITY

## 2024-01-01 RX ORDER — AMOXICILLIN 250 MG
2 CAPSULE ORAL DAILY
COMMUNITY

## 2024-01-01 RX ORDER — DEXTROSE MONOHYDRATE 25 G/50ML
INJECTION, SOLUTION INTRAVENOUS
Status: DISCONTINUED
Start: 2024-01-01 | End: 2024-01-01 | Stop reason: HOSPADM

## 2024-01-01 RX ORDER — ACETAMINOPHEN 500 MG
1000 TABLET ORAL 3 TIMES DAILY
COMMUNITY

## 2024-01-01 RX ORDER — LANOLIN ALCOHOL/MO/W.PET/CERES
3 CREAM (GRAM) TOPICAL AT BEDTIME
COMMUNITY

## 2024-01-01 RX ORDER — ACETAMINOPHEN 325 MG/10.15ML
975 LIQUID ORAL 3 TIMES DAILY
Status: DISCONTINUED | OUTPATIENT
Start: 2024-01-01 | End: 2024-01-01 | Stop reason: HOSPADM

## 2024-01-01 RX ORDER — AMOXICILLIN 250 MG
2 CAPSULE ORAL 2 TIMES DAILY PRN
Status: DISCONTINUED | OUTPATIENT
Start: 2024-01-01 | End: 2024-01-01 | Stop reason: HOSPADM

## 2024-01-01 RX ORDER — POTASSIUM CHLORIDE 29.8 MG/ML
20 INJECTION INTRAVENOUS ONCE
Status: COMPLETED | OUTPATIENT
Start: 2024-01-01 | End: 2024-01-01

## 2024-01-01 RX ORDER — CEFEPIME HYDROCHLORIDE 2 G/1
2 INJECTION, POWDER, FOR SOLUTION INTRAVENOUS EVERY 24 HOURS
Status: DISCONTINUED | OUTPATIENT
Start: 2024-01-01 | End: 2024-01-01 | Stop reason: HOSPADM

## 2024-01-01 RX ORDER — POTASSIUM CHLORIDE 29.8 MG/ML
20 INJECTION INTRAVENOUS
Status: COMPLETED | OUTPATIENT
Start: 2024-01-01 | End: 2024-01-01

## 2024-01-01 RX ORDER — OXYBUTYNIN CHLORIDE 5 MG/1
5 TABLET ORAL EVERY MORNING
COMMUNITY

## 2024-01-01 RX ORDER — DIPHENOXYLATE HCL/ATROPINE 2.5-.025MG
1 TABLET ORAL EVERY 8 HOURS PRN
Qty: 30 TABLET | Refills: 0 | Status: SHIPPED | OUTPATIENT
Start: 2024-01-01

## 2024-01-01 RX ORDER — PANTOPRAZOLE SODIUM 40 MG/1
40 TABLET, DELAYED RELEASE ORAL
Status: DISCONTINUED | OUTPATIENT
Start: 2024-01-01 | End: 2024-01-01 | Stop reason: HOSPADM

## 2024-01-01 RX ORDER — LIDOCAINE 40 MG/G
CREAM TOPICAL
Status: DISCONTINUED | OUTPATIENT
Start: 2024-01-01 | End: 2024-01-01 | Stop reason: HOSPADM

## 2024-01-01 RX ORDER — ONDANSETRON 4 MG/1
4 TABLET, ORALLY DISINTEGRATING ORAL EVERY 6 HOURS PRN
Status: DISCONTINUED | OUTPATIENT
Start: 2024-01-01 | End: 2024-01-01 | Stop reason: HOSPADM

## 2024-01-01 RX ORDER — ASPIRIN 81 MG/1
81 TABLET ORAL 2 TIMES DAILY
COMMUNITY

## 2024-01-01 RX ORDER — ACETAMINOPHEN 650 MG/1
650 SUPPOSITORY RECTAL EVERY 4 HOURS PRN
Status: DISCONTINUED | OUTPATIENT
Start: 2024-01-01 | End: 2024-01-01

## 2024-01-01 RX ORDER — ROPIVACAINE IN 0.9% SOD CHL/PF 0.1 %
.01-.125 PLASTIC BAG, INJECTION (ML) EPIDURAL CONTINUOUS
Status: DISCONTINUED | OUTPATIENT
Start: 2024-01-01 | End: 2024-01-01

## 2024-01-01 RX ORDER — AMIODARONE HYDROCHLORIDE 200 MG/1
200 TABLET ORAL DAILY
Status: DISCONTINUED | OUTPATIENT
Start: 2024-01-01 | End: 2024-01-01 | Stop reason: HOSPADM

## 2024-01-01 RX ADMIN — POTASSIUM CHLORIDE 10 MEQ: 20 SOLUTION ORAL at 18:36

## 2024-01-01 RX ADMIN — METOPROLOL TARTRATE 25 MG: 25 TABLET, FILM COATED ORAL at 08:34

## 2024-01-01 RX ADMIN — PERFLUTREN 2 ML: 6.52 INJECTION, SUSPENSION INTRAVENOUS at 09:37

## 2024-01-01 RX ADMIN — HYDROCORTISONE SODIUM SUCCINATE 50 MG: 100 INJECTION, POWDER, FOR SOLUTION INTRAMUSCULAR; INTRAVENOUS at 16:50

## 2024-01-01 RX ADMIN — NOREPINEPHRINE BITARTRATE 0.03 MCG/KG/MIN: 0.02 INJECTION, SOLUTION INTRAVENOUS at 02:09

## 2024-01-01 RX ADMIN — HYDROCORTISONE SODIUM SUCCINATE 50 MG: 100 INJECTION, POWDER, FOR SOLUTION INTRAMUSCULAR; INTRAVENOUS at 05:13

## 2024-01-01 RX ADMIN — ACETAMINOPHEN 1000 MG: 325 SOLUTION ORAL at 21:40

## 2024-01-01 RX ADMIN — MIDODRINE HYDROCHLORIDE 10 MG: 5 TABLET ORAL at 03:11

## 2024-01-01 RX ADMIN — MIDODRINE HYDROCHLORIDE 10 MG: 5 TABLET ORAL at 08:18

## 2024-01-01 RX ADMIN — HYDROCORTISONE SODIUM SUCCINATE 25 MG: 100 INJECTION, POWDER, FOR SOLUTION INTRAMUSCULAR; INTRAVENOUS at 16:56

## 2024-01-01 RX ADMIN — ACETAMINOPHEN 1000 MG: 325 SOLUTION ORAL at 11:32

## 2024-01-01 RX ADMIN — PANTOPRAZOLE SODIUM 40 MG: 40 TABLET, DELAYED RELEASE ORAL at 08:35

## 2024-01-01 RX ADMIN — ALBUMIN HUMAN 250 ML: 0.05 INJECTION, SOLUTION INTRAVENOUS at 15:22

## 2024-01-01 RX ADMIN — ACETAMINOPHEN 975 MG: 650 SOLUTION ORAL at 21:07

## 2024-01-01 RX ADMIN — ATORVASTATIN CALCIUM 10 MG: 10 TABLET, FILM COATED ORAL at 22:13

## 2024-01-01 RX ADMIN — Medication 400 MG: at 08:37

## 2024-01-01 RX ADMIN — ASPIRIN 81 MG: 81 TABLET, COATED ORAL at 08:22

## 2024-01-01 RX ADMIN — METOPROLOL SUCCINATE 50 MG: 25 TABLET, EXTENDED RELEASE ORAL at 18:24

## 2024-01-01 RX ADMIN — SODIUM CHLORIDE 250 ML: 9 INJECTION, SOLUTION INTRAVENOUS at 13:03

## 2024-01-01 RX ADMIN — PANTOPRAZOLE SODIUM 40 MG: 40 TABLET, DELAYED RELEASE ORAL at 16:56

## 2024-01-01 RX ADMIN — Medication: at 09:31

## 2024-01-01 RX ADMIN — SODIUM CHLORIDE, POTASSIUM CHLORIDE, SODIUM LACTATE AND CALCIUM CHLORIDE 1000 ML: 600; 310; 30; 20 INJECTION, SOLUTION INTRAVENOUS at 12:39

## 2024-01-01 RX ADMIN — ASPIRIN 81 MG: 81 TABLET, COATED ORAL at 19:42

## 2024-01-01 RX ADMIN — AMIODARONE HYDROCHLORIDE 0.5 MG/MIN: 1.8 INJECTION, SOLUTION INTRAVENOUS at 06:32

## 2024-01-01 RX ADMIN — METOPROLOL TARTRATE 25 MG: 25 TABLET, FILM COATED ORAL at 21:40

## 2024-01-01 RX ADMIN — HEPARIN SODIUM 2200 UNITS: 1000 INJECTION INTRAVENOUS; SUBCUTANEOUS at 14:04

## 2024-01-01 RX ADMIN — PANTOPRAZOLE SODIUM 40 MG: 40 TABLET, DELAYED RELEASE ORAL at 15:31

## 2024-01-01 RX ADMIN — LEVOTHYROXINE SODIUM 50 MCG: 0.05 TABLET ORAL at 08:37

## 2024-01-01 RX ADMIN — ATORVASTATIN CALCIUM 10 MG: 10 TABLET, FILM COATED ORAL at 20:17

## 2024-01-01 RX ADMIN — ACETAMINOPHEN 975 MG: 650 SOLUTION ORAL at 18:17

## 2024-01-01 RX ADMIN — PHYTONADIONE 2.5 MG: 10 INJECTION, EMULSION INTRAMUSCULAR; INTRAVENOUS; SUBCUTANEOUS at 13:11

## 2024-01-01 RX ADMIN — HYDROCORTISONE SODIUM SUCCINATE 50 MG: 100 INJECTION, POWDER, FOR SOLUTION INTRAMUSCULAR; INTRAVENOUS at 21:40

## 2024-01-01 RX ADMIN — COLLAGENASE SANTYL: 250 OINTMENT TOPICAL at 14:43

## 2024-01-01 RX ADMIN — ACETAMINOPHEN 975 MG: 650 SOLUTION ORAL at 17:17

## 2024-01-01 RX ADMIN — LEVOTHYROXINE SODIUM 50 MCG: 0.05 TABLET ORAL at 09:28

## 2024-01-01 RX ADMIN — FENTANYL CITRATE 12.5 MCG: 50 INJECTION, SOLUTION INTRAMUSCULAR; INTRAVENOUS at 09:08

## 2024-01-01 RX ADMIN — ASPIRIN 81 MG: 81 TABLET, COATED ORAL at 21:16

## 2024-01-01 RX ADMIN — COLLAGENASE SANTYL: 250 OINTMENT TOPICAL at 08:51

## 2024-01-01 RX ADMIN — ATORVASTATIN CALCIUM 10 MG: 10 TABLET, FILM COATED ORAL at 21:04

## 2024-01-01 RX ADMIN — METOPROLOL TARTRATE 5 MG: 1 INJECTION, SOLUTION INTRAVENOUS at 14:24

## 2024-01-01 RX ADMIN — CEFEPIME 2 G: 2 INJECTION, POWDER, FOR SOLUTION INTRAVENOUS at 16:51

## 2024-01-01 RX ADMIN — CEFEPIME 2 G: 2 INJECTION, POWDER, FOR SOLUTION INTRAVENOUS at 15:05

## 2024-01-01 RX ADMIN — OXYBUTYNIN CHLORIDE 5 MG: 5 TABLET ORAL at 08:34

## 2024-01-01 RX ADMIN — NOREPINEPHRINE BITARTRATE 0.2 MCG/KG/MIN: 1 SOLUTION INTRAVENOUS at 21:02

## 2024-01-01 RX ADMIN — MAGNESIUM SULFATE HEPTAHYDRATE 2 G: 40 INJECTION, SOLUTION INTRAVENOUS at 11:42

## 2024-01-01 RX ADMIN — METOPROLOL TARTRATE 25 MG: 25 TABLET, FILM COATED ORAL at 20:24

## 2024-01-01 RX ADMIN — HYDROCORTISONE SODIUM SUCCINATE 50 MG: 100 INJECTION, POWDER, FOR SOLUTION INTRAMUSCULAR; INTRAVENOUS at 16:51

## 2024-01-01 RX ADMIN — METOPROLOL TARTRATE 25 MG: 25 TABLET, FILM COATED ORAL at 13:18

## 2024-01-01 RX ADMIN — ATORVASTATIN CALCIUM 10 MG: 10 TABLET, FILM COATED ORAL at 21:16

## 2024-01-01 RX ADMIN — ATORVASTATIN CALCIUM 10 MG: 10 TABLET, FILM COATED ORAL at 21:40

## 2024-01-01 RX ADMIN — COLLAGENASE SANTYL: 250 OINTMENT TOPICAL at 08:46

## 2024-01-01 RX ADMIN — Medication 60 ML: at 09:31

## 2024-01-01 RX ADMIN — WARFARIN SODIUM 2.5 MG: 2.5 TABLET ORAL at 19:31

## 2024-01-01 RX ADMIN — OXYBUTYNIN CHLORIDE 5 MG: 5 TABLET ORAL at 09:28

## 2024-01-01 RX ADMIN — ACETAMINOPHEN 1000 MG: 325 SOLUTION ORAL at 15:27

## 2024-01-01 RX ADMIN — SODIUM CHLORIDE 300 ML: 9 INJECTION, SOLUTION INTRAVENOUS at 15:52

## 2024-01-01 RX ADMIN — MIDODRINE HYDROCHLORIDE 10 MG: 5 TABLET ORAL at 08:30

## 2024-01-01 RX ADMIN — PANTOPRAZOLE SODIUM 40 MG: 40 INJECTION, POWDER, FOR SOLUTION INTRAVENOUS at 20:18

## 2024-01-01 RX ADMIN — DEXTROSE MONOHYDRATE 50 ML: 25 INJECTION, SOLUTION INTRAVENOUS at 13:33

## 2024-01-01 RX ADMIN — ASPIRIN 81 MG: 81 TABLET, COATED ORAL at 20:17

## 2024-01-01 RX ADMIN — WARFARIN SODIUM 2.5 MG: 2.5 TABLET ORAL at 17:55

## 2024-01-01 RX ADMIN — AMIODARONE HYDROCHLORIDE 200 MG: 200 TABLET ORAL at 08:34

## 2024-01-01 RX ADMIN — HYDROCORTISONE SODIUM SUCCINATE 25 MG: 100 INJECTION, POWDER, FOR SOLUTION INTRAMUSCULAR; INTRAVENOUS at 10:32

## 2024-01-01 RX ADMIN — SODIUM CHLORIDE 300 ML: 9 INJECTION, SOLUTION INTRAVENOUS at 14:02

## 2024-01-01 RX ADMIN — DEXTROSE MONOHYDRATE 50 ML: 25 INJECTION, SOLUTION INTRAVENOUS at 12:54

## 2024-01-01 RX ADMIN — MIDODRINE HYDROCHLORIDE 10 MG: 5 TABLET ORAL at 13:18

## 2024-01-01 RX ADMIN — DEXTROSE MONOHYDRATE: 100 INJECTION, SOLUTION INTRAVENOUS at 10:14

## 2024-01-01 RX ADMIN — HYDROCORTISONE SODIUM SUCCINATE 25 MG: 100 INJECTION, POWDER, FOR SOLUTION INTRAMUSCULAR; INTRAVENOUS at 17:09

## 2024-01-01 RX ADMIN — POTASSIUM CHLORIDE 10 MEQ: 7.46 INJECTION, SOLUTION INTRAVENOUS at 04:30

## 2024-01-01 RX ADMIN — NOREPINEPHRINE BITARTRATE 0.06 MCG/KG/MIN: 1 SOLUTION INTRAVENOUS at 23:43

## 2024-01-01 RX ADMIN — METOPROLOL TARTRATE 5 MG: 1 INJECTION, SOLUTION INTRAVENOUS at 17:14

## 2024-01-01 RX ADMIN — AMIODARONE HYDROCHLORIDE 0.5 MG/MIN: 1.8 INJECTION, SOLUTION INTRAVENOUS at 18:24

## 2024-01-01 RX ADMIN — LEVOTHYROXINE SODIUM 50 MCG: 0.05 TABLET ORAL at 08:18

## 2024-01-01 RX ADMIN — Medication 60 ML: at 08:37

## 2024-01-01 RX ADMIN — HYDROCORTISONE SODIUM SUCCINATE 25 MG: 100 INJECTION, POWDER, FOR SOLUTION INTRAMUSCULAR; INTRAVENOUS at 09:22

## 2024-01-01 RX ADMIN — AMIODARONE HYDROCHLORIDE 1 MG/MIN: 1.8 INJECTION, SOLUTION INTRAVENOUS at 09:22

## 2024-01-01 RX ADMIN — METOPROLOL TARTRATE 5 MG: 1 INJECTION, SOLUTION INTRAVENOUS at 18:37

## 2024-01-01 RX ADMIN — Medication 400 MG: at 11:32

## 2024-01-01 RX ADMIN — VANCOMYCIN HYDROCHLORIDE 1500 MG: 5 INJECTION, POWDER, LYOPHILIZED, FOR SOLUTION INTRAVENOUS at 18:03

## 2024-01-01 RX ADMIN — ASPIRIN 81 MG: 81 TABLET, COATED ORAL at 09:23

## 2024-01-01 RX ADMIN — METOPROLOL TARTRATE 25 MG: 25 TABLET, FILM COATED ORAL at 08:37

## 2024-01-01 RX ADMIN — HYDROCORTISONE SODIUM SUCCINATE 50 MG: 100 INJECTION, POWDER, FOR SOLUTION INTRAMUSCULAR; INTRAVENOUS at 11:35

## 2024-01-01 RX ADMIN — OXYCODONE HYDROCHLORIDE 5 MG: 5 TABLET ORAL at 00:59

## 2024-01-01 RX ADMIN — PANTOPRAZOLE SODIUM 40 MG: 40 TABLET, DELAYED RELEASE ORAL at 17:12

## 2024-01-01 RX ADMIN — POTASSIUM CHLORIDE 10 MEQ: 20 SOLUTION ORAL at 08:37

## 2024-01-01 RX ADMIN — OXYBUTYNIN CHLORIDE 5 MG: 5 TABLET ORAL at 09:23

## 2024-01-01 RX ADMIN — ALBUMIN HUMAN 25 G: 0.05 INJECTION, SOLUTION INTRAVENOUS at 04:48

## 2024-01-01 RX ADMIN — ATORVASTATIN CALCIUM 10 MG: 10 TABLET, FILM COATED ORAL at 22:33

## 2024-01-01 RX ADMIN — POTASSIUM CHLORIDE 20 MEQ: 29.8 INJECTION, SOLUTION INTRAVENOUS at 05:57

## 2024-01-01 RX ADMIN — FENTANYL CITRATE 12.5 MCG: 50 INJECTION INTRAMUSCULAR; INTRAVENOUS at 09:51

## 2024-01-01 RX ADMIN — HYDROCORTISONE SODIUM SUCCINATE 50 MG: 100 INJECTION, POWDER, FOR SOLUTION INTRAMUSCULAR; INTRAVENOUS at 19:52

## 2024-01-01 RX ADMIN — Medication: at 16:48

## 2024-01-01 RX ADMIN — POLYETHYLENE GLYCOL 3350 17 G: 17 POWDER, FOR SOLUTION ORAL at 08:38

## 2024-01-01 RX ADMIN — METHOCARBAMOL TABLETS 750 MG: 750 TABLET, COATED ORAL at 19:42

## 2024-01-01 RX ADMIN — POTASSIUM CHLORIDE 20 MEQ: 29.8 INJECTION, SOLUTION INTRAVENOUS at 06:54

## 2024-01-01 RX ADMIN — METOPROLOL TARTRATE 5 MG: 1 INJECTION, SOLUTION INTRAVENOUS at 13:11

## 2024-01-01 RX ADMIN — PANTOPRAZOLE SODIUM 40 MG: 40 INJECTION, POWDER, FOR SOLUTION INTRAVENOUS at 20:47

## 2024-01-01 RX ADMIN — Medication 60 ML: at 11:27

## 2024-01-01 RX ADMIN — ASPIRIN 81 MG: 81 TABLET, COATED ORAL at 21:41

## 2024-01-01 RX ADMIN — Medication: at 08:46

## 2024-01-01 RX ADMIN — VANCOMYCIN HYDROCHLORIDE 1500 MG: 5 INJECTION, POWDER, LYOPHILIZED, FOR SOLUTION INTRAVENOUS at 18:41

## 2024-01-01 RX ADMIN — MIDODRINE HYDROCHLORIDE 10 MG: 5 TABLET ORAL at 00:12

## 2024-01-01 RX ADMIN — LEVOTHYROXINE SODIUM 50 MCG: 0.05 TABLET ORAL at 06:32

## 2024-01-01 RX ADMIN — MIDODRINE HYDROCHLORIDE 10 MG: 5 TABLET ORAL at 12:13

## 2024-01-01 RX ADMIN — FENTANYL CITRATE 12.5 MCG: 50 INJECTION INTRAMUSCULAR; INTRAVENOUS at 09:43

## 2024-01-01 RX ADMIN — ACETAMINOPHEN 975 MG: 650 SOLUTION ORAL at 08:36

## 2024-01-01 RX ADMIN — POTASSIUM CHLORIDE 40 MEQ: 20 SOLUTION ORAL at 00:12

## 2024-01-01 RX ADMIN — ACETAMINOPHEN 1000 MG: 500 TABLET ORAL at 13:57

## 2024-01-01 RX ADMIN — AMIODARONE HYDROCHLORIDE 200 MG: 200 TABLET ORAL at 10:48

## 2024-01-01 RX ADMIN — AMIODARONE HYDROCHLORIDE 0.5 MG/MIN: 1.8 INJECTION, SOLUTION INTRAVENOUS at 11:46

## 2024-01-01 RX ADMIN — Medication 4 MCG/KG/MIN: at 12:15

## 2024-01-01 RX ADMIN — CEFEPIME 2 G: 2 INJECTION, POWDER, FOR SOLUTION INTRAVENOUS at 13:04

## 2024-01-01 RX ADMIN — PHYTONADIONE 2.5 MG: 10 INJECTION, EMULSION INTRAMUSCULAR; INTRAVENOUS; SUBCUTANEOUS at 22:48

## 2024-01-01 RX ADMIN — OXYBUTYNIN CHLORIDE 5 MG: 5 TABLET ORAL at 08:21

## 2024-01-01 RX ADMIN — ASPIRIN 81 MG: 81 TABLET, COATED ORAL at 08:37

## 2024-01-01 RX ADMIN — HYDROCORTISONE SODIUM SUCCINATE 25 MG: 100 INJECTION, POWDER, FOR SOLUTION INTRAMUSCULAR; INTRAVENOUS at 00:45

## 2024-01-01 RX ADMIN — Medication: at 09:33

## 2024-01-01 RX ADMIN — AMIODARONE HYDROCHLORIDE 150 MG: 1.5 INJECTION, SOLUTION INTRAVENOUS at 21:03

## 2024-01-01 RX ADMIN — ALTEPLASE 2 MG: KIT at 17:25

## 2024-01-01 RX ADMIN — PANTOPRAZOLE SODIUM 40 MG: 40 INJECTION, POWDER, FOR SOLUTION INTRAVENOUS at 08:17

## 2024-01-01 RX ADMIN — ALBUMIN HUMAN 50 ML: 0.25 SOLUTION INTRAVENOUS at 16:18

## 2024-01-01 RX ADMIN — MIDODRINE HYDROCHLORIDE 10 MG: 5 TABLET ORAL at 18:17

## 2024-01-01 RX ADMIN — VANCOMYCIN HYDROCHLORIDE 1500 MG: 5 INJECTION, POWDER, LYOPHILIZED, FOR SOLUTION INTRAVENOUS at 15:45

## 2024-01-01 RX ADMIN — MIDODRINE HYDROCHLORIDE 10 MG: 5 TABLET ORAL at 11:42

## 2024-01-01 RX ADMIN — MIDODRINE HYDROCHLORIDE 10 MG: 5 TABLET ORAL at 11:31

## 2024-01-01 RX ADMIN — MIDODRINE HYDROCHLORIDE 10 MG: 5 TABLET ORAL at 13:11

## 2024-01-01 RX ADMIN — MIDODRINE HYDROCHLORIDE 10 MG: 5 TABLET ORAL at 16:51

## 2024-01-01 RX ADMIN — ASPIRIN 81 MG: 81 TABLET, COATED ORAL at 20:47

## 2024-01-01 RX ADMIN — ACETAMINOPHEN 975 MG: 650 SOLUTION ORAL at 15:01

## 2024-01-01 RX ADMIN — Medication 60 ML: at 09:09

## 2024-01-01 RX ADMIN — HEPARIN SODIUM 2300 UNITS: 1000 INJECTION INTRAVENOUS; SUBCUTANEOUS at 14:04

## 2024-01-01 RX ADMIN — OXYBUTYNIN CHLORIDE 5 MG: 5 TABLET ORAL at 08:17

## 2024-01-01 RX ADMIN — AMIODARONE HYDROCHLORIDE 1 MG/MIN: 1.8 INJECTION, SOLUTION INTRAVENOUS at 21:33

## 2024-01-01 RX ADMIN — METOPROLOL TARTRATE 5 MG: 1 INJECTION, SOLUTION INTRAVENOUS at 04:17

## 2024-01-01 RX ADMIN — OXYBUTYNIN CHLORIDE 5 MG: 5 TABLET ORAL at 08:37

## 2024-01-01 RX ADMIN — MIDODRINE HYDROCHLORIDE 10 MG: 5 TABLET ORAL at 08:37

## 2024-01-01 RX ADMIN — PANTOPRAZOLE SODIUM 40 MG: 40 TABLET, DELAYED RELEASE ORAL at 06:32

## 2024-01-01 RX ADMIN — OXYCODONE HYDROCHLORIDE 10 MG: 5 TABLET ORAL at 15:45

## 2024-01-01 RX ADMIN — CEFEPIME 2 G: 2 INJECTION, POWDER, FOR SOLUTION INTRAVENOUS at 18:09

## 2024-01-01 RX ADMIN — ALBUMIN HUMAN 50 G: 0.25 SOLUTION INTRAVENOUS at 10:07

## 2024-01-01 RX ADMIN — HYDROCORTISONE SODIUM SUCCINATE 50 MG: 100 INJECTION, POWDER, FOR SOLUTION INTRAMUSCULAR; INTRAVENOUS at 05:31

## 2024-01-01 RX ADMIN — SODIUM CHLORIDE 250 ML: 9 INJECTION, SOLUTION INTRAVENOUS at 14:01

## 2024-01-01 RX ADMIN — CEFEPIME 1 G: 1 INJECTION, POWDER, FOR SOLUTION INTRAMUSCULAR; INTRAVENOUS at 18:12

## 2024-01-01 RX ADMIN — HYDROCORTISONE SODIUM SUCCINATE 50 MG: 100 INJECTION, POWDER, FOR SOLUTION INTRAMUSCULAR; INTRAVENOUS at 22:33

## 2024-01-01 RX ADMIN — HYDROCORTISONE SODIUM SUCCINATE 25 MG: 100 INJECTION, POWDER, FOR SOLUTION INTRAMUSCULAR; INTRAVENOUS at 08:44

## 2024-01-01 RX ADMIN — ALBUMIN HUMAN 50 ML: 0.25 SOLUTION INTRAVENOUS at 13:34

## 2024-01-01 RX ADMIN — PANTOPRAZOLE SODIUM 40 MG: 40 TABLET, DELAYED RELEASE ORAL at 19:42

## 2024-01-01 RX ADMIN — MIDODRINE HYDROCHLORIDE 10 MG: 5 TABLET ORAL at 08:34

## 2024-01-01 RX ADMIN — Medication 60 ML: at 13:18

## 2024-01-01 RX ADMIN — ACETAMINOPHEN 1000 MG: 325 SOLUTION ORAL at 08:23

## 2024-01-01 RX ADMIN — ACETAMINOPHEN 975 MG: 650 SOLUTION ORAL at 21:17

## 2024-01-01 RX ADMIN — ACETAMINOPHEN 1000 MG: 500 TABLET ORAL at 20:17

## 2024-01-01 RX ADMIN — FENTANYL CITRATE 12.5 MCG: 50 INJECTION INTRAMUSCULAR; INTRAVENOUS at 09:34

## 2024-01-01 RX ADMIN — POTASSIUM CHLORIDE 20 MEQ: 29.8 INJECTION, SOLUTION INTRAVENOUS at 08:43

## 2024-01-01 RX ADMIN — DEXTROSE MONOHYDRATE: 100 INJECTION, SOLUTION INTRAVENOUS at 13:12

## 2024-01-01 RX ADMIN — ASPIRIN 81 MG: 81 TABLET, COATED ORAL at 08:18

## 2024-01-01 RX ADMIN — Medication: at 08:51

## 2024-01-01 RX ADMIN — MIDODRINE HYDROCHLORIDE 10 MG: 5 TABLET ORAL at 17:55

## 2024-01-01 RX ADMIN — LEVOTHYROXINE SODIUM 50 MCG: 0.05 TABLET ORAL at 09:23

## 2024-01-01 RX ADMIN — ASPIRIN 81 MG: 81 TABLET, COATED ORAL at 08:34

## 2024-01-01 RX ADMIN — COLLAGENASE SANTYL: 250 OINTMENT TOPICAL at 16:48

## 2024-01-01 RX ADMIN — HYDROCORTISONE SODIUM SUCCINATE 25 MG: 100 INJECTION, POWDER, FOR SOLUTION INTRAMUSCULAR; INTRAVENOUS at 00:36

## 2024-01-01 RX ADMIN — ALBUMIN HUMAN 50 ML: 0.25 SOLUTION INTRAVENOUS at 11:51

## 2024-01-01 RX ADMIN — PANTOPRAZOLE SODIUM 40 MG: 40 TABLET, DELAYED RELEASE ORAL at 18:17

## 2024-01-01 RX ADMIN — DEXTROSE MONOHYDRATE: 100 INJECTION, SOLUTION INTRAVENOUS at 20:18

## 2024-01-01 RX ADMIN — ACETAMINOPHEN 1000 MG: 325 SOLUTION ORAL at 08:15

## 2024-01-01 RX ADMIN — ALBUMIN (HUMAN) 50 G: 12.5 SOLUTION INTRAVENOUS at 10:07

## 2024-01-01 RX ADMIN — MIDODRINE HYDROCHLORIDE 10 MG: 5 TABLET ORAL at 13:04

## 2024-01-01 RX ADMIN — PANTOPRAZOLE SODIUM 40 MG: 40 TABLET, DELAYED RELEASE ORAL at 09:28

## 2024-01-01 RX ADMIN — ASPIRIN 81 MG: 81 TABLET, COATED ORAL at 21:04

## 2024-01-01 RX ADMIN — PANTOPRAZOLE SODIUM 40 MG: 40 TABLET, DELAYED RELEASE ORAL at 08:23

## 2024-01-01 RX ADMIN — MIDODRINE HYDROCHLORIDE 10 MG: 5 TABLET ORAL at 16:50

## 2024-01-01 RX ADMIN — METOPROLOL TARTRATE 5 MG: 1 INJECTION, SOLUTION INTRAVENOUS at 05:03

## 2024-01-01 RX ADMIN — ACETAMINOPHEN 975 MG: 650 SOLUTION ORAL at 09:23

## 2024-01-01 RX ADMIN — MAGNESIUM SULFATE HEPTAHYDRATE 2 G: 40 INJECTION, SOLUTION INTRAVENOUS at 05:57

## 2024-01-01 RX ADMIN — COLLAGENASE SANTYL: 250 OINTMENT TOPICAL at 15:01

## 2024-01-01 RX ADMIN — ACETAMINOPHEN 1000 MG: 325 SOLUTION ORAL at 20:48

## 2024-01-01 RX ADMIN — ACETAMINOPHEN 1000 MG: 325 SOLUTION ORAL at 14:58

## 2024-01-01 RX ADMIN — ATORVASTATIN CALCIUM 10 MG: 10 TABLET, FILM COATED ORAL at 21:07

## 2024-01-01 RX ADMIN — MIDODRINE HYDROCHLORIDE 10 MG: 5 TABLET ORAL at 16:55

## 2024-01-01 RX ADMIN — ALBUMIN HUMAN 50 ML: 0.25 SOLUTION INTRAVENOUS at 16:36

## 2024-01-01 RX ADMIN — SODIUM CHLORIDE 250 ML: 9 INJECTION, SOLUTION INTRAVENOUS at 15:52

## 2024-01-01 RX ADMIN — AMIODARONE HYDROCHLORIDE 150 MG: 1.5 INJECTION, SOLUTION INTRAVENOUS at 08:48

## 2024-01-01 RX ADMIN — HYDROCORTISONE SODIUM SUCCINATE 50 MG: 100 INJECTION, POWDER, FOR SOLUTION INTRAMUSCULAR; INTRAVENOUS at 13:33

## 2024-01-01 RX ADMIN — LEVOTHYROXINE SODIUM 50 MCG: 0.05 TABLET ORAL at 08:22

## 2024-01-01 RX ADMIN — CALCIUM GLUCONATE 1 G: 20 INJECTION, SOLUTION INTRAVENOUS at 02:16

## 2024-01-01 RX ADMIN — Medication 1 EACH: at 16:09

## 2024-01-01 RX ADMIN — Medication: at 09:42

## 2024-01-01 RX ADMIN — ACETAMINOPHEN 1000 MG: 500 TABLET ORAL at 19:42

## 2024-01-01 RX ADMIN — MIDODRINE HYDROCHLORIDE 10 MG: 5 TABLET ORAL at 09:28

## 2024-01-01 RX ADMIN — METOPROLOL TARTRATE 25 MG: 25 TABLET, FILM COATED ORAL at 21:16

## 2024-01-01 RX ADMIN — POTASSIUM CHLORIDE 20 MEQ: 20 SOLUTION ORAL at 11:42

## 2024-01-01 RX ADMIN — ALBUMIN HUMAN 25 G: 0.05 INJECTION, SOLUTION INTRAVENOUS at 23:32

## 2024-01-01 RX ADMIN — PANTOPRAZOLE SODIUM 40 MG: 40 TABLET, DELAYED RELEASE ORAL at 08:37

## 2024-01-01 RX ADMIN — ALBUMIN HUMAN 50 ML: 0.25 SOLUTION INTRAVENOUS at 15:56

## 2024-01-01 RX ADMIN — MIDODRINE HYDROCHLORIDE 10 MG: 5 TABLET ORAL at 08:22

## 2024-01-01 RX ADMIN — POLYETHYLENE GLYCOL 3350 17 G: 17 POWDER, FOR SOLUTION ORAL at 08:19

## 2024-01-01 RX ADMIN — COLLAGENASE SANTYL: 250 OINTMENT TOPICAL at 20:15

## 2024-01-01 RX ADMIN — ALBUMIN HUMAN 250 ML: 0.05 INJECTION, SOLUTION INTRAVENOUS at 11:37

## 2024-01-01 RX ADMIN — Medication 60 ML: at 11:44

## 2024-01-01 RX ADMIN — MAGNESIUM SULFATE HEPTAHYDRATE 1 G: 500 INJECTION, SOLUTION INTRAMUSCULAR; INTRAVENOUS at 03:26

## 2024-01-01 RX ADMIN — AMIODARONE HYDROCHLORIDE 0.5 MG/MIN: 1.8 INJECTION, SOLUTION INTRAVENOUS at 11:26

## 2024-01-01 RX ADMIN — HYDROCORTISONE SODIUM SUCCINATE 50 MG: 100 INJECTION, POWDER, FOR SOLUTION INTRAMUSCULAR; INTRAVENOUS at 00:59

## 2024-01-01 RX ADMIN — SODIUM CHLORIDE 500 ML: 9 INJECTION, SOLUTION INTRAVENOUS at 21:12

## 2024-01-01 RX ADMIN — HYDROCORTISONE SODIUM SUCCINATE 25 MG: 100 INJECTION, POWDER, FOR SOLUTION INTRAMUSCULAR; INTRAVENOUS at 00:46

## 2024-01-01 RX ADMIN — AMIODARONE HYDROCHLORIDE 0.5 MG/MIN: 1.8 INJECTION, SOLUTION INTRAVENOUS at 08:40

## 2024-01-01 RX ADMIN — ASPIRIN 81 MG: 81 TABLET, COATED ORAL at 20:24

## 2024-01-01 RX ADMIN — POTASSIUM CHLORIDE 10 MEQ: 750 TABLET, EXTENDED RELEASE ORAL at 13:04

## 2024-01-01 RX ADMIN — CALCIUM GLUCONATE 1 G: 98 INJECTION, SOLUTION INTRAVENOUS at 12:38

## 2024-01-01 RX ADMIN — Medication: at 14:44

## 2024-01-01 RX ADMIN — NOREPINEPHRINE BITARTRATE 0.03 MCG/KG/MIN: 1 SOLUTION INTRAVENOUS at 03:19

## 2024-01-01 RX ADMIN — HYDROCORTISONE SODIUM SUCCINATE 25 MG: 100 INJECTION, POWDER, FOR SOLUTION INTRAMUSCULAR; INTRAVENOUS at 18:17

## 2024-01-01 RX ADMIN — CEFEPIME 2 G: 2 INJECTION, POWDER, FOR SOLUTION INTRAVENOUS at 12:13

## 2024-01-01 RX ADMIN — Medication 0.5 MCG/KG/MIN: at 08:43

## 2024-01-01 RX ADMIN — MIDODRINE HYDROCHLORIDE 10 MG: 5 TABLET ORAL at 17:12

## 2024-01-01 RX ADMIN — WARFARIN SODIUM 2.5 MG: 2.5 TABLET ORAL at 18:17

## 2024-01-01 ASSESSMENT — ACTIVITIES OF DAILY LIVING (ADL)
ADLS_ACUITY_SCORE: 39
ADLS_ACUITY_SCORE: 56
ADLS_ACUITY_SCORE: 54
ADLS_ACUITY_SCORE: 48
ADLS_ACUITY_SCORE: 59
ADLS_ACUITY_SCORE: 61
ADLS_ACUITY_SCORE: 54
ADLS_ACUITY_SCORE: 54
ADLS_ACUITY_SCORE: 62
ADLS_ACUITY_SCORE: 60
ADLS_ACUITY_SCORE: 54
ADLS_ACUITY_SCORE: 60
ADLS_ACUITY_SCORE: 47
ADLS_ACUITY_SCORE: 61
ADLS_ACUITY_SCORE: 35
ADLS_ACUITY_SCORE: 56
ADLS_ACUITY_SCORE: 56
ADLS_ACUITY_SCORE: 54
ADLS_ACUITY_SCORE: 48
ADLS_ACUITY_SCORE: 60
ADLS_ACUITY_SCORE: 60
ADLS_ACUITY_SCORE: 59
ADLS_ACUITY_SCORE: 56
ADLS_ACUITY_SCORE: 35
ADLS_ACUITY_SCORE: 33
ADLS_ACUITY_SCORE: 47
ADLS_ACUITY_SCORE: 47
ADLS_ACUITY_SCORE: 59
ADLS_ACUITY_SCORE: 44
ADLS_ACUITY_SCORE: 35
ADLS_ACUITY_SCORE: 61
ADLS_ACUITY_SCORE: 60
ADLS_ACUITY_SCORE: 56
ADLS_ACUITY_SCORE: 47
ADLS_ACUITY_SCORE: 64
ADLS_ACUITY_SCORE: 59
ADLS_ACUITY_SCORE: 54
ADLS_ACUITY_SCORE: 54
ADLS_ACUITY_SCORE: 48
ADLS_ACUITY_SCORE: 60
ADLS_ACUITY_SCORE: 59
ADLS_ACUITY_SCORE: 56
ADLS_ACUITY_SCORE: 56
ADLS_ACUITY_SCORE: 54
ADLS_ACUITY_SCORE: 62
ADLS_ACUITY_SCORE: 47
ADLS_ACUITY_SCORE: 56
ADLS_ACUITY_SCORE: 54
ADLS_ACUITY_SCORE: 39
ADLS_ACUITY_SCORE: 54
ADLS_ACUITY_SCORE: 61
ADLS_ACUITY_SCORE: 60
ADLS_ACUITY_SCORE: 56
ADLS_ACUITY_SCORE: 62
ADLS_ACUITY_SCORE: 62
ADLS_ACUITY_SCORE: 35
ADLS_ACUITY_SCORE: 56
ADLS_ACUITY_SCORE: 54
ADLS_ACUITY_SCORE: 35
ADLS_ACUITY_SCORE: 54
ADLS_ACUITY_SCORE: 61
ADLS_ACUITY_SCORE: 60
ADLS_ACUITY_SCORE: 56
ADLS_ACUITY_SCORE: 54
ADLS_ACUITY_SCORE: 61
ADLS_ACUITY_SCORE: 56
ADLS_ACUITY_SCORE: 59
ADLS_ACUITY_SCORE: 54
ADLS_ACUITY_SCORE: 58
ADLS_ACUITY_SCORE: 56
ADLS_ACUITY_SCORE: 47
ADLS_ACUITY_SCORE: 62
ADLS_ACUITY_SCORE: 61
ADLS_ACUITY_SCORE: 54
ADLS_ACUITY_SCORE: 59
ADLS_ACUITY_SCORE: 60
ADLS_ACUITY_SCORE: 54
ADLS_ACUITY_SCORE: 54
ADLS_ACUITY_SCORE: 62
ADLS_ACUITY_SCORE: 62
ADLS_ACUITY_SCORE: 54
ADLS_ACUITY_SCORE: 56
ADLS_ACUITY_SCORE: 48
ADLS_ACUITY_SCORE: 62
ADLS_ACUITY_SCORE: 47
ADLS_ACUITY_SCORE: 47
ADLS_ACUITY_SCORE: 56
ADLS_ACUITY_SCORE: 62
ADLS_ACUITY_SCORE: 56
ADLS_ACUITY_SCORE: 35
ADLS_ACUITY_SCORE: 56
ADLS_ACUITY_SCORE: 54
ADLS_ACUITY_SCORE: 55
ADLS_ACUITY_SCORE: 47
ADLS_ACUITY_SCORE: 54
ADLS_ACUITY_SCORE: 58
ADLS_ACUITY_SCORE: 59
ADLS_ACUITY_SCORE: 56
ADLS_ACUITY_SCORE: 54
ADLS_ACUITY_SCORE: 35
ADLS_ACUITY_SCORE: 62
ADLS_ACUITY_SCORE: 56
ADLS_ACUITY_SCORE: 61
ADLS_ACUITY_SCORE: 39
ADLS_ACUITY_SCORE: 59
ADLS_ACUITY_SCORE: 54
ADLS_ACUITY_SCORE: 39
ADLS_ACUITY_SCORE: 54
ADLS_ACUITY_SCORE: 54
ADLS_ACUITY_SCORE: 62
ADLS_ACUITY_SCORE: 59
ADLS_ACUITY_SCORE: 62
ADLS_ACUITY_SCORE: 54
ADLS_ACUITY_SCORE: 56
ADLS_ACUITY_SCORE: 54
ADLS_ACUITY_SCORE: 54
ADLS_ACUITY_SCORE: 62
ADLS_ACUITY_SCORE: 62
ADLS_ACUITY_SCORE: 60
ADLS_ACUITY_SCORE: 47
ADLS_ACUITY_SCORE: 54
ADLS_ACUITY_SCORE: 47
ADLS_ACUITY_SCORE: 61
ADLS_ACUITY_SCORE: 35
ADLS_ACUITY_SCORE: 39
ADLS_ACUITY_SCORE: 47
ADLS_ACUITY_SCORE: 62
ADLS_ACUITY_SCORE: 39
ADLS_ACUITY_SCORE: 54
ADLS_ACUITY_SCORE: 58
ADLS_ACUITY_SCORE: 54
ADLS_ACUITY_SCORE: 62
ADLS_ACUITY_SCORE: 54
ADLS_ACUITY_SCORE: 53
ADLS_ACUITY_SCORE: 39
ADLS_ACUITY_SCORE: 54
ADLS_ACUITY_SCORE: 59
ADLS_ACUITY_SCORE: 59
ADLS_ACUITY_SCORE: 56
ADLS_ACUITY_SCORE: 54
ADLS_ACUITY_SCORE: 56
ADLS_ACUITY_SCORE: 60
ADLS_ACUITY_SCORE: 60
ADLS_ACUITY_SCORE: 54
ADLS_ACUITY_SCORE: 61
ADLS_ACUITY_SCORE: 62
ADLS_ACUITY_SCORE: 54
ADLS_ACUITY_SCORE: 62
ADLS_ACUITY_SCORE: 62
ADLS_ACUITY_SCORE: 47
ADLS_ACUITY_SCORE: 35
ADLS_ACUITY_SCORE: 62
ADLS_ACUITY_SCORE: 47
ADLS_ACUITY_SCORE: 62
ADLS_ACUITY_SCORE: 62
ADLS_ACUITY_SCORE: 56
ADLS_ACUITY_SCORE: 35
ADLS_ACUITY_SCORE: 44
ADLS_ACUITY_SCORE: 56
DEPENDENT_IADLS:: CLEANING;COOKING;LAUNDRY;SHOPPING;MEAL PREPARATION;MEDICATION MANAGEMENT;MONEY MANAGEMENT
ADLS_ACUITY_SCORE: 53
ADLS_ACUITY_SCORE: 55
ADLS_ACUITY_SCORE: 62
ADLS_ACUITY_SCORE: 56
ADLS_ACUITY_SCORE: 61
ADLS_ACUITY_SCORE: 59
ADLS_ACUITY_SCORE: 47
ADLS_ACUITY_SCORE: 60
ADLS_ACUITY_SCORE: 64
ADLS_ACUITY_SCORE: 54
ADLS_ACUITY_SCORE: 56
ADLS_ACUITY_SCORE: 35
ADLS_ACUITY_SCORE: 54
ADLS_ACUITY_SCORE: 59

## 2024-01-01 ASSESSMENT — COLUMBIA-SUICIDE SEVERITY RATING SCALE - C-SSRS
6. HAVE YOU EVER DONE ANYTHING, STARTED TO DO ANYTHING, OR PREPARED TO DO ANYTHING TO END YOUR LIFE?: NO
2. HAVE YOU ACTUALLY HAD ANY THOUGHTS OF KILLING YOURSELF IN THE PAST MONTH?: NO
1. IN THE PAST MONTH, HAVE YOU WISHED YOU WERE DEAD OR WISHED YOU COULD GO TO SLEEP AND NOT WAKE UP?: NO

## 2024-01-12 NOTE — TELEPHONE ENCOUNTER
Called pt to discuss status of LDKT evaluation. He has not had any success with identifying living donors and would like to close out his file. If he does identify a potential donor, he could be referred in the future as his evaluation was done in 8/2022. He had no further questions at this time.

## 2024-01-17 NOTE — COMMITTEE REVIEW
Abdominal Patient Discussion Note Transplant Coordinator: Lillie Rogel  Transplant Surgeon: Dr. José Manuel Bocanegra      Referring Physician: Juan Justin    Committee Review Members:  Nephrology Fran Sequeira MD, Drew Christopher, RABIA MOON   Nutrition Liane Duque, MARY   Pharmacist Letty Jose, [facility]    - Clinical Pamela Sirena Stephenson, [facility], Martina Adams, [facility]   Transplant CHAD SHANNON, RN, Malgorzata Vilchis, RN, Lillie Rogel, RN, Martina Garcia, RN, Rebecca De Jesus, NP, Rebecca Cifuentes, CHIRAG, Pat Huggins, CHIRAG, Ceci Sheridan, CHIRAG, Mindy Carrasco RN   Transplant Surgery Gladis Duran, RABIA MOON, Benny Mirza MD       Additional Discussion Notes and Findings: Pt was evaluated for kidney transplant on 8/1/2022 and was approved for LDKT only.  The pt has not been able to identify any potential living donors and is agreeable to ending his evaluation at this time. He understands that if he were to identify a donor he can return for an updated PKE visit and we will reconsider him as a candidate for LDKT. Pt's evaluation will be ended at this time. Will send letter and update referring provider.

## 2024-01-18 NOTE — TELEPHONE ENCOUNTER
Called pt's dialysis unit, left a VM explaining that we are closing the pt's evaluation as he is approved for LDKT only and has no potential donors. Explained he could be re-referred in the future is a donor comes forward. Provided direct line for any follow up questions. Letter sent.

## 2024-01-18 NOTE — LETTER
January 18, 2024    Ijeoma Polk  [address] [place] [id number]      Dear Mr. Polk,   The purpose of this letter is to let you know that on 1/17/2024 the [facility] Multi-Disciplinary Selection Team reviewed the results of your transplant evaluation.  Because you were approved only for living donor kidney transplant and do not have any potential donors at this time, we will be ending your evaluation. If you identify a potential living donor, please call our office and we can reevaluate you as a candidate.   Important things you should know:  If you would like to discuss the decision, or if your medical status changes you may schedule a return visits with your doctor by calling [phone] and asking to speak to your transplant coordinator.  We recommend that you continue to follow up with your primary care doctor in order to manage your health concerns.  We want you to know that our program has physician and surgeon coverage 24 hours a day, 365 days a year. In addition, our transplant surgeons and physicians will not be on call for two or more transplant programs more than 30 miles apart unless the circumstances have been reviewed and approved by the [employer] Organ [employer]aring (UNOS) Membership and Professional Standards Committee (MPSC). Finally, our primary physician and primary surgeons are not designated as the primary surgeon or primary physician at more than 1 transplant hospital. If this coverage changes or there are substantial program changes, you will be notified in writing by letter.   Attached is a letter from UN[employer] that describes the services and information offered to patients by UNOS and the Organ Procurement and Transplantation Network (OPTN).    Thank you for allowing us to participate in your care.  We wish you well.  Sincerely,  Lillie Rogel RN, Pre-Kidney/Pancreas Transplant Coordinator       Solid Organ Transplant  [facility]  [facility]    Enclosures: UNOS Letter  cc: Care Team    The Organ Procurement and Transplantation Network Toll-free patient services line:  0-640-537-9651  Your resource for organ transplant information    Staffed 8:30 am - 5:00 pm ET Monday - Friday Leave a message 24/7 to receive a call back    The Organ Procurement and Transplantation Network (OPTN) is the national transplant system. It makes the policies that decide how donated organs are matched to patients waiting for a transplant. The OPTN:    Makes sure donated organs get matched to people on the transplant waiting list  Tells people about the donation and transplant processes  Makes sure that the public knows about the need for more organ and tissue donations    The OPTN has a free patient services line that you can call to:  Get more information about:  Organ donation and organ transplants  Donation and transplant policies  Get an information kit with:  A list of transplant hospitals  Waiting list information  Talk about any questions you may have about your transplant hospital or organ procurement organization. The staff will do their best to help you or point you to others who may help.  Find out how you can volunteer with the OPTN and help shape transplant policy     The patient services line number is: 6-192-290-3879    Patient services line staff CANNOT answer questions about your own medical care, including:  Waiting list status  Test results  Medical records  You will need to call your transplant hospital for this information.    The following websites have more information about transplantation and donation:    OPTN: https://optn.transplant.hrsa.gov/    For potential living donors and transplant recipients:    Living with transplant: https://www.transplantliving.org/    Living donation process: https://optn.transplant.hrsa.gov/living-donation/    Financial assistance:  https://www.livingdonorassistance.org/    Transplantation data: https://www.srtr.org/    Organ donation: https://www.organdonor.gov/    Volunteer with the OPTN: https://optn.transplant.[facility].gov/get-involved/

## 2024-01-22 NOTE — TELEPHONE ENCOUNTER
Provider Call: General  Route to LPN    Reason for call: Call from Roxana  has questions to review with Lillie     Call back needed? Yes    Return Call Needed  Same as documented in contacts section  When to return call?: Same day: Route High Priority

## 2024-01-22 NOTE — TELEPHONE ENCOUNTER
Returned call to EMMIE Ramsay/ Neha. He asked about the pt being listed for transplant. I explained that the pt was never placed on the WL since he was approved for LDKT only. I explained that since the pt does not have potential donors his evaluation was ended and he can be referred for reevaluation if/when a donor is identified. Devendra expressed understanding, they may refer the pt elsewhere for consideration on the DDKT list.

## 2024-08-16 PROBLEM — S42.492A CLOSED BICONDYLAR FRACTURE OF DISTAL END OF LEFT HUMERUS: Status: ACTIVE | Noted: 2024-01-01

## 2024-08-16 PROBLEM — R31.29 MICROSCOPIC HEMATURIA: Status: ACTIVE | Noted: 2023-03-13

## 2024-08-16 PROBLEM — K90.9 INTESTINAL MALABSORPTION, UNSPECIFIED TYPE: Status: ACTIVE | Noted: 2019-04-18

## 2024-08-16 PROBLEM — Z99.2 ANEMIA IN CHRONIC KIDNEY DISEASE, ON CHRONIC DIALYSIS (H): Chronic | Status: ACTIVE | Noted: 2022-02-07

## 2024-08-16 PROBLEM — N18.6 ANEMIA IN CHRONIC KIDNEY DISEASE, ON CHRONIC DIALYSIS (H): Chronic | Status: ACTIVE | Noted: 2022-02-07

## 2024-08-16 PROBLEM — D63.1 ANEMIA IN CHRONIC KIDNEY DISEASE, ON CHRONIC DIALYSIS (H): Chronic | Status: ACTIVE | Noted: 2022-02-07

## 2024-08-16 PROBLEM — N17.9 AKI (ACUTE KIDNEY INJURY) (H): Status: ACTIVE | Noted: 2022-02-01

## 2024-08-16 PROBLEM — I12.9 HYPERTENSIVE NEPHROSCLEROSIS: Status: ACTIVE | Noted: 2021-04-29

## 2024-08-16 PROBLEM — N48.9 PENILE LESION: Status: ACTIVE | Noted: 2020-08-27

## 2024-08-16 PROBLEM — S42.402A: Status: ACTIVE | Noted: 2024-01-01

## 2024-08-16 PROBLEM — S82.035A CLOSED NONDISPLACED TRANSVERSE FRACTURE OF LEFT PATELLA: Status: ACTIVE | Noted: 2024-01-01

## 2024-08-16 NOTE — TELEPHONE ENCOUNTER
[facility] Geriatrics Triage Nurse INR     Provider: GILL Morillo  Facility: [facility]   Facility Type:  TCU    Reason for call: INR  Diagnosis/Goal: A. Fib    Date INR New Dose/Orders   8/13 1.4 2.5 mg Wed and 5 mg AOD   8/16 1.18 5 mg daily    Home dose: 2.5 mg Wed and 5 mg AOD    Heparin/Lovenox:  No  Currently on ABX?: No  Other interacting medication:  None  Missed or refused doses: No    Verbal Order/Direction given by Provider: Coumadin 5 mg PO daily. Check INR on 8/19/24    Provider Giving Order:  GILL Morillo    Verbal Order given to: Hetal Silva RN

## 2024-08-16 NOTE — PROGRESS NOTES
[facility] GERIATRIC SERVICES  Chief Complaint   Patient presents with    [facility] F/U     [place] Medical Record Number:  [id number]  Place of Service where encounter took place:  [facility] ([facility]) [[id number]]  Code Status:  Full Code     HISTORY:      HPI:  Ijeoma Polk  is 74 year old (1950) undergoing physical and occupational therapy. Excerpted from records who was walking into his friend's house and tripped. Mechanical fall, no preceding dizziness. No loss of consciousness.  Imaging showed small volume subarachnoid hemorrhage, sternal fracture, right 6-8 rib fractures. Kcentra was given for reversal for INR of 6.0.      8/12- OR with Ortho and Vascular surgery.     Nephrology followed him while here. He dialyzed on MWF.  This is Dr. Toscano last assessment and plan:   Ijeoma Polk is a 74 yr. old male w/a PMHx of ESRD on HD, he presented for admission w/multiple injuries (subarachnoid hemorrhage, sternal fracture, rib fractures and left sided distal humerus fracture) sustained after a fall.       Today he is seen to review VS, Labs, routine visit and to establish care.  He denied chest pain shortness of breath cough or congestion.  He reported no BM since surgery.  Abdomen nontender, bowel sounds active x 4 quadrants he is passing flatus.  Also with an ORIF left patella and left distal humerus fistula left upper extremity.  Writer unable to access it due to the Ace and dressing.  Per the notes it appears he was sent to the OR for a left thrombectomy of the fistula.  He does have a port right upper chest.  Placed on a soft diet unable to wear his dentures at this time.  He does have significant bruising around his mouth from the fall.  INR taken today and he will resume his 5 mg of Coumadin daily with an INR check on Monday, 8/19/2024.  He reports his pain is controlled.  Noted to have swelling left upper extremity.  Multiple right rib fractures encourage cough and deep breathe and  I-S    ALLERGIES:Furosemide, Lorazepam, Penicillins, and Sulfadiazine    PAST MEDICAL HISTORY:   Past Medical History:   Diagnosis Date    Chronic diarrhea     End stage renal disease (H)     H/O gastric bypass     History of bowel resection     Hypertension, surgical complication 3/9/2009    Hypokalemia     Membranous nephropathy determined by biopsy     PAF (paroxysmal atrial fibrillation) (H)     Type 2 diabetes mellitus without complication, without long-term current use of insulin (H) 08/10/2022       PAST SURGICAL HISTORY:   has no past surgical history on file.    FAMILY HISTORY: family history includes Alcoholism in his father; Emphysema in his mother.    SOCIAL HISTORY:  reports that he has never smoked. He has never used smokeless tobacco. He reports that he does not currently use alcohol. He reports that he does not use drugs.    ROS:  Constitutional: Negative for activity change, appetite change, fatigue and fever.  Nonweightbearing left lower extremity and left upper extremity  HENT: Negative for congestion.    Respiratory: Negative for cough, shortness of breath and wheezing.  Pulmonary nodules per records  Cardiovascular: Negative for chest pain and leg swelling.  Dialysis port right upper chest  Gastrointestinal: Negative for abdominal distention, abdominal pain, constipation, diarrhea and nausea.   Genitourinary: Negative for dysuria.   Musculoskeletal: Negative for arthralgia. Negative for back pain.  ORIF left patella and left distal humerus none weightbearing left upper and lower extremity  Skin: Negative for color change and wound.  Significant ecchymosis around his mouth  Neurological: Negative for dizziness.   Psychiatric/Behavioral: Negative for agitation, behavioral problems and confusion.     Physical Exam:  Constitutional:       Appearance: Patient is well-developed.   HENT:      Head: Normocephalic.   Eyes:      Conjunctiva/sclera: Conjunctivae normal.   Neck:      Musculoskeletal: Normal  range of motion.   Cardiovascular:      Rate and Rhythm: Normal rate and regular rhythm.      Heart sounds: Normal heart sounds. No murmur.   Pulmonary:      Effort: No respiratory distress.      Breath sounds: Normal breath sounds. No wheezing or rales.   Abdominal:      General: Bowel sounds are normal. There is no distension.      Palpations: Abdomen is soft.      Tenderness: There is no abdominal tenderness.   Musculoskeletal:       Normal range of motion.   Nonweightbearing left upper and lower extremity  Skin:General:        Skin is warm.   Neurological:         Mental Status: Patient is alert and oriented to person, place, and time.   Psychiatric:         Behavior: Behavior normal.     Vitals:BP (!) 153/75   Pulse 85   Temp 98.9  F (37.2  C)   Resp 20   Ht 1.829 m (6')   Wt 80.3 kg (177 lb)   SpO2 97%   BMI 24.01 kg/m   and Body mass index is 24.01 kg/m .    Lab/Diagnostic data:   No results found for this or any previous visit (from the past 240 hour(s)).     MEDICATIONS:  MED REC REQUIRED  Post Medication Reconciliation Status: discharge medications reconciled, continue medications without change          Review of your medicines            Accurate as of August 16, 2024 11:59 PM. If you have any questions, ask your nurse or doctor.                CONTINUE these medicines which may have CHANGED, or have new prescriptions. If we are uncertain of the size of tablets/capsules you have at home, strength may be listed as something that might have changed.        Dose / Directions   urea 20 % external cream  Commonly known as: GORMEL  This may have changed: Another medication with the same name was removed. Continue taking this medication, and follow the directions you see here.  Changed by: Brittanie Milton      Apply topically as needed  Refills: 0            CONTINUE these medicines which have NOT CHANGED        Dose / Directions   acetaminophen 500 MG tablet  Commonly known as: TYLENOL      Dose: 1,000  mg  Take 1,000 mg by mouth every 6 hours as needed for mild pain  Refills: 0     aspirin 81 MG EC tablet      Dose: 81 mg  Take 81 mg by mouth daily  Refills: 0     atorvastatin 10 MG tablet  Commonly known as: LIPITOR      TAKE 1 TABLET BY MOUTH ONCE DAILY AT BEDTIME  Refills: 0     diphenoxylate-atropine 2.5-0.025 MG tablet  Commonly known as: LOMOTIL      Dose: 1 tablet  Take 1 tablet by mouth  Refills: 0     Euthyrox 50 MCG tablet  Generic drug: levothyroxine      Dose: 1 tablet  Take 1 tablet by mouth daily  Refills: 0     Lidocaine 4 % Patch  Commonly known as: LIDOCARE      Dose: 1 patch  Place 1 patch onto the skin every 24 hours To prevent lidocaine toxicity, patient should be patch free for 12 hrs daily.  Refills: 0     lidocaine-prilocaine 2.5-2.5 % external cream  Commonly known as: EMLA      Apply topically daily as needed for moderate pain  Refills: 0     Melatonin 10 MG Tabs tablet      Dose: 10 mg  Take 10 mg by mouth nightly as needed for sleep  Refills: 0     methocarbamol 750 MG tablet  Commonly known as: ROBAXIN      Dose: 750 mg  Take 750 mg by mouth 4 times daily as needed for muscle spasms  Refills: 0     metoprolol succinate ER 50 MG 24 hr tablet  Commonly known as: TOPROL XL      Dose: 100 mg  Take 100 mg by mouth daily  Refills: 0     omeprazole 20 MG EC tablet  Commonly known as: PriLOSEC OTC      Dose: 20 mg  Take 20 mg by mouth  Refills: 0     oxyBUTYnin 5 MG tablet  Commonly known as: DITROPAN      Dose: 5 mg  Take 5 mg by mouth 3 times daily  Refills: 0     oxyCODONE 5 MG tablet  Commonly known as: ROXICODONE      Dose: 10 mg  Take 10 mg by mouth every 4 hours as needed for severe pain  Refills: 0     PEDIATRIC MULTIPLE VIT-C-FA PO      Dose: 1 tablet  Take 1 tablet by mouth daily  Refills: 0     polyethylene glycol 17 g packet  Commonly known as: MIRALAX      Dose: 1 packet  Take 1 packet by mouth daily  Refills: 0     Crystal-Aleyda Rx 1 mg Tabs      Dose: 1 tablet  Take 1 tablet by mouth  daily  Refills: 0     senna-docusate 8.6-50 MG tablet  Commonly known as: SENOKOT-S/PERICOLACE      Dose: 2 tablet  Take 2 tablets by mouth daily  Refills: 0     warfarin ANTICOAGULANT 5 MG tablet  Commonly known as: COUMADIN      Take 1 to 1 1/2 tabs po daily or as directed per [facility]  Refills: 0            STOP taking      augmented betamethasone dipropionate 0.05 % external cream  Commonly known as: DIPROLENE AF  Stopped by: Brittanie Milton        calcium acetate 667 MG Caps capsule  Commonly known as: PHOSLO  Stopped by: Brittanie Milton        cholecalciferol 125 MCG (5000 UT) Caps  Stopped by: Brittanie Milton        cyanocobalamin 1000 MCG sublingual tablet  Commonly known as: VITAMIN B-12  Stopped by: Brittanie Milton        digoxin 125 MCG tablet  Commonly known as: LANOXIN  Stopped by: Brittanie Milton        loperamide 2 MG tablet  Commonly known as: IMODIUM A-D  Stopped by: Brittanie Milton        metolazone 2.5 MG tablet  Commonly known as: ZAROXOLYN  Stopped by: Brittanie Milton        ofloxacin 0.3 % ophthalmic solution  Commonly known as: OCUFLOX  Stopped by: Brittanie Milton        potassium chloride ER 20 MEQ CR tablet  Commonly known as: K-TAB  Stopped by: Brittanie Milton        RA Probiotic Digestive Care Caps  Stopped by: Brittanie Milton        sucralfate 1 GM tablet  Commonly known as: CARAFATE  Stopped by: Brittanie Milton        torsemide 10 MG tablet  Commonly known as: DEMADEX  Stopped by: Brittanie Milton        triamcinolone 0.025 % external ointment  Commonly known as: KENALOG  Stopped by: Brittanie Milton        vitamin D2 51889 units (1250 mcg) capsule  Commonly known as: ERGOCALCIFEROL  Stopped by: Brittanie Milton        Zinc 30 MG Tabs  Stopped by: Brittanie Milton                 ASSESSMENT/PLAN  Encounter Diagnoses   Name Primary?    Physical deconditioning Yes    Pain management     Dialysis patient (H24)     Closed bicondylar fracture of distal end of left humerus with routine healing, subsequent encounter      Physical  deconditioning PT/OT    Dialysis Patient M/W/F dialysis port right upper extremity fistula left upper extremity    S/P ORIF left distal humerus and left patella follow up with Orthopedics pain management monitoring report for changes in CMS,     Pain Management -Scheduled Tylenol,Lidocaine patch Robaxin scheduled, Prn Oxycodone     Dry skin on Urea cream    Insomnia- PRN Melatonin    Atrial Fibrillation continue Metoprolol Succinate On Coumadin, monitor an adjust per INR;s    GERD On Omeprazole     BPH Home Oxybutynin    HDL On atorvastatin     Hypothyroidism on Levothyroxine TSH 7.39 on 8/10/24     Constipation medications adjusted    Electronically signed by: Brittanie Milton, CNP

## 2024-08-16 NOTE — LETTER
8/16/2024      Ijeoma Polk  [address]  [place] [address]         HEALTH GERIATRIC SERVICES  Chief Complaint   Patient presents with     [facility] F/U     [place] Medical Record Number:  [id number]  Place of Service where encounter took place:  [facility] ([facility]) [[id number]]  Code Status:  Full Code     HISTORY:      HPI:  Ijeoma Polk  is 74 year old (1950) undergoing physical and occupational therapy. Excerpted from records who was walking into his friend's house and tripped. Mechanical fall, no preceding dizziness. No loss of consciousness.  Imaging showed small volume subarachnoid hemorrhage, sternal fracture, right 6-8 rib fractures. Kcentra was given for reversal for INR of 6.0.      8/12- OR with Ortho and Vascular surgery.     Nephrology followed him while here. He dialyzed on MWF.  This is Dr. Toscano last assessment and plan:   Ijeoma Polk is a 74 yr. old male w/a PMHx of ESRD on HD, he presented for admission w/multiple injuries (subarachnoid hemorrhage, sternal fracture, rib fractures and left sided distal humerus fracture) sustained after a fall.       Today he is seen to review VS, Labs, routine visit and to establish care.  He denied chest pain shortness of breath cough or congestion.  He reported no BM since surgery.  Abdomen nontender, bowel sounds active x 4 quadrants he is passing flatus.  Also with an ORIF left patella and left distal humerus fistula left upper extremity.  Writer unable to access it due to the Ace and dressing.  Per the notes it appears he was sent to the OR for a left thrombectomy of the fistula.  He does have a port right upper chest.  Placed on a soft diet unable to wear his dentures at this time.  He does have significant bruising around his mouth from the fall.  INR taken today and he will resume his 5 mg of Coumadin daily with an INR check on Monday, 8/19/2024.  He reports his pain is controlled.  Noted to have swelling left upper extremity.   Multiple right rib fractures encourage cough and deep breathe and I-S    ALLERGIES:Furosemide, Lorazepam, Penicillins, and Sulfadiazine    PAST MEDICAL HISTORY:   Past Medical History:   Diagnosis Date     Chronic diarrhea      End stage renal disease (H)      H/O gastric bypass      History of bowel resection      Hypertension, surgical complication 3/9/2009     Hypokalemia      Membranous nephropathy determined by biopsy      PAF (paroxysmal atrial fibrillation) (H)      Type 2 diabetes mellitus without complication, without long-term current use of insulin (H) 08/10/2022       PAST SURGICAL HISTORY:   has no past surgical history on file.    FAMILY HISTORY: family history includes Alcoholism in his father; Emphysema in his mother.    SOCIAL HISTORY:  reports that he has never smoked. He has never used smokeless tobacco. He reports that he does not currently use alcohol. He reports that he does not use drugs.    ROS:  Constitutional: Negative for activity change, appetite change, fatigue and fever.  Nonweightbearing left lower extremity and left upper extremity  HENT: Negative for congestion.    Respiratory: Negative for cough, shortness of breath and wheezing.  Pulmonary nodules per records  Cardiovascular: Negative for chest pain and leg swelling.  Dialysis port right upper chest  Gastrointestinal: Negative for abdominal distention, abdominal pain, constipation, diarrhea and nausea.   Genitourinary: Negative for dysuria.   Musculoskeletal: Negative for arthralgia. Negative for back pain.  ORIF left patella and left distal humerus none weightbearing left upper and lower extremity  Skin: Negative for color change and wound.  Significant ecchymosis around his mouth  Neurological: Negative for dizziness.   Psychiatric/Behavioral: Negative for agitation, behavioral problems and confusion.     Physical Exam:  Constitutional:       Appearance: Patient is well-developed.   HENT:      Head: Normocephalic.   Eyes:       Conjunctiva/sclera: Conjunctivae normal.   Neck:      Musculoskeletal: Normal range of motion.   Cardiovascular:      Rate and Rhythm: Normal rate and regular rhythm.      Heart sounds: Normal heart sounds. No murmur.   Pulmonary:      Effort: No respiratory distress.      Breath sounds: Normal breath sounds. No wheezing or rales.   Abdominal:      General: Bowel sounds are normal. There is no distension.      Palpations: Abdomen is soft.      Tenderness: There is no abdominal tenderness.   Musculoskeletal:       Normal range of motion.   Nonweightbearing left upper and lower extremity  Skin:General:        Skin is warm.   Neurological:         Mental Status: Patient is alert and oriented to person, place, and time.   Psychiatric:         Behavior: Behavior normal.     Vitals:BP (!) 153/75   Pulse 85   Temp 98.9  F (37.2  C)   Resp 20   Ht 1.829 m (6')   Wt 80.3 kg (177 lb)   SpO2 97%   BMI 24.01 kg/m   and Body mass index is 24.01 kg/m .    Lab/Diagnostic data:   No results found for this or any previous visit (from the past 240 hour(s)).     MEDICATIONS:  MED REC REQUIRED  Post Medication Reconciliation Status: discharge medications reconciled, continue medications without change          Review of your medicines            Accurate as of August 16, 2024 11:59 PM. If you have any questions, ask your nurse or doctor.                CONTINUE these medicines which may have CHANGED, or have new prescriptions. If we are uncertain of the size of tablets/capsules you have at home, strength may be listed as something that might have changed.        Dose / Directions   urea 20 % external cream  Commonly known as: GORMEL  This may have changed: Another medication with the same name was removed. Continue taking this medication, and follow the directions you see here.  Changed by: Brittanie Milton      Apply topically as needed  Refills: 0            CONTINUE these medicines which have NOT CHANGED        Dose / Directions    acetaminophen 500 MG tablet  Commonly known as: TYLENOL      Dose: 1,000 mg  Take 1,000 mg by mouth every 6 hours as needed for mild pain  Refills: 0     aspirin 81 MG EC tablet      Dose: 81 mg  Take 81 mg by mouth daily  Refills: 0     atorvastatin 10 MG tablet  Commonly known as: LIPITOR      TAKE 1 TABLET BY MOUTH ONCE DAILY AT BEDTIME  Refills: 0     diphenoxylate-atropine 2.5-0.025 MG tablet  Commonly known as: LOMOTIL      Dose: 1 tablet  Take 1 tablet by mouth  Refills: 0     Euthyrox 50 MCG tablet  Generic drug: levothyroxine      Dose: 1 tablet  Take 1 tablet by mouth daily  Refills: 0     Lidocaine 4 % Patch  Commonly known as: LIDOCARE      Dose: 1 patch  Place 1 patch onto the skin every 24 hours To prevent lidocaine toxicity, patient should be patch free for 12 hrs daily.  Refills: 0     lidocaine-prilocaine 2.5-2.5 % external cream  Commonly known as: EMLA      Apply topically daily as needed for moderate pain  Refills: 0     Melatonin 10 MG Tabs tablet      Dose: 10 mg  Take 10 mg by mouth nightly as needed for sleep  Refills: 0     methocarbamol 750 MG tablet  Commonly known as: ROBAXIN      Dose: 750 mg  Take 750 mg by mouth 4 times daily as needed for muscle spasms  Refills: 0     metoprolol succinate ER 50 MG 24 hr tablet  Commonly known as: TOPROL XL      Dose: 100 mg  Take 100 mg by mouth daily  Refills: 0     omeprazole 20 MG EC tablet  Commonly known as: PriLOSEC OTC      Dose: 20 mg  Take 20 mg by mouth  Refills: 0     oxyBUTYnin 5 MG tablet  Commonly known as: DITROPAN      Dose: 5 mg  Take 5 mg by mouth 3 times daily  Refills: 0     oxyCODONE 5 MG tablet  Commonly known as: ROXICODONE      Dose: 10 mg  Take 10 mg by mouth every 4 hours as needed for severe pain  Refills: 0     PEDIATRIC MULTIPLE VIT-C-FA PO      Dose: 1 tablet  Take 1 tablet by mouth daily  Refills: 0     polyethylene glycol 17 g packet  Commonly known as: MIRALAX      Dose: 1 packet  Take 1 packet by mouth  daily  Refills: 0     Crystal-Aleyda Rx 1 mg Tabs      Dose: 1 tablet  Take 1 tablet by mouth daily  Refills: 0     senna-docusate 8.6-50 MG tablet  Commonly known as: SENOKOT-S/PERICOLACE      Dose: 2 tablet  Take 2 tablets by mouth daily  Refills: 0     warfarin ANTICOAGULANT 5 MG tablet  Commonly known as: COUMADIN      Take 1 to 1 1/2 tabs po daily or as directed per [facility]  Refills: 0            STOP taking      augmented betamethasone dipropionate 0.05 % external cream  Commonly known as: DIPROLENE AF  Stopped by: Brittanie Milton        calcium acetate 667 MG Caps capsule  Commonly known as: PHOSLO  Stopped by: Brittanie Milton        cholecalciferol 125 MCG (5000 UT) Caps  Stopped by: Brittanie Milton        cyanocobalamin 1000 MCG sublingual tablet  Commonly known as: VITAMIN B-12  Stopped by: Brittanie Milton        digoxin 125 MCG tablet  Commonly known as: LANOXIN  Stopped by: Brittanie Milton        loperamide 2 MG tablet  Commonly known as: IMODIUM A-D  Stopped by: Brittanie Milton        metolazone 2.5 MG tablet  Commonly known as: ZAROXOLYN  Stopped by: Brittanie Milton        ofloxacin 0.3 % ophthalmic solution  Commonly known as: OCUFLOX  Stopped by: Brittanie Milton        potassium chloride ER 20 MEQ CR tablet  Commonly known as: K-TAB  Stopped by: Brittanie Milton        RA Probiotic Digestive Care Caps  Stopped by: Brittanie Milton        sucralfate 1 GM tablet  Commonly known as: CARAFATE  Stopped by: Brittanie Milton        torsemide 10 MG tablet  Commonly known as: DEMADEX  Stopped by: Brittanie Milton        triamcinolone 0.025 % external ointment  Commonly known as: KENALOG  Stopped by: Brittanie Milton        vitamin D2 55248 units (1250 mcg) capsule  Commonly known as: ERGOCALCIFEROL  Stopped by: Brittanie Milton        Zinc 30 MG Tabs  Stopped by: Brittanie Milton                 ASSESSMENT/PLAN  Encounter Diagnoses   Name Primary?     Physical deconditioning Yes     Pain management      Dialysis patient (H24)      Closed bicondylar fracture of  distal end of left humerus with routine healing, subsequent encounter      Physical deconditioning PT/OT    Dialysis Patient M/W/F dialysis port right upper extremity fistula left upper extremity    S/P ORIF left distal humerus and left patella follow up with Orthopedics pain management monitoring report for changes in CMS,     Pain Management -Scheduled Tylenol,Lidocaine patch Robaxin scheduled, Prn Oxycodone     Dry skin on Urea cream    Insomnia- PRN Melatonin    Atrial Fibrillation continue Metoprolol Succinate On Coumadin, monitor an adjust per INR;s    GERD On Omeprazole     BPH Home Oxybutynin    HDL On atorvastatin     Hypothyroidism on Levothyroxine TSH 7.39 on 8/10/24     Constipation medications adjusted    Electronically signed by: Brittanie Milton CNP       Sincerely,        Brittanie Milton CNP

## 2024-08-19 NOTE — LETTER
8/19/2024      Ijeoma Polk  [address]  [place] [place] [facility] [address]         HEALTH GERIATRIC SERVICES  Chief Complaint   Patient presents with     RECHECK     [facility] Medical Record Number:  [id number]  Place of Service where encounter took place:  [facility] ([facility]) [[id number]]  Code Status:  Full Code     HISTORY:      HPI:  Ijeoma Polk  is 74 year old (1950) undergoing physical and occupational therapy. Excerpted from records who was walking into his friend's house and tripped. Mechanical fall, no preceding dizziness. No loss of consciousness.  Imaging showed small volume subarachnoid hemorrhage, sternal fracture, right 6-8 rib fractures. Kcentra was given for reversal for INR of 6.0.      8/12- OR with Ortho and Vascular surgery.     Nephrology followed him while here. He dialyzed on MWF.  This is Dr. Toscano last assessment and plan:   Ijeoma Polk is a 74 yr. old male w/a PMHx of ESRD on HD, he presented for admission w/multiple injuries (subarachnoid hemorrhage, sternal fracture, rib fractures and left sided distal humerus fracture) sustained after a fall.       Today he is seen to review VS, Labs, routine visit.  Labs reviewed h and potassium low at 2.8, replacement ordered and he will have a recheck in the a.m.  Hemoglobin also noted to be 7.8 and this will also be monitored closely. He denied chest pain shortness of breath cough or congestion.  He is moving his bowels.  He reports his pain is controlled.  INR reviewed 2.44 up from 1.18.  Also with an ORIF left patella and left distal humerus fistula left upper extremity.    Per the notes  he was sent to the OR for a left thrombectomy of the fistula.  He does have a port right upper chest.   He was recently.  Bruising around his mouth and neck appears to be resolving.  He is with swelling left upper extremity.  Multiple right rib fractures encourage cough and deep breathe and I-S    ALLERGIES:Furosemide, Lorazepam, Penicillins, and  Sulfadiazine    PAST MEDICAL HISTORY:   Past Medical History:   Diagnosis Date     Chronic diarrhea      End stage renal disease (H)      H/O gastric bypass      History of bowel resection      Hypertension, surgical complication 3/9/2009     Hypokalemia      Membranous nephropathy determined by biopsy      PAF (paroxysmal atrial fibrillation) (H)      Type 2 diabetes mellitus without complication, without long-term current use of insulin (H) 08/10/2022       PAST SURGICAL HISTORY:   has no past surgical history on file.    FAMILY HISTORY: family history includes Alcoholism in his father; Emphysema in his mother.    SOCIAL HISTORY:  reports that he has never smoked. He has never used smokeless tobacco. He reports that he does not currently use alcohol. He reports that he does not use drugs.    ROS:  Constitutional: Negative for activity change, appetite change, fatigue and fever.  Nonweightbearing left lower extremity and left upper extremity  HENT: Negative for congestion.    Respiratory: Negative for cough, shortness of breath and wheezing.  Pulmonary nodules per records  Cardiovascular: Negative for chest pain and leg swelling.  Dialysis port right upper chest  Gastrointestinal: Negative for abdominal distention, abdominal pain, constipation, diarrhea and nausea.   Genitourinary: Negative for dysuria.   Musculoskeletal: Negative for arthralgia. Negative for back pain.  ORIF left patella and left distal humerus none weightbearing left upper and lower extremity  Skin: Negative for color change and wound.  ecchymosis around his mouth resolving   Neurological: Negative for dizziness.   Psychiatric/Behavioral: Negative for agitation, behavioral problems and confusion.     Physical Exam:  Constitutional:       Appearance: Patient is well-developed.   HENT:      Head: Normocephalic.   Eyes:      Conjunctiva/sclera: Conjunctivae normal.   Neck:      Musculoskeletal: Normal range of motion.   Cardiovascular:      Rate and  Rhythm: Normal rate and regular rhythm.      Heart sounds: Normal heart sounds. No murmur.   Pulmonary:      Effort: No respiratory distress.      Breath sounds: Normal breath sounds. No wheezing or rales.   Abdominal:      General: Bowel sounds are normal. There is no distension.      Palpations: Abdomen is soft.      Tenderness: There is no abdominal tenderness.   Musculoskeletal:       Normal range of motion.   Nonweightbearing left upper and lower extremity  Skin:General:        Skin is warm.   Neurological:         Mental Status: Patient is alert and oriented to person, place, and time.   Psychiatric:         Behavior: Behavior normal.     Vitals:/56   Pulse 85   Temp 97.1  F (36.2  C)   Resp 18   Ht 1.829 m (6')   Wt 80.3 kg (177 lb)   SpO2 96%   BMI 24.01 kg/m   and Body mass index is 24.01 kg/m .    Lab/Diagnostic data:   Recent Results (from the past 240 hour(s))   INR    Collection Time: 08/16/24  5:57 AM   Result Value Ref Range    INR 1.18 (H) 0.85 - 1.15   CBC with platelets    Collection Time: 08/19/24  5:54 AM   Result Value Ref Range    WBC Count 9.6 4.0 - 11.0 10e3/uL    RBC Count 2.37 (L) 4.40 - 5.90 10e6/uL    Hemoglobin 7.8 (L) 13.3 - 17.7 g/dL    Hematocrit 24.1 (L) 40.0 - 53.0 %     (H) 78 - 100 fL    MCH 32.9 26.5 - 33.0 pg    MCHC 32.4 31.5 - 36.5 g/dL    RDW 16.8 (H) 10.0 - 15.0 %    Platelet Count 229 150 - 450 10e3/uL   Magnesium    Collection Time: 08/19/24  5:54 AM   Result Value Ref Range    Magnesium 2.0 1.7 - 2.3 mg/dL   Glucose (OUTREACH)    Collection Time: 08/19/24  5:54 AM   Result Value Ref Range    Glucose 82 70 - 99 mg/dL   Basic Metabolic Panel No Glucose (OUTREACH)    Collection Time: 08/19/24  5:54 AM   Result Value Ref Range    Sodium 132 (L) 135 - 145 mmol/L    Potassium 2.8 (L) 3.4 - 5.3 mmol/L    Chloride 95 (L) 98 - 107 mmol/L    Carbon Dioxide (CO2) 26 22 - 29 mmol/L    Anion Gap 11 7 - 15 mmol/L    Urea Nitrogen 35.5 (H) 8.0 - 23.0 mg/dL     Creatinine 3.50 (H) 0.67 - 1.17 mg/dL    GFR Estimate 18 (L) >60 mL/min/1.73m2    Calcium 7.5 (L) 8.8 - 10.4 mg/dL   INR    Collection Time: 08/19/24  5:54 AM   Result Value Ref Range    INR 2.44 (H) 0.85 - 1.15        MEDICATIONS:  MED REC REQUIRED  Post Medication Reconciliation Status: discharge medications reconciled, continue medications without change          Review of your medicines            Accurate as of August 19, 2024  8:25 PM. If you have any questions, ask your nurse or doctor.                CONTINUE these medicines which may have CHANGED, or have new prescriptions. If we are uncertain of the size of tablets/capsules you have at home, strength may be listed as something that might have changed.        Dose / Directions   diphenoxylate-atropine 2.5-0.025 MG tablet  Commonly known as: LOMOTIL  This may have changed:   when to take this  reasons to take this  Used for: Diarrhea  Changed by: Jennifer GARNER      Dose: 1 tablet  Take 1 tablet by mouth every 8 hours as needed for diarrhea  Quantity: 30 tablet  Refills: 0            CONTINUE these medicines which have NOT CHANGED        Dose / Directions   acetaminophen 500 MG tablet  Commonly known as: TYLENOL      Dose: 1,000 mg  Take 1,000 mg by mouth every 6 hours as needed for mild pain  Refills: 0     aspirin 81 MG EC tablet      Dose: 81 mg  Take 81 mg by mouth daily  Refills: 0     atorvastatin 10 MG tablet  Commonly known as: LIPITOR      TAKE 1 TABLET BY MOUTH ONCE DAILY AT BEDTIME  Refills: 0     bisacodyl 10 MG suppository  Commonly known as: DULCOLAX      Dose: 10 mg  Place 10 mg rectally every 72 hours as needed for constipation  Refills: 0     Euthyrox 50 MCG tablet  Generic drug: levothyroxine      Dose: 1 tablet  Take 1 tablet by mouth daily  Refills: 0     Lidocaine 4 % Patch  Commonly known as: LIDOCARE      Dose: 1 patch  Place 1 patch onto the skin every 24 hours To prevent lidocaine toxicity, patient should be patch free for 12 hrs  daily.  Refills: 0     lidocaine-prilocaine 2.5-2.5 % external cream  Commonly known as: EMLA      Apply topically daily as needed for moderate pain  Refills: 0     Melatonin 10 MG Tabs tablet      Dose: 10 mg  Take 10 mg by mouth nightly as needed for sleep  Refills: 0     methocarbamol 750 MG tablet  Commonly known as: ROBAXIN      Dose: 750 mg  Take 750 mg by mouth 4 times daily as needed for muscle spasms  Refills: 0     metoprolol succinate ER 50 MG 24 hr tablet  Commonly known as: TOPROL XL      Dose: 100 mg  Take 100 mg by mouth daily  Refills: 0     omeprazole 20 MG EC tablet  Commonly known as: PriLOSEC OTC      Dose: 20 mg  Take 20 mg by mouth  Refills: 0     oxyBUTYnin 5 MG tablet  Commonly known as: DITROPAN      Dose: 5 mg  Take 5 mg by mouth 3 times daily  Refills: 0     oxyCODONE 5 MG tablet  Commonly known as: ROXICODONE      Dose: 10 mg  Take 10 mg by mouth every 4 hours as needed for severe pain  Refills: 0     PEDIATRIC MULTIPLE VIT-C-FA PO      Dose: 1 tablet  Take 1 tablet by mouth daily  Refills: 0     polyethylene glycol 17 g packet  Commonly known as: MIRALAX      Dose: 1 packet  Take 1 packet by mouth daily  Refills: 0     Crystal-Laeyda Rx 1 mg Tabs      Dose: 1 tablet  Take 1 tablet by mouth daily  Refills: 0     senna-docusate 8.6-50 MG tablet  Commonly known as: SENOKOT-S/PERICOLACE      Dose: 2 tablet  Take 2 tablets by mouth daily  Refills: 0     urea 20 % external cream  Commonly known as: GORMEL      Apply topically as needed  Refills: 0     warfarin ANTICOAGULANT 5 MG tablet  Commonly known as: COUMADIN      Dose: 5 mg  Take 5 mg by mouth daily  Refills: 0               Where to get your medicines        These medications were sent to [place], [place] - [address]  [address] [address]      Phone: [phone]   diphenoxylate-atropine 2.5-0.025 MG tablet         ASSESSMENT/PLAN  Encounter Diagnoses   Name Primary?      Physical deconditioning Yes     Encounter for current long-term use of anticoagulants      Pain management      Hypokalemia      Hypokalemia- Potassium low and replacement ordered, He will have a recheck lab in the AM     Physical deconditioning PT/OT    Dialysis Patient M/W/F dialysis port right upper extremity fistula left upper extremity    S/P ORIF left distal humerus and left patella follow up with Orthopedics pain management monitoring report for changes in CMS,     Pain Management -Scheduled Tylenol,Lidocaine patch Robaxin scheduled, Prn Oxycodone     Dry skin on Urea cream    Insomnia- PRN Melatonin    Atrial Fibrillation continue Metoprolol Succinate On Coumadin, monitor an adjust per INR;s    GERD On Omeprazole     BPH Home Oxybutynin    HDL On atorvastatin     Hypothyroidism on Levothyroxine TSH 7.39 on 8/10/24     Constipation medications adjusted resolved     Electronically signed by: Brittanie Milton CNP       Sincerely,        Brittanie Milton CNP

## 2024-08-19 NOTE — TELEPHONE ENCOUNTER
Telephone order given to nurse Mcmahon  Currently on 5mg of coumadin every day   Jennifer Murray RN on 8/19/2024 at 3:16 PM     ----- Message from Brittanie Milton sent at 8/19/2024  2:17 PM CDT -----  Same dose coumadin and INR 8/22  HGB and BMP  in the AM     Dialysis patient  Give KCL 40 meq x2 4 hours apart

## 2024-08-19 NOTE — PROGRESS NOTES
[facility] GERIATRIC SERVICES  Chief Complaint   Patient presents with    RECHECK     [facility] Medical Record Number:  [id number]  Place of Service where encounter took place:  [facility] ([facility]) [[id number]]  Code Status:  Full Code     HISTORY:      HPI:  Ijeoma Polk  is 74 year old (1950) undergoing physical and occupational therapy. Excerpted from records who was walking into his friend's house and tripped. Mechanical fall, no preceding dizziness. No loss of consciousness.  Imaging showed small volume subarachnoid hemorrhage, sternal fracture, right 6-8 rib fractures. Kcentra was given for reversal for INR of 6.0.      8/12- OR with Ortho and Vascular surgery.     Nephrology followed him while here. He dialyzed on MWF.  This is Dr. Toscano last assessment and plan:   Ijeoma Polk is a 74 yr. old male w/a PMHx of ESRD on HD, he presented for admission w/multiple injuries (subarachnoid hemorrhage, sternal fracture, rib fractures and left sided distal humerus fracture) sustained after a fall.       Today he is seen to review VS, Labs, routine visit.  Labs reviewed h and potassium low at 2.8, replacement ordered and he will have a recheck in the a.m.  Hemoglobin also noted to be 7.8 and this will also be monitored closely. He denied chest pain shortness of breath cough or congestion.  He is moving his bowels.  He reports his pain is controlled.  INR reviewed 2.44 up from 1.18.  Also with an ORIF left patella and left distal humerus fistula left upper extremity.    Per the notes  he was sent to the OR for a left thrombectomy of the fistula.  He does have a port right upper chest.   He was recently.  Bruising around his mouth and neck appears to be resolving.  He is with swelling left upper extremity.  Multiple right rib fractures encourage cough and deep breathe and I-S    ALLERGIES:Furosemide, Lorazepam, Penicillins, and Sulfadiazine    PAST MEDICAL HISTORY:   Past Medical History:   Diagnosis Date     Chronic diarrhea     End stage renal disease (H)     H/O gastric bypass     History of bowel resection     Hypertension, surgical complication 3/9/2009    Hypokalemia     Membranous nephropathy determined by biopsy     PAF (paroxysmal atrial fibrillation) (H)     Type 2 diabetes mellitus without complication, without long-term current use of insulin (H) 08/10/2022       PAST SURGICAL HISTORY:   has no past surgical history on file.    FAMILY HISTORY: family history includes Alcoholism in his father; Emphysema in his mother.    SOCIAL HISTORY:  reports that he has never smoked. He has never used smokeless tobacco. He reports that he does not currently use alcohol. He reports that he does not use drugs.    ROS:  Constitutional: Negative for activity change, appetite change, fatigue and fever.  Nonweightbearing left lower extremity and left upper extremity  HENT: Negative for congestion.    Respiratory: Negative for cough, shortness of breath and wheezing.  Pulmonary nodules per records  Cardiovascular: Negative for chest pain and leg swelling.  Dialysis port right upper chest  Gastrointestinal: Negative for abdominal distention, abdominal pain, constipation, diarrhea and nausea.   Genitourinary: Negative for dysuria.   Musculoskeletal: Negative for arthralgia. Negative for back pain.  ORIF left patella and left distal humerus none weightbearing left upper and lower extremity  Skin: Negative for color change and wound.  ecchymosis around his mouth resolving   Neurological: Negative for dizziness.   Psychiatric/Behavioral: Negative for agitation, behavioral problems and confusion.     Physical Exam:  Constitutional:       Appearance: Patient is well-developed.   HENT:      Head: Normocephalic.   Eyes:      Conjunctiva/sclera: Conjunctivae normal.   Neck:      Musculoskeletal: Normal range of motion.   Cardiovascular:      Rate and Rhythm: Normal rate and regular rhythm.      Heart sounds: Normal heart sounds. No murmur.    Pulmonary:      Effort: No respiratory distress.      Breath sounds: Normal breath sounds. No wheezing or rales.   Abdominal:      General: Bowel sounds are normal. There is no distension.      Palpations: Abdomen is soft.      Tenderness: There is no abdominal tenderness.   Musculoskeletal:       Normal range of motion.   Nonweightbearing left upper and lower extremity  Skin:General:        Skin is warm.   Neurological:         Mental Status: Patient is alert and oriented to person, place, and time.   Psychiatric:         Behavior: Behavior normal.     Vitals:/56   Pulse 85   Temp 97.1  F (36.2  C)   Resp 18   Ht 1.829 m (6')   Wt 80.3 kg (177 lb)   SpO2 96%   BMI 24.01 kg/m   and Body mass index is 24.01 kg/m .    Lab/Diagnostic data:   Recent Results (from the past 240 hour(s))   INR    Collection Time: 08/16/24  5:57 AM   Result Value Ref Range    INR 1.18 (H) 0.85 - 1.15   CBC with platelets    Collection Time: 08/19/24  5:54 AM   Result Value Ref Range    WBC Count 9.6 4.0 - 11.0 10e3/uL    RBC Count 2.37 (L) 4.40 - 5.90 10e6/uL    Hemoglobin 7.8 (L) 13.3 - 17.7 g/dL    Hematocrit 24.1 (L) 40.0 - 53.0 %     (H) 78 - 100 fL    MCH 32.9 26.5 - 33.0 pg    MCHC 32.4 31.5 - 36.5 g/dL    RDW 16.8 (H) 10.0 - 15.0 %    Platelet Count 229 150 - 450 10e3/uL   Magnesium    Collection Time: 08/19/24  5:54 AM   Result Value Ref Range    Magnesium 2.0 1.7 - 2.3 mg/dL   Glucose (OUTREACH)    Collection Time: 08/19/24  5:54 AM   Result Value Ref Range    Glucose 82 70 - 99 mg/dL   Basic Metabolic Panel No Glucose (OUTREACH)    Collection Time: 08/19/24  5:54 AM   Result Value Ref Range    Sodium 132 (L) 135 - 145 mmol/L    Potassium 2.8 (L) 3.4 - 5.3 mmol/L    Chloride 95 (L) 98 - 107 mmol/L    Carbon Dioxide (CO2) 26 22 - 29 mmol/L    Anion Gap 11 7 - 15 mmol/L    Urea Nitrogen 35.5 (H) 8.0 - 23.0 mg/dL    Creatinine 3.50 (H) 0.67 - 1.17 mg/dL    GFR Estimate 18 (L) >60 mL/min/1.73m2    Calcium 7.5 (L)  8.8 - 10.4 mg/dL   INR    Collection Time: 08/19/24  5:54 AM   Result Value Ref Range    INR 2.44 (H) 0.85 - 1.15        MEDICATIONS:  MED REC REQUIRED  Post Medication Reconciliation Status: discharge medications reconciled, continue medications without change          Review of your medicines            Accurate as of August 19, 2024  8:25 PM. If you have any questions, ask your nurse or doctor.                CONTINUE these medicines which may have CHANGED, or have new prescriptions. If we are uncertain of the size of tablets/capsules you have at home, strength may be listed as something that might have changed.        Dose / Directions   diphenoxylate-atropine 2.5-0.025 MG tablet  Commonly known as: LOMOTIL  This may have changed:   when to take this  reasons to take this  Used for: Diarrhea  Changed by: Jennifer GARNER      Dose: 1 tablet  Take 1 tablet by mouth every 8 hours as needed for diarrhea  Quantity: 30 tablet  Refills: 0            CONTINUE these medicines which have NOT CHANGED        Dose / Directions   acetaminophen 500 MG tablet  Commonly known as: TYLENOL      Dose: 1,000 mg  Take 1,000 mg by mouth every 6 hours as needed for mild pain  Refills: 0     aspirin 81 MG EC tablet      Dose: 81 mg  Take 81 mg by mouth daily  Refills: 0     atorvastatin 10 MG tablet  Commonly known as: LIPITOR      TAKE 1 TABLET BY MOUTH ONCE DAILY AT BEDTIME  Refills: 0     bisacodyl 10 MG suppository  Commonly known as: DULCOLAX      Dose: 10 mg  Place 10 mg rectally every 72 hours as needed for constipation  Refills: 0     Euthyrox 50 MCG tablet  Generic drug: levothyroxine      Dose: 1 tablet  Take 1 tablet by mouth daily  Refills: 0     Lidocaine 4 % Patch  Commonly known as: LIDOCARE      Dose: 1 patch  Place 1 patch onto the skin every 24 hours To prevent lidocaine toxicity, patient should be patch free for 12 hrs daily.  Refills: 0     lidocaine-prilocaine 2.5-2.5 % external cream  Commonly known as: EMLA      Apply  topically daily as needed for moderate pain  Refills: 0     Melatonin 10 MG Tabs tablet      Dose: 10 mg  Take 10 mg by mouth nightly as needed for sleep  Refills: 0     methocarbamol 750 MG tablet  Commonly known as: ROBAXIN      Dose: 750 mg  Take 750 mg by mouth 4 times daily as needed for muscle spasms  Refills: 0     metoprolol succinate ER 50 MG 24 hr tablet  Commonly known as: TOPROL XL      Dose: 100 mg  Take 100 mg by mouth daily  Refills: 0     omeprazole 20 MG EC tablet  Commonly known as: PriLOSEC OTC      Dose: 20 mg  Take 20 mg by mouth  Refills: 0     oxyBUTYnin 5 MG tablet  Commonly known as: DITROPAN      Dose: 5 mg  Take 5 mg by mouth 3 times daily  Refills: 0     oxyCODONE 5 MG tablet  Commonly known as: ROXICODONE      Dose: 10 mg  Take 10 mg by mouth every 4 hours as needed for severe pain  Refills: 0     PEDIATRIC MULTIPLE VIT-C-FA PO      Dose: 1 tablet  Take 1 tablet by mouth daily  Refills: 0     polyethylene glycol 17 g packet  Commonly known as: MIRALAX      Dose: 1 packet  Take 1 packet by mouth daily  Refills: 0     Crystal-Aleyda Rx 1 mg Tabs      Dose: 1 tablet  Take 1 tablet by mouth daily  Refills: 0     senna-docusate 8.6-50 MG tablet  Commonly known as: SENOKOT-S/PERICOLACE      Dose: 2 tablet  Take 2 tablets by mouth daily  Refills: 0     urea 20 % external cream  Commonly known as: GORMEL      Apply topically as needed  Refills: 0     warfarin ANTICOAGULANT 5 MG tablet  Commonly known as: COUMADIN      Dose: 5 mg  Take 5 mg by mouth daily  Refills: 0               Where to get your medicines        These medications were sent to [place], [place] - [address] [facility]  [address] [address]      Phone: [phone]   diphenoxylate-atropine 2.5-0.025 MG tablet         ASSESSMENT/PLAN  Encounter Diagnoses   Name Primary?    Physical deconditioning Yes    Encounter for current long-term use of anticoagulants     Pain management      Hypokalemia      Hypokalemia- Potassium low and replacement ordered, He will have a recheck lab in the AM     Physical deconditioning PT/OT    Dialysis Patient M/W/F dialysis port right upper extremity fistula left upper extremity    S/P ORIF left distal humerus and left patella follow up with Orthopedics pain management monitoring report for changes in CMS,     Pain Management -Scheduled Tylenol,Lidocaine patch Robaxin scheduled, Prn Oxycodone     Dry skin on Urea cream    Insomnia- PRN Melatonin    Atrial Fibrillation continue Metoprolol Succinate On Coumadin, monitor an adjust per INR;s    GERD On Omeprazole     BPH Home Oxybutynin    HDL On atorvastatin     Hypothyroidism on Levothyroxine TSH 7.39 on 8/10/24     Constipation medications adjusted resolved     Electronically signed by: Brittanie Milton CNP

## 2024-08-20 NOTE — TELEPHONE ENCOUNTER
[facility] Lab Note     Provider: GILL Morillo  Facility: [facility] ([facility]  Facility Type:  TCU    Allergies   Allergen Reactions    Furosemide Hives    Lorazepam Rash    Penicillins Rash    Sulfadiazine Rash       Labs Reviewed by provider: BMP, Hgb     Verbal Order/Direction given by Provider: BMP/CBC on Tuesday 8/27/24     Provider giving Order:  GILL Morillo    Verbal Order given to: Tania Silva RN

## 2024-08-21 NOTE — ED TRIAGE NOTES
Pt presents to the ED from dialysis where they were unable to stop the bleeding form his port/fistula for the past hour. Pt is post fall 1 week and has TCU placement. Stile is packed with gauze and bleeding through packing.       Triage Assessment (Adult)       Row Name 08/21/24 1454          Triage Assessment    Airway WDL WDL        Respiratory WDL    Respiratory WDL WDL        Skin Circulation/Temperature WDL    Skin Circulation/Temperature WDL WDL        Cardiac WDL    Cardiac WDL WDL        Peripheral/Neurovascular WDL    Peripheral Neurovascular WDL WDL        Cognitive/Neuro/Behavioral WDL    Cognitive/Neuro/Behavioral WDL WDL

## 2024-08-21 NOTE — ED NOTES
Bed: [facility]-06  Expected date: 8/21/24  Expected time: 2:45 PM  Means of arrival:   Comments:       related to increased metabolic demand of treatment

## 2024-08-21 NOTE — ED PROVIDER NOTES
EMERGENCY DEPARTMENT ENCOUNTER      NAME: Ijeoma Polk  AGE: 74 year old male  YOB: 1950  MRN: [id number]  EVALUATION DATE & TIME: 8/21/2024  2:49 PM    PCP: Outside, Provider    ED PROVIDER: Nicholas Kelly M.D.    Chief Complaint   Patient presents with    fistula bleeding       FINAL IMPRESSION:  1. Hemorrhage from dialysis catheter (H24)        ED COURSE & MEDICAL DECISION MAKING:    Pertinent Labs & Imaging studies independently interpreted by me. (See chart for details)    Reviewed most recent TCU visit from August 19 when patient was seen for routine rounding related to pain in the TCU with recent fall, noted that hemoglobin most recently was 7.8.  Also reviewed recent procedure from August 14 when tunneled dialysis port was placed.    ED Course as of 08/21/24 1630   Wed Aug 21, 2024   1536 Seen and examined, has bleeding from around his port after dialysis.  Patient is on Coumadin for atrial fibrillation.  On exam here, the dressing that was placed about 30 minutes ago has moderate blood, this was removed and there is trace venous oozing from the port site, no swelling around the subcutaneous portion of the port.  Will discuss with interventional radiology.  Patient is anticoagulated and has a history of anemia, labs are ordered.   1606 Procedure: hemostasis for port bleeding in the right upper chest.  Risk and benefits discussed with the patient.  Lidocaine 1% epinephrine was infused around the skin site of the port.  The area was prepped in sterile fashion with ChloraPrep.  A simple interrupted pursestring suture of 3-0 Ethilon was placed around the port with close attention to making sure that the suture was passed under the port and the subcutaneous portion.  This resulted in hemostasis.  A Surgicel dressing was placed as well.  Area was left with a sterile gauze in place and observed for further bleeding.  Patient tolerated this well   1610 Labs independently interpreted by me with  hemoglobin 8.8 which is stable for the patient   1628 Patient rechecked, no further bleeding from the fistula site.  INR is 2.99.  Dressing placed and patient is stable for discharge.     At the conclusion of the encounter I discussed the results of all of the tests and the disposition. The questions were answered. The patient or family acknowledged understanding and was agreeable with the care plan.     Medical Decision Making  Obtained supplemental history:Supplemental history obtained?: Other: dialysis nurse  Reviewed external records: External records reviewed?: Documented in chart  Care impacted by chronic illness:Anticoagulated State, Chronic Kidney Disease, Diabetes, and Hypertension  Care significantly affected by social determinants of health:Access to Affordable Health Care  Did you consider but not order tests?: Work up considered but not performed and documented in chart, if applicable  Did you interpret images independently?: Independent interpretation of ECG and images noted in documentation, when applicable.  Consultation discussion with other provider:Did you involve another provider (consultant, MH, pharmacy, etc.)?: I discussed the care with another health care provider, see documentation for details.  Discharge. I recommended the patient continue their current prescription strength medication(s): warfarin 5mg daily. N/A.  No MIPS measures identified.    MEDICATIONS GIVEN IN THE EMERGENCY:  Medications   oxyCODONE (ROXICODONE) tablet 10 mg (10 mg Oral $Given 8/21/24 1964)   oxidized cellulose (SURGICEL) pad 1 each (1 each Topical $Given 8/21/24 1609)       NEW PRESCRIPTIONS STARTED AT TODAY'S ER VISIT  New Prescriptions    No medications on file       =================================================================    HPI    Patient information was obtained from: Patient      Ijeoma Polk is a 74 year old male with a pertinent history of hyperlipidemia, hypertension, type 2 diabetes, end stage  renal disease, and paroxysmal atrial fibrillation who presents to this ED by ambulance for evaluation of  bleeding dialysis catheter.  Patient is currently TCU for broken left elbow, patellar fracture.  Because of the location of his elbow fracture near his previous dialysis fistula, a port was placed on August 14.  Today after dialysis, uncontrolled bleeding from around the port.      REVIEW OF SYSTEMS   Review of Systems   All other systems reviewed and negative    PAST MEDICAL HISTORY:  Past Medical History:   Diagnosis Date    Chronic diarrhea     End stage renal disease (H)     H/O gastric bypass     History of bowel resection     Hypertension, surgical complication 3/9/2009    Hypokalemia     Membranous nephropathy determined by biopsy     PAF (paroxysmal atrial fibrillation) (H)     Type 2 diabetes mellitus without complication, without long-term current use of insulin (H) 08/10/2022       PAST SURGICAL HISTORY:  No past surgical history on file.    CURRENT MEDICATIONS:    No current facility-administered medications for this encounter.     Current Outpatient Medications   Medication Sig Dispense Refill    acetaminophen (TYLENOL) 500 MG tablet Take 1,000 mg by mouth every 6 hours as needed for mild pain      aspirin 81 MG EC tablet Take 81 mg by mouth daily      atorvastatin (LIPITOR) 10 MG tablet TAKE 1 TABLET BY MOUTH ONCE DAILY AT BEDTIME      B Complex-C-Folic Acid (TATY-BRIT RX) 1 mg TABS Take 1 tablet by mouth daily      bisacodyl (DULCOLAX) 10 MG suppository Place 10 mg rectally every 72 hours as needed for constipation      diphenoxylate-atropine (LOMOTIL) 2.5-0.025 MG tablet Take 1 tablet by mouth every 8 hours as needed for diarrhea 30 tablet 0    levothyroxine (EUTHYROX) 50 MCG tablet Take 1 tablet by mouth daily      Lidocaine (LIDOCARE) 4 % Patch Place 1 patch onto the skin every 24 hours To prevent lidocaine toxicity, patient should be patch free for 12 hrs daily.      lidocaine-prilocaine  (EMLA) 2.5-2.5 % external cream Apply topically daily as needed for moderate pain      Melatonin 10 MG TABS tablet Take 10 mg by mouth nightly as needed for sleep      methocarbamol (ROBAXIN) 750 MG tablet Take 750 mg by mouth 4 times daily as needed for muscle spasms      metoprolol succinate ER (TOPROL XL) 50 MG 24 hr tablet Take 100 mg by mouth daily      omeprazole (PRILOSEC OTC) 20 MG EC tablet Take 20 mg by mouth      oxyBUTYnin (DITROPAN) 5 MG tablet Take 5 mg by mouth 3 times daily      oxyCODONE (ROXICODONE) 5 MG tablet Take 10 mg by mouth every 4 hours as needed for severe pain      PEDIATRIC MULTIPLE VIT-C-FA PO Take 1 tablet by mouth daily      polyethylene glycol (MIRALAX) 17 g packet Take 1 packet by mouth daily      senna-docusate (SENOKOT-S/PERICOLACE) 8.6-50 MG tablet Take 2 tablets by mouth daily      urea (GORMEL) 20 % external cream Apply topically as needed      warfarin ANTICOAGULANT (COUMADIN) 5 MG tablet Take 5 mg by mouth daily         ALLERGIES:  Allergies   Allergen Reactions    Furosemide Hives    Lorazepam Rash    Penicillins Rash    Sulfadiazine Rash       FAMILY HISTORY:  Family History   Problem Relation Age of Onset    Emphysema Mother     Alcoholism Father        SOCIAL HISTORY:   Social History     Socioeconomic History    Marital status:    Tobacco Use    Smoking status: Never    Smokeless tobacco: Never   Substance and Sexual Activity    Alcohol use: Not Currently    Drug use: Never     Social Determinants of Health     Financial Resource Strain: Low Risk  (2/9/2024)    Received from [facility]    Overall Financial Resource Strain (CARDIA)     Difficulty of Paying Living Expenses: Not hard at all   Food Insecurity: No Food Insecurity (8/9/2024)    Received from [facility] and [facility]    Hunger Vital Sign     Worried About Running Out of Food in the Last Year: Never true     Ran Out of Food in the Last Year: Never true   Transportation Needs: No  Transportation Needs (8/9/2024)    Received from [facility]    Transportation Needs     In the past 12 months, has lack of transportation kept you from medical appointments, meetings, work, or from getting medicines or things needed for daily living?: No   Physical Activity: Insufficiently Active (1/1/2024)    [facility]    Exercise Vital Sign     Days of Exercise per Week: 2 days     Minutes of Exercise per Session: 10 min   Stress: No Stress Concern Present (1/1/2024)    [facility]    [employer] [place] [employer] - Occupational Stress Questionnaire     Feeling of Stress : Not at all   Social Connections: Moderately Integrated (1/1/2024)    Received from [facility]    Social Connection and Isolation Panel [NHANES]     Frequency of Communication with Friends and Family: More than three times a week     Frequency of Social Gatherings with Friends and Family: More than three times a week     Attends Restorationism Services: More than 4 times per year     Active Member of Clubs or Organizations: Yes     Attends Club or Organization Meetings: More than 4 times per year     Marital Status:    Interpersonal Safety: Not At Risk (8/9/2024)    Received from [facility]    Intimate Partner Violence     Are you in a relationship where you are physically hurt, threatened and/or made to feel afraid?: No   Housing Stability: Low Risk  (8/9/2024)    Received from [facility]    Housing Stability Vital Sign     Unable to Pay for Housing in the Last Year: No     Number of Times Moved in the Last Year: 0     Homeless in the Last Year: No       VITALS:  BP (!) 138/90   Pulse 90    "Temp 97  F (36.1  C) (Temporal)   Resp 18   Ht 1.778 m (5' 10\")   Wt 80.3 kg (177 lb)   SpO2 99%   BMI 25.40 kg/m      PHYSICAL EXAM:  Physical Exam  Vitals and nursing note reviewed.   Constitutional:       Appearance: Normal appearance.   HENT:      Head: Normocephalic and atraumatic.      Right Ear: External ear normal.      Left Ear: External ear normal.      Nose: Nose normal.   Eyes:      Extraocular Movements: Extraocular movements intact.      Conjunctiva/sclera: Conjunctivae normal.      Pupils: Pupils are equal, round, and reactive to light.   Pulmonary:      Effort: Pulmonary effort is normal.   Musculoskeletal:         General: No swelling or deformity. Normal range of motion.      Cervical back: Normal range of motion.   Neurological:      General: No focal deficit present.      Mental Status: He is alert and oriented to person, place, and time. Mental status is at baseline.   Psychiatric:         Mood and Affect: Mood normal.         Behavior: Behavior normal.         Thought Content: Thought content normal.          LAB:  All pertinent labs reviewed and interpreted.  Results for orders placed or performed during the hospital encounter of 08/21/24   CBC (+ platelets, no diff)   Result Value Ref Range    WBC Count 11.9 (H) 4.0 - 11.0 10e3/uL    RBC Count 2.69 (L) 4.40 - 5.90 10e6/uL    Hemoglobin 8.8 (L) 13.3 - 17.7 g/dL    Hematocrit 26.0 (L) 40.0 - 53.0 %    MCV 97 78 - 100 fL    MCH 32.7 26.5 - 33.0 pg    MCHC 33.8 31.5 - 36.5 g/dL    RDW 16.8 (H) 10.0 - 15.0 %    Platelet Count 318 150 - 450 10e3/uL   Result Value Ref Range    INR 2.99 (H) 0.85 - 1.15       RADIOLOGY:  Reviewed all pertinent imaging. Please see official radiology report.  No orders to display       I, Reji Celeste, am serving as a scribe to document services personally performed by Dr. Kelly based on my observation and the provider's statements to me. I, Nicholas Kelly MD attest that Reji Celeste is acting in a " [facility], has observed my performance of the services and has documented them in accordance with my direction.    Nicholas Kelly M.D.  Emergency Medicine  [facility] EMERGENCY ROOM  [address] [id number] [phone]  Dept: [phone]       Nicholas Kelly MD  08/21/24 9334

## 2024-08-22 NOTE — ED NOTES
RN attempted to call direct phone number of [facility], phone number [phone]. No response.     RN updated the staff member working at [facility] that patient is en route back to [facility]. They expressed verbal understanding.

## 2024-08-22 NOTE — TELEPHONE ENCOUNTER
[facility] Geriatrics Triage Nurse INR     Provider: GILL Morillo  Facility: [facility]   Facility Type:  TCU    Caller: Darline  Call Back Number: [phone]  Reason for call: INR  Diagnosis/Goal: A. Fib    Date INR New Dose/Orders        8/13/24 1.4 2.5mg Q Wed and 5mg AOD.  Next INR 8/16/24.     8/16/24 1.18 Take 5mg daily.  Next INR 8/19/24.   8/19/24 2.44 Take 5mg daily.  Next INR 8/22/24.     8/21/24(ER visit) 2.99 5mg   8/22/24 4.85 Hold Warfarin on 8/22.  Check INR on 8/23 either at dialysis after his run or via [facility] lab.      Home dose:  2.5mg Q Wed and 5mg AOD.      Heparin/Lovenox:  No  Currently on ABX?: No  Other interacting medication:  EC ASA 81mg daily  Missed or refused doses: No    Verbal Order/Direction given by Provider: Hold Warfarin on 8/22.  Next INR on 8/23/24 either at dialysis after his dialysis run or via [facility] lab.      Provider Giving Order:  GILL Morillo    Verbal Order given to: Darline Woodard RN

## 2024-08-22 NOTE — ED NOTES
RN called  [facility], and they verified that patient is in TCU staying in room 2030. RN spoke with staff member that works on [employer]. Pt lives on [address]. Transport arranged.     RN attempted to call [facility] and update that patient will be discharged without a response.

## 2024-08-22 NOTE — TELEPHONE ENCOUNTER
[facility] Geriatrics Triage Nurse Telephone Encounter    Provider: GILL Morillo  Facility: [facility]  Facility Type:  TCU    Caller: Azam  Call Back Number: [phone]    Allergies:    Allergies   Allergen Reactions    Furosemide Hives    Lorazepam Rash    Penicillins Rash    Sulfadiazine Rash        Reason for call: Pt has unstageable wound along the spine on mid/upper back with some necrotic areas. Wound care team has evaluated it and informed nursing to update provider. Also has an open sore on coccyx.     Verbal Order/Direction given by Provider:   - Coccyx: Cleanse with wound cleanser and apply Mepilex or border foam dressing daily and PRN for saturation  - Have wound doc assess wound on mid/upper back and provider recommendation for appropriate dressing to be applied.    Provider giving Order:  GILL Morillo    Verbal Order given to: Azam Love RN

## 2024-08-23 NOTE — TELEPHONE ENCOUNTER
[facility] Geriatrics Triage Nurse INR     Provider: GILL Morillo  Facility: [facility]   Facility Type:  TCU    Caller: Lauryn  Call Back Number: [phone]  Reason for call: INR  Diagnosis/Goal: A. Fib    Date INR New Dose/Orders             8/13/24 1.4 2.5mg Q Wed and 5mg AOD.  Next INR 8/16/24.   8/16/24 1.18 Take 5mg daily.  Next INR 8/19/24.     8/19/24 2.44 Take Warfarin 5mg daily.  Next INR 8/22/24.     8/22/24 4.85 Hold Warfarin on 8/22.  Next INR 8/23/24.   8/23/24 5.93 Hold Warfarin on 8/23.  Next INR 8/24/24    Home dose:  2.5mg Q Wed and 5mg AOD    Note:  Patient does dialysis M-W-F    Heparin/Lovenox:  No  Currently on ABX?: No  Other interacting medication:  EC ASA 81mg daily  Missed or refused doses: No    Verbal Order/Direction given by Provider: Hold Warfarin on 8/23.  Next INR 8/24/24.      Provider Giving Order:  GILL Morillo    Verbal Order given to: Lauryn Woodard RN

## 2024-08-24 NOTE — TELEPHONE ENCOUNTER
INR = 5.36 today   Yesterday 5.93    Held coumadin.    Orders:  Hold coumadin x2 days  INR on Monday    RABIA De Anda CNP

## 2024-08-26 NOTE — TELEPHONE ENCOUNTER
Patient having low blood pressures the last few days, today 88/50s.   Orders: push fluids. Change metoprolol 50mg po daily.

## 2024-08-27 NOTE — RESULT ENCOUNTER NOTE
Orders given at facility: send BMP to dialysis for review of hypokalemia. Per patient, potassium runs low.

## 2024-08-27 NOTE — LETTER
" 8/27/2024      Ijeoma Polk  [address]  [facility] [address]         HEALTH GERIATRIC SERVICES  Chief Complaint   Patient presents with     RECHECK     [place] Medical Record Number:  [id number]  Place of Service where encounter took place:  [facility] ([facility]) [[id number]]  Code Status:  Full Code     HISTORY:      HPI:  Ijeoma has a history of ESRD on HD MWF, recent falls with small subarachnoid hemorrhage, sternal fracture, R 6-8 fractures, L distal humeral fx, L patellar fx, NWB- admission [facility] 8/9. Anemia with hgbs in the 7s. Was again seen in the ED 8/21 for bleeding from dialysis catheter.   Today: Nursing had alerted me to some low blood pressures- today 88/50; reviewed [facility] facility EMR and blood pressures range 100s-120s/60s. Yesterday reduced his metoprolol and placed parameters on this to hold for SBP <100. Today he had a BMP that returned with K 2.5; asked facility staff to alert [facility] nephrologist STAT. Ijeoma says \"my potassium is always low\". Will replace today cautiously and he will be seen again at dialysis tomorrow and per nursing \"we haven't heard back from dialysis yet\". Ijeoma is seen sitting up in bed. Blood pressures have been more stable today. He is frustrated with being in the TCU and \"time goes so slow here\". He tells me he is feeling better today, says he had dialysis yesterday. Faded bruising noted to around mouth and neck. He is using his IS. He does have a stage 2 pressure sore to his back and nursing is following this; he is unable to turn to show me this due to pain. I will attempt to view this in the facility EMR which should have documentation.   Reportedly, patient's family would like to discharge him soon.     ALLERGIES:Furosemide, Lorazepam, Penicillins, and Sulfadiazine    PAST MEDICAL HISTORY:   Past Medical History:   Diagnosis Date     Chronic diarrhea      End stage renal disease (H)      H/O gastric bypass      History of bowel resection      " Hypertension, surgical complication 3/9/2009     Hypokalemia      Membranous nephropathy determined by biopsy      PAF (paroxysmal atrial fibrillation) (H)      Type 2 diabetes mellitus without complication, without long-term current use of insulin (H) 08/10/2022       PAST SURGICAL HISTORY:   has no past surgical history on file.    FAMILY HISTORY: family history includes Alcoholism in his father; Emphysema in his mother.    SOCIAL HISTORY:  reports that he has never smoked. He has never used smokeless tobacco. He reports that he does not currently use alcohol. He reports that he does not use drugs.    ROS: Pertinent positives noted in HPI.    General appearance: alert, cooperative.   Lungs: respirations unlabored on room air, LL diminished.   Extremities: extremities normal, atraumatic, no cyanosis or edema  Skin: Bruising around mouth  Neurologic: oriented. No focal deficits.   Psych: interacts well with caregivers,  pleasant    Vitals:/61   Pulse 78   Temp 97.7  F (36.5  C)   Resp 16   Ht 1.829 m (6')   Wt 76.2 kg (168 lb)   SpO2 95%   BMI 22.78 kg/m   and Body mass index is 22.78 kg/m .    Lab/Diagnostic data:   Recent Results (from the past 240 hour(s))   CBC with platelets    Collection Time: 08/19/24  5:54 AM   Result Value Ref Range    WBC Count 9.6 4.0 - 11.0 10e3/uL    RBC Count 2.37 (L) 4.40 - 5.90 10e6/uL    Hemoglobin 7.8 (L) 13.3 - 17.7 g/dL    Hematocrit 24.1 (L) 40.0 - 53.0 %     (H) 78 - 100 fL    MCH 32.9 26.5 - 33.0 pg    MCHC 32.4 31.5 - 36.5 g/dL    RDW 16.8 (H) 10.0 - 15.0 %    Platelet Count 229 150 - 450 10e3/uL   Magnesium    Collection Time: 08/19/24  5:54 AM   Result Value Ref Range    Magnesium 2.0 1.7 - 2.3 mg/dL   Glucose (OUTREACH)    Collection Time: 08/19/24  5:54 AM   Result Value Ref Range    Glucose 82 70 - 99 mg/dL   Basic Metabolic Panel No Glucose (OUTREACH)    Collection Time: 08/19/24  5:54 AM   Result Value Ref Range    Sodium 132 (L) 135 - 145 mmol/L     Potassium 2.8 (L) 3.4 - 5.3 mmol/L    Chloride 95 (L) 98 - 107 mmol/L    Carbon Dioxide (CO2) 26 22 - 29 mmol/L    Anion Gap 11 7 - 15 mmol/L    Urea Nitrogen 35.5 (H) 8.0 - 23.0 mg/dL    Creatinine 3.50 (H) 0.67 - 1.17 mg/dL    GFR Estimate 18 (L) >60 mL/min/1.73m2    Calcium 7.5 (L) 8.8 - 10.4 mg/dL   INR    Collection Time: 08/19/24  5:54 AM   Result Value Ref Range    INR 2.44 (H) 0.85 - 1.15   Hemoglobin    Collection Time: 08/20/24  6:18 AM   Result Value Ref Range    Hemoglobin 8.1 (L) 13.3 - 17.7 g/dL   Glucose (OUTREACH)    Collection Time: 08/20/24  6:18 AM   Result Value Ref Range    Glucose 72 70 - 99 mg/dL   Basic Metabolic Panel No Glucose (OUTREACH)    Collection Time: 08/20/24  6:18 AM   Result Value Ref Range    Sodium 134 (L) 135 - 145 mmol/L    Potassium 3.4 3.4 - 5.3 mmol/L    Chloride 94 (L) 98 - 107 mmol/L    Carbon Dioxide (CO2) 31 (H) 22 - 29 mmol/L    Anion Gap 9 7 - 15 mmol/L    Urea Nitrogen 20.2 8.0 - 23.0 mg/dL    Creatinine 2.43 (H) 0.67 - 1.17 mg/dL    GFR Estimate 27 (L) >60 mL/min/1.73m2    Calcium 7.6 (L) 8.8 - 10.4 mg/dL   CBC (+ platelets, no diff)    Collection Time: 08/21/24  3:53 PM   Result Value Ref Range    WBC Count 11.9 (H) 4.0 - 11.0 10e3/uL    RBC Count 2.69 (L) 4.40 - 5.90 10e6/uL    Hemoglobin 8.8 (L) 13.3 - 17.7 g/dL    Hematocrit 26.0 (L) 40.0 - 53.0 %    MCV 97 78 - 100 fL    MCH 32.7 26.5 - 33.0 pg    MCHC 33.8 31.5 - 36.5 g/dL    RDW 16.8 (H) 10.0 - 15.0 %    Platelet Count 318 150 - 450 10e3/uL   INR    Collection Time: 08/21/24  3:53 PM   Result Value Ref Range    INR 2.99 (H) 0.85 - 1.15   INR    Collection Time: 08/22/24  8:27 AM   Result Value Ref Range    INR 4.85 (H) 0.85 - 1.15   INR    Collection Time: 08/23/24  9:37 AM   Result Value Ref Range    INR 5.93 (HH) 0.85 - 1.15   INR    Collection Time: 08/24/24  6:41 AM   Result Value Ref Range    INR 5.36 (HH) 0.85 - 1.15   INR    Collection Time: 08/26/24  7:15 AM   Result Value Ref Range    INR 6.41 (HH)  0.85 - 1.15   CBC with platelets    Collection Time: 08/27/24  7:28 AM   Result Value Ref Range    WBC Count 10.7 4.0 - 11.0 10e3/uL    RBC Count 2.45 (L) 4.40 - 5.90 10e6/uL    Hemoglobin 8.0 (L) 13.3 - 17.7 g/dL    Hematocrit 24.9 (L) 40.0 - 53.0 %     (H) 78 - 100 fL    MCH 32.7 26.5 - 33.0 pg    MCHC 32.1 31.5 - 36.5 g/dL    RDW 18.6 (H) 10.0 - 15.0 %    Platelet Count 333 150 - 450 10e3/uL   Glucose (OUTREACH)    Collection Time: 08/27/24  7:28 AM   Result Value Ref Range    Glucose 59 (L) 70 - 99 mg/dL   Basic Metabolic Panel No Glucose (OUTREACH)    Collection Time: 08/27/24  7:28 AM   Result Value Ref Range    Sodium 134 (L) 135 - 145 mmol/L    Potassium 2.5 (LL) 3.4 - 5.3 mmol/L    Chloride 92 (L) 98 - 107 mmol/L    Carbon Dioxide (CO2) 30 (H) 22 - 29 mmol/L    Anion Gap 12 7 - 15 mmol/L    Urea Nitrogen 28.9 (H) 8.0 - 23.0 mg/dL    Creatinine 3.30 (H) 0.67 - 1.17 mg/dL    GFR Estimate 19 (L) >60 mL/min/1.73m2    Calcium 7.3 (L) 8.8 - 10.4 mg/dL   INR    Collection Time: 08/28/24  7:06 AM   Result Value Ref Range    INR 5.12 () 0.85 - 1.15   ECG 12-LEAD WITH MUSE (LHE)    Collection Time: 08/28/24 12:18 PM   Result Value Ref Range    Systolic Blood Pressure 77 mmHg    Diastolic Blood Pressure 59 mmHg    Ventricular Rate 187 BPM    Atrial Rate 182 BPM    NH Interval  ms    QRS Duration 84 ms     ms    QTc 416 ms    P Axis  degrees    R AXIS 5 degrees    T Axis 192 degrees    Interpretation ECG       Atrial fibrillation with rapid ventricular response  Low voltage QRS  ST & T wave abnormality, consider anterior ischemia  Abnormal ECG  When compared with ECG of 01-Aug-2022 13:53,  Atrial fibrillation has replaced Sinus rhythm  Vent. rate has increased by 135 bpm  QRS duration has decreased  Nonspecific T wave abnormality, worse in Inferior leads  T wave inversion now evident in Anterior leads  Confirmed by SEE ED PROVIDER NOTE FOR, ECG INTERPRETATION ([id number]),  DAQUAN BANG ([id number]) on  8/28/2024 12:30:46 PM     Comprehensive metabolic panel    Collection Time: 08/28/24 12:31 PM   Result Value Ref Range    Sodium 132 (L) 135 - 145 mmol/L    Potassium 3.6 3.4 - 5.3 mmol/L    Carbon Dioxide (CO2) 21 (L) 22 - 29 mmol/L    Anion Gap 21 (H) 7 - 15 mmol/L    Urea Nitrogen 34.7 (H) 8.0 - 23.0 mg/dL    Creatinine 3.86 (H) 0.67 - 1.17 mg/dL    GFR Estimate 16 (L) >60 mL/min/1.73m2    Calcium 7.9 (L) 8.8 - 10.4 mg/dL    Chloride 90 (L) 98 - 107 mmol/L    Glucose 64 (L) 70 - 99 mg/dL    Alkaline Phosphatase 158 (H) 40 - 150 U/L    AST      ALT 21 0 - 70 U/L    Protein Total 5.2 (L) 6.4 - 8.3 g/dL    Albumin 2.8 (L) 3.5 - 5.2 g/dL    Bilirubin Total 0.6 <=1.2 mg/dL   INR    Collection Time: 08/28/24 12:31 PM   Result Value Ref Range    INR 4.58 (H) 0.85 - 1.15   Partial thromboplastin time    Collection Time: 08/28/24 12:31 PM   Result Value Ref Range    aPTT 30 22 - 38 Seconds   Blood gas venous    Collection Time: 08/28/24 12:31 PM   Result Value Ref Range    pH Venous 7.28 (L) 7.32 - 7.43    pCO2 Venous 56 (H) 40 - 50 mm Hg    pO2 Venous 23 (L) 25 - 47 mm Hg    Bicarbonate Venous 26 21 - 28 mmol/L    Base Excess/Deficit Venous -0.7 -3.0 - 3.0 mmol/L    FIO2 21     Oxyhemoglobin Venous 26 (L) 70 - 75 %    O2 Sat, Venous 26.5 (L) 70.0 - 75.0 %   Lactic acid whole blood    Collection Time: 08/28/24 12:31 PM   Result Value Ref Range    Lactic Acid 4.7 (HH) 0.7 - 2.0 mmol/L   Magnesium    Collection Time: 08/28/24 12:31 PM   Result Value Ref Range    Magnesium 2.0 1.7 - 2.3 mg/dL   TSH with free T4 reflex    Collection Time: 08/28/24 12:31 PM   Result Value Ref Range    TSH 16.20 (H) 0.30 - 4.20 uIU/mL   Nt probnp inpatient    Collection Time: 08/28/24 12:31 PM   Result Value Ref Range    N terminal Pro BNP Inpatient 39,340 (H) 0 - 900 pg/mL   Digoxin level    Collection Time: 08/28/24 12:31 PM   Result Value Ref Range    Digoxin 0.4 (L) 0.6 - 2.0 ng/mL   CBC with platelets and differential    Collection Time:  08/28/24 12:31 PM   Result Value Ref Range    WBC Count 12.7 (H) 4.0 - 11.0 10e3/uL    RBC Count 2.89 (L) 4.40 - 5.90 10e6/uL    Hemoglobin 9.5 (L) 13.3 - 17.7 g/dL    Hematocrit 29.0 (L) 40.0 - 53.0 %     78 - 100 fL    MCH 32.9 26.5 - 33.0 pg    MCHC 32.8 31.5 - 36.5 g/dL    RDW 19.0 (H) 10.0 - 15.0 %    Platelet Count 469 (H) 150 - 450 10e3/uL    % Neutrophils 92 %    % Lymphocytes 4 %    % Monocytes 4 %    % Eosinophils 0 %    % Basophils 0 %    % Immature Granulocytes 1 %    NRBCs per 100 WBC 0 <1 /100    Absolute Neutrophils 11.6 (H) 1.6 - 8.3 10e3/uL    Absolute Lymphocytes 0.5 (L) 0.8 - 5.3 10e3/uL    Absolute Monocytes 0.5 0.0 - 1.3 10e3/uL    Absolute Eosinophils 0.0 0.0 - 0.7 10e3/uL    Absolute Basophils 0.0 0.0 - 0.2 10e3/uL    Absolute Immature Granulocytes 0.1 <=0.4 10e3/uL    Absolute NRBCs 0.0 10e3/uL   Basic metabolic panel    Collection Time: 08/28/24 12:31 PM   Result Value Ref Range    Sodium 133 (L) 135 - 145 mmol/L    Potassium 3.3 (L) 3.4 - 5.3 mmol/L    Chloride 90 (L) 98 - 107 mmol/L    Carbon Dioxide (CO2) 21 (L) 22 - 29 mmol/L    Anion Gap 22 (H) 7 - 15 mmol/L    Urea Nitrogen 36.0 (H) 8.0 - 23.0 mg/dL    Creatinine 3.99 (H) 0.67 - 1.17 mg/dL    GFR Estimate 15 (L) >60 mL/min/1.73m2    Calcium 7.4 (L) 8.8 - 10.4 mg/dL    Glucose 98 70 - 99 mg/dL   Glucose by meter    Collection Time: 08/28/24 12:48 PM   Result Value Ref Range    GLUCOSE BY METER POCT 37 (LL) 70 - 99 mg/dL   Glucose by meter    Collection Time: 08/28/24  1:01 PM   Result Value Ref Range    GLUCOSE BY METER POCT 66 (L) 70 - 99 mg/dL   Glucose by meter    Collection Time: 08/28/24  1:30 PM   Result Value Ref Range    GLUCOSE BY METER POCT 44 (LL) 70 - 99 mg/dL        MEDICATIONS:  MED REC REQUIRED  Post Medication Reconciliation Status: discharge medications reconciled, continue medications without change          Review of your medicines            Accurate as of August 27, 2024 11:59 PM. If you have any questions,  ask your nurse or doctor.                CONTINUE these medicines which have NOT CHANGED        Dose / Directions   aspirin 81 MG EC tablet      Dose: 81 mg  Take 81 mg by mouth 2 times daily.  Refills: 0     atorvastatin 10 MG tablet  Commonly known as: LIPITOR      TAKE 1 TABLET BY MOUTH ONCE DAILY AT BEDTIME  Refills: 0     bisacodyl 10 MG suppository  Commonly known as: DULCOLAX      Dose: 10 mg  Place 10 mg rectally every 72 hours as needed for constipation  Refills: 0     diphenoxylate-atropine 2.5-0.025 MG tablet  Commonly known as: LOMOTIL  Used for: Diarrhea      Dose: 1 tablet  Take 1 tablet by mouth every 8 hours as needed for diarrhea  Quantity: 30 tablet  Refills: 0     Euthyrox 50 MCG tablet  Generic drug: levothyroxine      Dose: 1 tablet  Take 1 tablet by mouth daily  Refills: 0     lidocaine-prilocaine 2.5-2.5 % external cream  Commonly known as: EMLA      Apply topically daily as needed for moderate pain  Refills: 0     Melatonin 10 MG Tabs tablet      Dose: 10 mg  Take 10 mg by mouth nightly as needed for sleep  Refills: 0     metoprolol succinate ER 50 MG 24 hr tablet  Commonly known as: TOPROL XL      Dose: 100 mg  Take 100 mg by mouth daily. Hold for SBP <100  Refills: 0     omeprazole 20 MG EC tablet  Commonly known as: PriLOSEC OTC      Dose: 20 mg  Take 20 mg by mouth 2 times daily.  Refills: 0     oxyCODONE 5 MG tablet  Commonly known as: ROXICODONE      Dose: 10 mg  Take 2 tablets (10 mg) by mouth every 4 hours as needed for severe pain.  Quantity: 40 tablet  Refills: 0     polyethylene glycol 17 g packet  Commonly known as: MIRALAX      Dose: 1 packet  Take 1 packet by mouth daily  Refills: 0     Crystal-Aleyda Rx 1 mg Tabs      Dose: 1 tablet  Take 1 tablet by mouth daily  Refills: 0     senna-docusate 8.6-50 MG tablet  Commonly known as: SENOKOT-S/PERICOLACE      Dose: 2 tablet  Take 2 tablets by mouth daily  Refills: 0              ASSESSMENT/PLAN  Encounter Diagnoses   Name Primary?      Hemodialysis-associated hypotension Yes     Varicose ulcer of lower extremity, right (H)      Anemia in chronic kidney disease, on chronic dialysis (H)      Type 2 diabetes mellitus without complication, without long-term current use of insulin (H)      Closed bicondylar fracture of distal end of left humerus with routine healing, subsequent encounter      ESRD (end stage renal disease) (H)      Closed fracture of distal end of left humerus, initial encounter      Hypertension: Hypotension intermittently in TCU as low as 88/50s- rechecks have been >100. Decrease metoprolol to 50 daily, HOLD for SBP <100. Push fluids.     Hypokalemia- K 2.5; facility to update nephrologist stat. Did not hear back so did order KCL 20meq po x 1, Dialysis tomorrow.     Physical deconditioning PT/OT    Dialysis Patient M/W/F dialysis port right upper extremity fistula left upper extremity    S/P ORIF left distal humerus and left patella follow up with Orthopedics pain management monitoring report for changes in CMS,     Pain Management -Scheduled Tylenol,Lidocaine patch Robaxin scheduled, Prn Oxycodone     Dry skin on Urea cream    Insomnia- PRN Melatonin    Atrial Fibrillation continue Metoprolol Succinate; INR elevated 8/26, on HOLDx 48 hours. Recheck 8/28.     GERD On Omeprazole     BPH Home Oxybutynin    HDL On atorvastatin       Electronically signed by: Nila Jamison CNP       Sincerely,        Nila Jamison, CNP

## 2024-08-28 PROBLEM — L97.919: Status: ACTIVE | Noted: 2024-01-01

## 2024-08-28 PROBLEM — N18.6 ESRD ON HEMODIALYSIS (H): Status: ACTIVE | Noted: 2024-01-01

## 2024-08-28 PROBLEM — I48.91 ATRIAL FIBRILLATION WITH RAPID VENTRICULAR RESPONSE (H): Status: ACTIVE | Noted: 2024-01-01

## 2024-08-28 PROBLEM — I83.019: Status: ACTIVE | Noted: 2024-01-01

## 2024-08-28 PROBLEM — I95.9 TRANSIENT HYPOTENSION: Status: ACTIVE | Noted: 2024-01-01

## 2024-08-28 PROBLEM — Z99.2 ESRD ON HEMODIALYSIS (H): Status: ACTIVE | Noted: 2024-01-01

## 2024-08-28 PROBLEM — R79.1 SUPRATHERAPEUTIC INR: Status: ACTIVE | Noted: 2024-01-01

## 2024-08-28 PROBLEM — E16.2 HYPOGLYCEMIA: Status: ACTIVE | Noted: 2024-01-01

## 2024-08-28 PROBLEM — E87.20 LACTIC ACIDOSIS: Status: ACTIVE | Noted: 2024-01-01

## 2024-08-28 NOTE — ED TRIAGE NOTES
Pt arrives via EMS for hypotension prior to dialysis run today. Had a fall a few weeks ago and is at [facility] TCU. Has a broken left arm and left knee. Caleb lift. He denies symptoms at this time. The TCU had a blood pressure of 50s/40s, EMS had a reading of 160s/70s.

## 2024-08-28 NOTE — PHARMACY-VANCOMYCIN DOSING SERVICE
Pharmacy Vancomycin Initial Note  Date of Service 2024  Patient's  1950  74 year old, male    Indication: Sepsis    Current estimated CrCl = Estimated Creatinine Clearance: 18.1 mL/min (A) (based on SCr of 3.86 mg/dL (H)).    Creatinine for last 3 days  2024:  7:28 AM Creatinine 3.30 mg/dL  2024: 12:31 PM Creatinine 3.86 mg/dL; 12:31 PM Creatinine 3.99 mg/dL    Recent Vancomycin Level(s) for last 3 days  No results found for requested labs within last 3 days.      Vancomycin IV Administrations (past 72 hours)                     vancomycin (VANCOCIN) 1,500 mg in 0.9% NaCl 250 mL intermittent infusion (mg) 1,500 mg New Bag 24 154                    Nephrotoxins and other renal medications (From now, onward)      None            Contrast Orders - past 72 hours (72h ago, onward)      None                  Plan:  Already received IV vanco load of 1500mg x1 as ordered by ED provider. Will obtain pre-HD level on 24 and plan dose after patient gets HD run as per policy.  Vancomycin monitoring method: Renal Replacement Therapy  Vancomycin therapeutic monitoring goal: 15-20 mg/L  Pharmacy will check vancomycin levels as appropriate in 1-3 Days.    Serum creatinine levels will be ordered daily for the first week of therapy and at least twice weekly for subsequent weeks.      Olga Brock, PharmD

## 2024-08-28 NOTE — ED NOTES
[phone]  Patient is alert and oriented x4. Denies pain at rest but has pain with mobility. Patient's heart rate was controlled after he was given metoprolol. However around 1830 patient has episodes of tachycardia with runs of vtach. Blood pressure improved 111/64. Paged primary MD new orders to administer PTA metoprolol 50 mg XL early, cardiology consult. Report given to receiving RN on 3rd floor and patient transferred.   /64   Pulse 108   Temp 97.5  F (36.4  C) (Oral)   Resp 21   SpO2 100%   aRmakrishna Romo RN  8/28/2024  6:51 PM

## 2024-08-28 NOTE — PHARMACY-VANCOMYCIN DOSING SERVICE
Pharmacy Vancomycin Initial Note  Date of Service 2024  Patient's  1950  74 year old, male    Indication: Sepsis    Current estimated CrCl = Estimated Creatinine Clearance: 18.1 mL/min (A) (based on SCr of 3.86 mg/dL (H)).    Creatinine for last 3 days  2024:  7:28 AM Creatinine 3.30 mg/dL  2024: 12:31 PM Creatinine 3.86 mg/dL; 12:31 PM Creatinine 3.99 mg/dL    Recent Vancomycin Level(s) for last 3 days  No results found for requested labs within last 3 days.      Vancomycin IV Administrations (past 72 hours)        No vancomycin orders with administrations in past 72 hours.                    Nephrotoxins and other renal medications (From now, onward)      None            Contrast Orders - past 72 hours (72h ago, onward)      None            Plan:  Give vancomycin  1500 mg IV once.   If vancomycin is to be continued, please re-consult pharmacy to dose.    Ashwini Mina RPH
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
## 2024-08-28 NOTE — PROGRESS NOTES
"[facility] GERIATRIC SERVICES  Chief Complaint   Patient presents with    FRANKLIN     [facility] Medical Record Number:  [id number]  Place of Service where encounter took place:  [facility] ([facility]) [[id number]]  Code Status:  Full Code     HISTORY:      HPI:  Ijeoma has a history of ESRD on HD MWF, recent falls with small subarachnoid hemorrhage, sternal fracture, R 6-8 fractures, L distal humeral fx, L patellar fx, NWB- admission [facility] 8/9. Anemia with hgbs in the 7s. Was again seen in the ED 8/21 for bleeding from dialysis catheter.   Today: Nursing had alerted me to some low blood pressures- today 88/50; reviewed [facility] facility EMR and blood pressures range 100s-120s/60s. Yesterday reduced his metoprolol and placed parameters on this to hold for SBP <100. Today he had a BMP that returned with K 2.5; asked facility staff to alert [facility] nephrologist STAT. Ijeoma says \"my potassium is always low\". Will replace today cautiously and he will be seen again at dialysis tomorrow and per nursing \"we haven't heard back from dialysis yet\". Ijeoma is seen sitting up in bed. Blood pressures have been more stable today. He is frustrated with being in the TCU and \"time goes so slow here\". He tells me he is feeling better today, says he had dialysis yesterday. Faded bruising noted to around mouth and neck. He is using his IS. He does have a stage 2 pressure sore to his back and nursing is following this; he is unable to turn to show me this due to pain. I will attempt to view this in the facility EMR which should have documentation.   Reportedly, patient's family would like to discharge him soon.     ALLERGIES:Furosemide, Lorazepam, Penicillins, and Sulfadiazine    PAST MEDICAL HISTORY:   Past Medical History:   Diagnosis Date    Chronic diarrhea     End stage renal disease (H)     H/O gastric bypass     History of bowel resection     Hypertension, surgical complication 3/9/2009    Hypokalemia     Membranous nephropathy " determined by biopsy     PAF (paroxysmal atrial fibrillation) (H)     Type 2 diabetes mellitus without complication, without long-term current use of insulin (H) 08/10/2022       PAST SURGICAL HISTORY:   has no past surgical history on file.    FAMILY HISTORY: family history includes Alcoholism in his father; Emphysema in his mother.    SOCIAL HISTORY:  reports that he has never smoked. He has never used smokeless tobacco. He reports that he does not currently use alcohol. He reports that he does not use drugs.    ROS: Pertinent positives noted in HPI.    General appearance: alert, cooperative.   Lungs: respirations unlabored on room air, LL diminished.   Extremities: extremities normal, atraumatic, no cyanosis or edema  Skin: Bruising around mouth  Neurologic: oriented. No focal deficits.   Psych: interacts well with caregivers,  pleasant    Vitals:/61   Pulse 78   Temp 97.7  F (36.5  C)   Resp 16   Ht 1.829 m (6')   Wt 76.2 kg (168 lb)   SpO2 95%   BMI 22.78 kg/m   and Body mass index is 22.78 kg/m .    Lab/Diagnostic data:   Recent Results (from the past 240 hour(s))   CBC with platelets    Collection Time: 08/19/24  5:54 AM   Result Value Ref Range    WBC Count 9.6 4.0 - 11.0 10e3/uL    RBC Count 2.37 (L) 4.40 - 5.90 10e6/uL    Hemoglobin 7.8 (L) 13.3 - 17.7 g/dL    Hematocrit 24.1 (L) 40.0 - 53.0 %     (H) 78 - 100 fL    MCH 32.9 26.5 - 33.0 pg    MCHC 32.4 31.5 - 36.5 g/dL    RDW 16.8 (H) 10.0 - 15.0 %    Platelet Count 229 150 - 450 10e3/uL   Magnesium    Collection Time: 08/19/24  5:54 AM   Result Value Ref Range    Magnesium 2.0 1.7 - 2.3 mg/dL   Glucose (OUTREACH)    Collection Time: 08/19/24  5:54 AM   Result Value Ref Range    Glucose 82 70 - 99 mg/dL   Basic Metabolic Panel No Glucose (OUTREACH)    Collection Time: 08/19/24  5:54 AM   Result Value Ref Range    Sodium 132 (L) 135 - 145 mmol/L    Potassium 2.8 (L) 3.4 - 5.3 mmol/L    Chloride 95 (L) 98 - 107 mmol/L    Carbon Dioxide  (CO2) 26 22 - 29 mmol/L    Anion Gap 11 7 - 15 mmol/L    Urea Nitrogen 35.5 (H) 8.0 - 23.0 mg/dL    Creatinine 3.50 (H) 0.67 - 1.17 mg/dL    GFR Estimate 18 (L) >60 mL/min/1.73m2    Calcium 7.5 (L) 8.8 - 10.4 mg/dL   INR    Collection Time: 08/19/24  5:54 AM   Result Value Ref Range    INR 2.44 (H) 0.85 - 1.15   Hemoglobin    Collection Time: 08/20/24  6:18 AM   Result Value Ref Range    Hemoglobin 8.1 (L) 13.3 - 17.7 g/dL   Glucose (OUTREACH)    Collection Time: 08/20/24  6:18 AM   Result Value Ref Range    Glucose 72 70 - 99 mg/dL   Basic Metabolic Panel No Glucose (OUTREACH)    Collection Time: 08/20/24  6:18 AM   Result Value Ref Range    Sodium 134 (L) 135 - 145 mmol/L    Potassium 3.4 3.4 - 5.3 mmol/L    Chloride 94 (L) 98 - 107 mmol/L    Carbon Dioxide (CO2) 31 (H) 22 - 29 mmol/L    Anion Gap 9 7 - 15 mmol/L    Urea Nitrogen 20.2 8.0 - 23.0 mg/dL    Creatinine 2.43 (H) 0.67 - 1.17 mg/dL    GFR Estimate 27 (L) >60 mL/min/1.73m2    Calcium 7.6 (L) 8.8 - 10.4 mg/dL   CBC (+ platelets, no diff)    Collection Time: 08/21/24  3:53 PM   Result Value Ref Range    WBC Count 11.9 (H) 4.0 - 11.0 10e3/uL    RBC Count 2.69 (L) 4.40 - 5.90 10e6/uL    Hemoglobin 8.8 (L) 13.3 - 17.7 g/dL    Hematocrit 26.0 (L) 40.0 - 53.0 %    MCV 97 78 - 100 fL    MCH 32.7 26.5 - 33.0 pg    MCHC 33.8 31.5 - 36.5 g/dL    RDW 16.8 (H) 10.0 - 15.0 %    Platelet Count 318 150 - 450 10e3/uL   INR    Collection Time: 08/21/24  3:53 PM   Result Value Ref Range    INR 2.99 (H) 0.85 - 1.15   INR    Collection Time: 08/22/24  8:27 AM   Result Value Ref Range    INR 4.85 (H) 0.85 - 1.15   INR    Collection Time: 08/23/24  9:37 AM   Result Value Ref Range    INR 5.93 (HH) 0.85 - 1.15   INR    Collection Time: 08/24/24  6:41 AM   Result Value Ref Range    INR 5.36 (HH) 0.85 - 1.15   INR    Collection Time: 08/26/24  7:15 AM   Result Value Ref Range    INR 6.41 (HH) 0.85 - 1.15   CBC with platelets    Collection Time: 08/27/24  7:28 AM   Result Value Ref  Range    WBC Count 10.7 4.0 - 11.0 10e3/uL    RBC Count 2.45 (L) 4.40 - 5.90 10e6/uL    Hemoglobin 8.0 (L) 13.3 - 17.7 g/dL    Hematocrit 24.9 (L) 40.0 - 53.0 %     (H) 78 - 100 fL    MCH 32.7 26.5 - 33.0 pg    MCHC 32.1 31.5 - 36.5 g/dL    RDW 18.6 (H) 10.0 - 15.0 %    Platelet Count 333 150 - 450 10e3/uL   Glucose (OUTREACH)    Collection Time: 08/27/24  7:28 AM   Result Value Ref Range    Glucose 59 (L) 70 - 99 mg/dL   Basic Metabolic Panel No Glucose (OUTREACH)    Collection Time: 08/27/24  7:28 AM   Result Value Ref Range    Sodium 134 (L) 135 - 145 mmol/L    Potassium 2.5 (LL) 3.4 - 5.3 mmol/L    Chloride 92 (L) 98 - 107 mmol/L    Carbon Dioxide (CO2) 30 (H) 22 - 29 mmol/L    Anion Gap 12 7 - 15 mmol/L    Urea Nitrogen 28.9 (H) 8.0 - 23.0 mg/dL    Creatinine 3.30 (H) 0.67 - 1.17 mg/dL    GFR Estimate 19 (L) >60 mL/min/1.73m2    Calcium 7.3 (L) 8.8 - 10.4 mg/dL   INR    Collection Time: 08/28/24  7:06 AM   Result Value Ref Range    INR 5.12 (HH) 0.85 - 1.15   ECG 12-LEAD WITH MUSE (LHE)    Collection Time: 08/28/24 12:18 PM   Result Value Ref Range    Systolic Blood Pressure 77 mmHg    Diastolic Blood Pressure 59 mmHg    Ventricular Rate 187 BPM    Atrial Rate 182 BPM    OH Interval  ms    QRS Duration 84 ms     ms    QTc 416 ms    P Axis  degrees    R AXIS 5 degrees    T Axis 192 degrees    Interpretation ECG       Atrial fibrillation with rapid ventricular response  Low voltage QRS  ST & T wave abnormality, consider anterior ischemia  Abnormal ECG  When compared with ECG of 01-Aug-2022 13:53,  Atrial fibrillation has replaced Sinus rhythm  Vent. rate has increased by 135 bpm  QRS duration has decreased  Nonspecific T wave abnormality, worse in Inferior leads  T wave inversion now evident in Anterior leads  Confirmed by SEE ED PROVIDER NOTE FOR, ECG INTERPRETATION (4000),  DAQUAN BANG ([id number]) on 8/28/2024 12:30:46 PM     Comprehensive metabolic panel    Collection Time: 08/28/24 12:31  PM   Result Value Ref Range    Sodium 132 (L) 135 - 145 mmol/L    Potassium 3.6 3.4 - 5.3 mmol/L    Carbon Dioxide (CO2) 21 (L) 22 - 29 mmol/L    Anion Gap 21 (H) 7 - 15 mmol/L    Urea Nitrogen 34.7 (H) 8.0 - 23.0 mg/dL    Creatinine 3.86 (H) 0.67 - 1.17 mg/dL    GFR Estimate 16 (L) >60 mL/min/1.73m2    Calcium 7.9 (L) 8.8 - 10.4 mg/dL    Chloride 90 (L) 98 - 107 mmol/L    Glucose 64 (L) 70 - 99 mg/dL    Alkaline Phosphatase 158 (H) 40 - 150 U/L    AST      ALT 21 0 - 70 U/L    Protein Total 5.2 (L) 6.4 - 8.3 g/dL    Albumin 2.8 (L) 3.5 - 5.2 g/dL    Bilirubin Total 0.6 <=1.2 mg/dL   INR    Collection Time: 08/28/24 12:31 PM   Result Value Ref Range    INR 4.58 (H) 0.85 - 1.15   Partial thromboplastin time    Collection Time: 08/28/24 12:31 PM   Result Value Ref Range    aPTT 30 22 - 38 Seconds   Blood gas venous    Collection Time: 08/28/24 12:31 PM   Result Value Ref Range    pH Venous 7.28 (L) 7.32 - 7.43    pCO2 Venous 56 (H) 40 - 50 mm Hg    pO2 Venous 23 (L) 25 - 47 mm Hg    Bicarbonate Venous 26 21 - 28 mmol/L    Base Excess/Deficit Venous -0.7 -3.0 - 3.0 mmol/L    FIO2 21     Oxyhemoglobin Venous 26 (L) 70 - 75 %    O2 Sat, Venous 26.5 (L) 70.0 - 75.0 %   Lactic acid whole blood    Collection Time: 08/28/24 12:31 PM   Result Value Ref Range    Lactic Acid 4.7 (HH) 0.7 - 2.0 mmol/L   Magnesium    Collection Time: 08/28/24 12:31 PM   Result Value Ref Range    Magnesium 2.0 1.7 - 2.3 mg/dL   TSH with free T4 reflex    Collection Time: 08/28/24 12:31 PM   Result Value Ref Range    TSH 16.20 (H) 0.30 - 4.20 uIU/mL   Nt probnp inpatient    Collection Time: 08/28/24 12:31 PM   Result Value Ref Range    N terminal Pro BNP Inpatient 39,340 (H) 0 - 900 pg/mL   Digoxin level    Collection Time: 08/28/24 12:31 PM   Result Value Ref Range    Digoxin 0.4 (L) 0.6 - 2.0 ng/mL   CBC with platelets and differential    Collection Time: 08/28/24 12:31 PM   Result Value Ref Range    WBC Count 12.7 (H) 4.0 - 11.0 10e3/uL    RBC  Count 2.89 (L) 4.40 - 5.90 10e6/uL    Hemoglobin 9.5 (L) 13.3 - 17.7 g/dL    Hematocrit 29.0 (L) 40.0 - 53.0 %     78 - 100 fL    MCH 32.9 26.5 - 33.0 pg    MCHC 32.8 31.5 - 36.5 g/dL    RDW 19.0 (H) 10.0 - 15.0 %    Platelet Count 469 (H) 150 - 450 10e3/uL    % Neutrophils 92 %    % Lymphocytes 4 %    % Monocytes 4 %    % Eosinophils 0 %    % Basophils 0 %    % Immature Granulocytes 1 %    NRBCs per 100 WBC 0 <1 /100    Absolute Neutrophils 11.6 (H) 1.6 - 8.3 10e3/uL    Absolute Lymphocytes 0.5 (L) 0.8 - 5.3 10e3/uL    Absolute Monocytes 0.5 0.0 - 1.3 10e3/uL    Absolute Eosinophils 0.0 0.0 - 0.7 10e3/uL    Absolute Basophils 0.0 0.0 - 0.2 10e3/uL    Absolute Immature Granulocytes 0.1 <=0.4 10e3/uL    Absolute NRBCs 0.0 10e3/uL   Basic metabolic panel    Collection Time: 08/28/24 12:31 PM   Result Value Ref Range    Sodium 133 (L) 135 - 145 mmol/L    Potassium 3.3 (L) 3.4 - 5.3 mmol/L    Chloride 90 (L) 98 - 107 mmol/L    Carbon Dioxide (CO2) 21 (L) 22 - 29 mmol/L    Anion Gap 22 (H) 7 - 15 mmol/L    Urea Nitrogen 36.0 (H) 8.0 - 23.0 mg/dL    Creatinine 3.99 (H) 0.67 - 1.17 mg/dL    GFR Estimate 15 (L) >60 mL/min/1.73m2    Calcium 7.4 (L) 8.8 - 10.4 mg/dL    Glucose 98 70 - 99 mg/dL   Glucose by meter    Collection Time: 08/28/24 12:48 PM   Result Value Ref Range    GLUCOSE BY METER POCT 37 (LL) 70 - 99 mg/dL   Glucose by meter    Collection Time: 08/28/24  1:01 PM   Result Value Ref Range    GLUCOSE BY METER POCT 66 (L) 70 - 99 mg/dL   Glucose by meter    Collection Time: 08/28/24  1:30 PM   Result Value Ref Range    GLUCOSE BY METER POCT 44 (LL) 70 - 99 mg/dL        MEDICATIONS:  MED REC REQUIRED  Post Medication Reconciliation Status: discharge medications reconciled, continue medications without change          Review of your medicines            Accurate as of August 27, 2024 11:59 PM. If you have any questions, ask your nurse or doctor.                CONTINUE these medicines which have NOT CHANGED         Dose / Directions   aspirin 81 MG EC tablet      Dose: 81 mg  Take 81 mg by mouth 2 times daily.  Refills: 0     atorvastatin 10 MG tablet  Commonly known as: LIPITOR      TAKE 1 TABLET BY MOUTH ONCE DAILY AT BEDTIME  Refills: 0     bisacodyl 10 MG suppository  Commonly known as: DULCOLAX      Dose: 10 mg  Place 10 mg rectally every 72 hours as needed for constipation  Refills: 0     diphenoxylate-atropine 2.5-0.025 MG tablet  Commonly known as: LOMOTIL  Used for: Diarrhea      Dose: 1 tablet  Take 1 tablet by mouth every 8 hours as needed for diarrhea  Quantity: 30 tablet  Refills: 0     Euthyrox 50 MCG tablet  Generic drug: levothyroxine      Dose: 1 tablet  Take 1 tablet by mouth daily  Refills: 0     lidocaine-prilocaine 2.5-2.5 % external cream  Commonly known as: EMLA      Apply topically daily as needed for moderate pain  Refills: 0     Melatonin 10 MG Tabs tablet      Dose: 10 mg  Take 10 mg by mouth nightly as needed for sleep  Refills: 0     metoprolol succinate ER 50 MG 24 hr tablet  Commonly known as: TOPROL XL      Dose: 100 mg  Take 100 mg by mouth daily. Hold for SBP <100  Refills: 0     omeprazole 20 MG EC tablet  Commonly known as: PriLOSEC OTC      Dose: 20 mg  Take 20 mg by mouth 2 times daily.  Refills: 0     oxyCODONE 5 MG tablet  Commonly known as: ROXICODONE      Dose: 10 mg  Take 2 tablets (10 mg) by mouth every 4 hours as needed for severe pain.  Quantity: 40 tablet  Refills: 0     polyethylene glycol 17 g packet  Commonly known as: MIRALAX      Dose: 1 packet  Take 1 packet by mouth daily  Refills: 0     Crystal-Aleyda Rx 1 mg Tabs      Dose: 1 tablet  Take 1 tablet by mouth daily  Refills: 0     senna-docusate 8.6-50 MG tablet  Commonly known as: SENOKOT-S/PERICOLACE      Dose: 2 tablet  Take 2 tablets by mouth daily  Refills: 0              ASSESSMENT/PLAN  Encounter Diagnoses   Name Primary?    Hemodialysis-associated hypotension Yes    Varicose ulcer of lower extremity, right (H)      Anemia in chronic kidney disease, on chronic dialysis (H)     Type 2 diabetes mellitus without complication, without long-term current use of insulin (H)     Closed bicondylar fracture of distal end of left humerus with routine healing, subsequent encounter     ESRD (end stage renal disease) (H)     Closed fracture of distal end of left humerus, initial encounter      Hypertension: Hypotension intermittently in TCU as low as 88/50s- rechecks have been >100. Decrease metoprolol to 50 daily, HOLD for SBP <100. Push fluids.     Hypokalemia- K 2.5; facility to update nephrologist stat. Did not hear back so did order KCL 20meq po x 1, Dialysis tomorrow.     Physical deconditioning PT/OT    Dialysis Patient M/W/F dialysis port right upper extremity fistula left upper extremity    S/P ORIF left distal humerus and left patella follow up with Orthopedics pain management monitoring report for changes in CMS,     Pain Management -Scheduled Tylenol,Lidocaine patch Robaxin scheduled, Prn Oxycodone     Dry skin on Urea cream    Insomnia- PRN Melatonin    Atrial Fibrillation continue Metoprolol Succinate; INR elevated 8/26, on HOLDx 48 hours. Recheck 8/28.     GERD On Omeprazole     BPH Home Oxybutynin    HDL On atorvastatin       Electronically signed by: Nila Jamison, CNP

## 2024-08-28 NOTE — PHARMACY-ANTICOAGULATION SERVICE
Clinical Pharmacy - Warfarin Dosing Consult     Pharmacy has been consulted to manage this patient s warfarin therapy.  Indication: Atrial Fibrillation  Therapy Goal: INR 2-3  Provider/Team: SOLO  Warfarin Prior to Admission: Yes  Warfarin PTA Regimen: 5mg daily (held since 8/23)  Significant drug interactions: cefepime  Recent documented change in oral intake/nutrition: Unknown  Dose Comments: hold due to supratherapeutic INR    INR   Date Value Ref Range Status   08/28/2024 4.58 (H) 0.85 - 1.15 Final   08/28/2024 5.12 (HH) 0.85 - 1.15 Final       Recommend warfarin 0 mg today.  Pharmacy will monitor Ijeoma Polk daily and order warfarin doses to achieve specified goal.      Please contact pharmacy as soon as possible if the warfarin needs to be held for a procedure or if the warfarin goals change.

## 2024-08-28 NOTE — MEDICATION SCRIBE - ADMISSION MEDICATION HISTORY
Medication Scribe Admission Medication History    Admission medication history is complete. The information provided in this note is only as accurate as the sources available at the time of the update.    Information Source(s): Facility (U/NH/) medication list/MAR and CareEverywhere/SureScripts via in-person    Pertinent Information: Utilized MAR from [facility]. Patient was previously on warfarin 5 mg daily most recently. Has been on hold since 8/23 due to high INR.     Changes made to PTA medication list:  Added:   Lidocaine 4% patch  Oxybutynin 5 mg  Urea 20% lotion  Omega 3  Methocarbamol 750 mg  Loperamide 2 mg  Melatonin 3 mg  Deleted:   Clonidine 0.1 mg Q4H PRN, not listed on MAR  Changed:   Acetaminophen 325 --> 500 mg  Metoprolol succinate 50 mg x 2 at bedtime --> 50 mg at bedtime     Allergies reviewed with patient and updates made in EHR: yes    Medication History Completed By: Chance Wilkerson 8/28/2024 4:01 PM    PTA Med List   Medication Sig Last Dose    acetaminophen (TYLENOL) 500 MG tablet Take 1,000 mg by mouth 3 times daily. 8/28/2024 at AM; 1,000 mg (1 g total today), 1 of 3    aspirin 81 MG EC tablet Take 81 mg by mouth 2 times daily. 8/28/2024 at AM; 1 of 2    atorvastatin (LIPITOR) 10 MG tablet TAKE 1 TABLET BY MOUTH ONCE DAILY AT BEDTIME 8/27/2024 at PM (1800)    B Complex-C-Folic Acid (TATY-BRIT RX) 1 mg TABS Take 1 tablet by mouth daily 8/28/2024 at AM    bisacodyl (DULCOLAX) 10 MG suppository Place 10 mg rectally every 72 hours as needed for constipation Unknown at PRN    diphenoxylate-atropine (LOMOTIL) 2.5-0.025 MG tablet Take 1 tablet by mouth every 8 hours as needed for diarrhea Past Week at 8/23    levothyroxine (EUTHYROX) 50 MCG tablet Take 1 tablet by mouth daily 8/28/2024 at AM    Lidocaine (LIDOCARE) 4 % Patch Place 1 patch onto the skin every 24 hours. To prevent lidocaine toxicity, patient should be patch free for 12 hrs daily. 8/28/2024 at AM; has one on     lidocaine-prilocaine (EMLA) 2.5-2.5 % external cream Apply topically daily as needed for moderate pain Unknown at PRN    loperamide (IMODIUM) 2 MG capsule Take 2 mg by mouth 4 times daily as needed for diarrhea (may take 1 capsule after each loose stool. do not exceed more than 16 mg in one day (8 capsules)). Unknown at PRN    Melatonin 10 MG TABS tablet Take 10 mg by mouth nightly as needed for sleep (this is in addition to the nightly 3 mg scheduled dose). Past Week at HS    melatonin 3 MG tablet Take 3 mg by mouth at bedtime. This is scheduled in addition to the 10 mg at bedtime PRN dose 8/27/2024 at HS    methocarbamol (ROBAXIN) 750 MG tablet Take 750 mg by mouth 3 times daily. 8/28/2024 at AM; 1 of 3    methoxy PEG-Epoetin Beta (MIRCERA) 30 MCG/0.3ML SOSY injectable syringe Inject 30 mcg into the vein every 14 days. Past Week at 8/23    metoprolol succinate ER (TOPROL XL) 50 MG 24 hr tablet Take 50 mg by mouth at bedtime. Hold for SBP <100 8/27/2024 at HS    Omega-3 Fatty Acids (OMEGA 3 PO) Take 1 tablet by mouth 3 times daily. Strength not known 8/28/2024 at AM; 1 of 3    omeprazole (PRILOSEC OTC) 20 MG EC tablet Take 20 mg by mouth 2 times daily. 8/28/2024 at AM;1 of 2    oxyBUTYnin (DITROPAN) 5 MG tablet Take 5 mg by mouth every morning. 8/28/2024 at AM    oxyCODONE (ROXICODONE) 5 MG tablet Take 2 tablets (10 mg) by mouth every 4 hours as needed for severe pain. 8/27/2024 at AM    polyethylene glycol (MIRALAX) 17 g packet Take 1 packet by mouth daily 8/28/2024 at AM    senna-docusate (SENOKOT-S/PERICOLACE) 8.6-50 MG tablet Take 2 tablets by mouth daily 8/28/2024 at AM    Urea 20 20 % LOTN Externally apply 1 Application topically daily. (Site not specified on MAR) 8/28/2024 at AM    warfarin ANTICOAGULANT (COUMADIN) 5 MG tablet Take 5 mg by mouth daily. (Currently on HOLD as of 8/23) Past Month at 8/22 PM

## 2024-08-28 NOTE — ED NOTES
Pt is very concerned about going back to [facility].  He doesn't believe they are taking care of him.

## 2024-08-28 NOTE — CONSULTS
RENAL CONSULT NOTE    REQUESTING PHYSICIAN: Dr Gonzalez    REASON FOR CONSULT: ESRD on HD    ASSESSMENT/PLAN:    - seen today, pt not overtly uremic and no significant metabolic derangements. 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  Runs now at [facility] under Cares of Dr Helm  MWF 3.5 TT, K3 bath  Has CVC and arm access which is currently not being utilized due to arm fracture of left arm.     Pt only with half run on Monday.   Pt without overt uremic symptoms and no significant metabolic derangement  Due to dialysis team with limited availability, and no pressing need now, we will do HD run tomorrow. Orders in place.   Reviewed with Dr Moreira.      Hypokalemia  Low recently since mid August, available historical labs normal-high  In dialysis clinic has been encouraged to increase dietary K   Is on K3 bath typically     MBD-CKD - phos at goal, calcium with correction lower end of normal     HTN - metoprolol    DM2 - no noted diabetic medications. Hypoglycemia noted, ER/hospital following.      Afib - metoprolol     HLD- statin    Thyroid disease - levothyroxine     HPI:   ESRD on HD MWF, hypertension diabetes gastric bypass anticoagulation related to atrial fibrillation.    Reportedly pt from [place], and plans to move to [facility], but due ot recent fall now at TCU.   Ijeoma is a patient new to our service at the [facility] for dialysis. He has been running there since 8/16/24.  Seen today at the unit by Dr Helm, and pt reportedly somnolent and not acting his normal self. Unable to dialyze to to very low bloo pressures: Today pre HD BP is 57/43x3 on RUE    Seen in ER with nurse and Dr Gonzalez  Pt looking chronically ill, busies/ hematomas    No NVD, no SOB  States he does urinate still  Pitting edema lower legs, arms appear with swelling with BP cuff on    Pt remarks  many times his dissatisfaction at TCU and does not want to return there. Says he is often left very uncomfortable.     I reviewed with him plans for dialysis tomorrow, he is on board with this.      REVIEW OF SYSTEMS:  OTILIA was completely reviewed and otherwise negative and non-contributory       Past Medical History:   Diagnosis Date    Chronic diarrhea     End stage renal disease (H)     H/O gastric bypass     History of bowel resection     Hypertension, surgical complication 3/9/2009    Hypokalemia     Membranous nephropathy determined by biopsy     PAF (paroxysmal atrial fibrillation) (H)     Type 2 diabetes mellitus without complication, without long-term current use of insulin (H) 08/10/2022     Social History     Socioeconomic History    Marital status:      Spouse name: Not on file    Number of children: Not on file    Years of education: Not on file    Highest education level: Not on file   Occupational History    Not on file   Tobacco Use    Smoking status: Never    Smokeless tobacco: Never   Substance and Sexual Activity    Alcohol use: Not Currently    Drug use: Never    Sexual activity: Not on file   Other Topics Concern    Not on file   Social History Narrative    Not on file     Social Determinants of Health     Financial Resource Strain: Low Risk  (2/9/2024)    Received from [facility]    Overall Financial Resource Strain (CARDIA)     Difficulty of Paying Living Expenses: Not hard at all   Food Insecurity: No Food Insecurity (8/9/2024)    Received from [facility] and [facility]    Hunger Vital Sign     Worried About Running Out of Food in the Last Year: Never true     Ran Out of Food in the Last Year: Never true   Transportation Needs: No Transportation Needs (8/9/2024)    Received from [facility] and [facility]    Transportation Needs     In the past 12 months, has lack of transportation kept you from medical appointments, meetings, work, or from getting  medicines or things needed for daily living?: No   Physical Activity: Insufficiently Active (1/1/2024)    Received from [email or web address], [facility]    Exercise Vital Sign     Days of Exercise per Week: 2 days     Minutes of Exercise per Session: 10 min   Stress: No Stress Concern Present (1/1/2024)    Received from [facility]    [employer] - Occupational Stress Questionnaire     Feeling of Stress : Not at all   Social Connections: Moderately Integrated (1/1/2024)    Received from [facility], [facility]    Social Connection and Isolation Panel [NHANES]     Frequency of Communication with Friends and Family: More than three times a week     Frequency of Social Gatherings with Friends and Family: More than three times a week     Attends Cheondo[employer] Services: More than 4 times per year     Active Member of Clubs or Organizations: Yes     Attends Club or Organization Meetings: More than 4 times per year     Marital Status:    Interpersonal Safety: Not At Risk (8/9/2024)    Received from [facility]    Intimate Partner Violence     Are you in a relationship where you are physically hurt, threatened and/or made to feel afraid?: No   Housing Stability: Low Risk  (8/9/2024)    Received from [facility]    Housing Stability Vital Sign     Unable to Pay for Housing in the Last Year: No     Number of Times Moved in the Last Year: 0     Homeless in the Last Year: No          ALLERGIES/SENSITIVITIES:  Allergies   Allergen Reactions    Furosemide Hives    Lorazepam Rash    Penicillins Rash    Sulfadiazine Rash         PHYSICAL EXAM:  Physical Exam   Temp: 97.5  F (36.4  C) Temp src: Oral BP: 100/52 Pulse: (!) 141   Resp: 13 SpO2: 100 %  O2 Device: Nasal cannula Oxygen Delivery: 2 LPM  There were no vitals filed for this visit.  Vital Signs with Ranges  Temp:  [97.5  F (36.4  C)] 97.5  F (36.4  C)  Pulse:  [] 141  Resp:  [0-20] 13  BP: ()/(48-72) 100/52  SpO2:  [90 %-100 %] 100 %  No intake/output data recorded.    Temp (24hrs), Av.5  F (36.4  C), Min:97.5  F (36.4  C), Max:97.5  F (36.4  C)      No data found.    General: A&Ox3, ill looking. Bruises and wraps throughout.   HEENT:  no scleral icterus  NECK:  no carotid bruits, no JVD  RESPIRATORY:  Lungs anterior auscultation without any abnormality, normal effort , O2 canula   CARDIOVASCULAR:  RRR no overt murmur,   VOLUME 1-2 pitting lower legs, upper arms diffusely swellen with cuff and wraps on  ABDOMEN:  soft, BS present, non-tender, non-distended   GENITOURINARY:  No sanches   INTEGUMENTARY: Warm, dry, no rash  NEUROLOGIC: grossly intact   Psych: calm, cooperative  ACCESS: CVC    Laboratory:     Recent Labs   Lab 24  1231 24  0728 24  1553   WBC 12.7* 10.7 11.9*   RBC 2.89* 2.45* 2.69*   HGB 9.5* 8.0* 8.8*   HCT 29.0* 24.9* 26.0*   * 333 318       Basic Metabolic Panel:  Recent Labs   Lab 24  1350 24  1330 24  1301 24  1248 24  1231 24  0728   NA  --   --   --   --  133*  132* 134*   POTASSIUM  --   --   --   --  3.3*  3.6 2.5*   CHLORIDE  --   --   --   --  90*  90* 92*   CO2  --   --   --   --  21*  21* 30*   BUN  --   --   --   --  36.0*  34.7* 28.9*   CR  --   --   --   --  3.99*  3.86* 3.30*   * 44* 66* 37* 98  64* 59*   MELISSA  --   --   --   --  7.4*  7.9* 7.3*       INR  Recent Labs   Lab 24  1231 24  0706 24  0715 24  0641   INR 4.58* 5.12* 6.41* 5.36*       Recent Labs   Lab Test 24  1231 24  0728   POTASSIUM 3.3*  3.6 2.5*   CHLORIDE 90*  90* 92*   BUN 36.0*  34.7* 28.9*      Recent Labs   Lab Test 24  1231 22  1339 22  1333   ALBUMIN 2.8*   --  3.1*   BILITOTAL 0.6  --  0.4   ALT 21  --  34   AST  --   --  31   PROTEIN  --  300*  --        Personally reviewed today's laboratory studies      Thank you for involving us in the care of this patient. We will continue to follow along with you.      Vania De Paz PA-C   [facility]  [phone]

## 2024-08-29 NOTE — PROGRESS NOTES
HEMODIALYSIS TREATMENT NOTE    Date: 8/29/2024  Time: 5:48 PM    Data:  Modality: bed   Pre Wt: 76.2 kg (167 lb 15.9 oz)   Desired Wt:   kg   Post Wt:  76.4 estimated with blood transfusion   Weight change:   kg  Ultrafiltration - Post Run Net Total Removed (mL): 0 mL  Vascular Access Site: patent   Dialyzer Rinse: Streaked, Light  Total Blood Volume Processed: 62.1 L Liters  Total Dialysis (Treatment) Time: 3.0 hours Hours  Dialysate Bath: K 3, Ca 3  Heparin: Heparin: None    Lab:   No    Interventions:  Dialysis done through: Right catheter  UF set to . Liters of fluid removal, accommodating priming and rinse back volumes  BFR: Blood Flow Rate (BFR): 300 mL/min  DFR: Potassium/Calcium Rate: 600 mL/min  See MAR for medications administered   CVC dressing changed aseptically  Catheter lumens locked with NS, cath guards changed post HD  CritLine stable throughout the run, tolerating UF pull, see flowsheets for additional information.  Glucose checks per ICU team   Report given to Gracie FALCON, pt  in stable condition.    Assessment:  A/O x 4, calm & cooperative, denies pain  Access site dressing changed. C/D/I    3 hours treatment today with no net UF and reduced BFR d/t stability of patient. Rapid response called just prior to start of run. See notes for details. Pt tolerated this treatment well while maintaining SBP >90 and MAP at or above 70 throughout run. 1 unit PRBC transfused without complications.      Plan:    Per Renal team

## 2024-08-29 NOTE — CONSULTS
CRITICAL CARE CONSULT:    8/28/2024 12:02 PM    CCx:Septic shock    HPI:Mr. Polk is a 74 year old gentleman with a past medical history significant for history of gastric bypass, short gut syndrome, chronic diarrhea, end-stage renal disease who was previously dialyzing via left upper extremity AV fistula now through a right tunneled subclavian catheter following thrombosis of graft after recent fall, hypertension, paroxysmal atrial fibrillation on anticoagulation, type 2 diabetes amongst other medical issues who presents to the hospital for the aforementioned chief complaint.  His recent medical history is significant for a mechanical fall when he presented to [facility] with subarachnoid hemorrhage, sternal fractures and rib fractures, left-sided distal humeral fracture, left patellar fracture and a right sixth to eighth rib fractures.  He had been discharged to [facility] for rehab on 8/16 and was noted to be somnolent and hypotensive with systolic blood pressures in the 50s yesterday.  On arrival to the emergency department yesterday, he was noted to be hypoglycemic requiring 2 A of D50 and subsequently initiated on D10 infusion.  He was also noted to be in atrial fibrillation with RVR and was administered metoprolol after which an amiodarone infusion was initiated.  He was noted to have lactic acidosis, supratherapeutic INR of 5, hypokalemia mild leukocytosis, anemia and a mild troponin elevation in the absence of acute ST-T changes.  Use noted on exam to have a long T-spine pressure ulcer that appeared to be infected and thought to be the etiology of his present sepsis.  Blood cultures were drawn and he was initiated on broad-spectrum antibiotics.  Notably, overnight he continued to be hypotensive and did not have improvement of blood pressures with initiation of fluids because of which norepinephrine infusion was initiated.     Recent medical history:  As noted above, he was recently admitted  to [facility] after mechanical fall that was not preceded by any dizziness or loss of consciousness.  He was noted to have a small subarachnoid hemorrhage, sternal fractures right 6-8 rib fractures, left patellar fracture left distal humeral fracture.  His INR was noted to be 7 at the time of admission with a hemoglobin of 6.4 and he was treated with Kcentra and administered packed red blood cells.  During the same admission it was noted that his left upper extremity fistula developed a thrombus and he underwent thrombectomy on 8/12.  He also underwent an ORIF left patellar fracture and left distal humerus fracture on the same day.  Neurosurgery was consulted but felt that his subarachnoid hemorrhage could be manage conservatively.  Is also noted that he likely had digoxin toxicity during that hospitalization and this was subsequently discontinued.    Past Medical History:  Gastric bypass  Short gut syndrome  Chronic diarrhea  ESRD who was previously dialyzing via left upper extremity AV fistula now through a right tunneled subclavian catheter  Hypertension  Paroxysmal A-fib on anticoagulation   Type 2 diabetes  Recent sternal, rib, left sided distal femoral, left humeral and left patellar fractures following a fall    Past Surgical History:  Status post thrombectomy of left upper extremity fistula  Status post ORIF left humeral fracture  Status post ORIF left patella fracture    Social History:  Reviewed.     Family History:  Reviewed.    Allergies:  Reviewed.    MAR Reviewed      ICU DAILY CHECKLIST                           Can patient transfer out of MICU? no    FAST HUG:    Feeding:  Feeding: Yes.  Patient is receiving NPO    Dasilva:No  Analgesia/Sedation:No  Thromboembolic prophylaxis: Yes; Mode:  Coumadin  HOB>30:  Yes  Stress Ulcer Protocol Active: Yes; Mode: PPI  Glycemic Control: Any glucose > 180 yes; Mode of Insulin Therapy: Sliding Scale Insulin    INTUBATED:  Can patient have daily waking:   NA  Can patient have spontaneous breathing trial:  NA    Restraints? no   PHYSICAL THERAPY AND MOBILITY:  Can patient have PT and mobility trial: no  Activity: Bed      Physical Exam:  Vent settings for last 24 hours:  Resp: 12    BP 93/52   Pulse 84   Temp 97.8  F (36.6  C) (Axillary)   Resp 12   SpO2 98%     Intake/Output last 3 shifts:  I/O last 3 completed shifts:  In: 3332.17 [I.V.:1832.17; IV Piggyback:1500]  Out: -   Intake/Output this shift:  No intake/output data recorded.    Physical Exam  HENT:      Head: Normocephalic.      Comments: Ecchymosis noted on face, neck.  Edentulous.     Mouth/Throat:      Mouth: Mucous membranes are dry.   Cardiovascular:      Heart sounds: Normal heart sounds.      Comments: Afib.    Pulmonary:      Effort: Pulmonary effort is normal.      Comments: Diminished BS BL.  Abdominal:      Palpations: Abdomen is soft.   Musculoskeletal:      Comments: LUE ace wrapped.  LLE ace wrapped and in a boot.   Skin:     General: Skin is warm.      Findings: Bruising present.   Neurological:      Mental Status: He is alert.         Lines/Drains/Tubes:  Peripheral IV 08/28/24 Anterior;Left Neck (Active)   Site Assessment WDL 08/29/24 0600   Line Status Saline locked 08/29/24 0600   Dressing Transparent 08/29/24 0600   Dressing Status clean;dry;intact 08/29/24 0600   Line Intervention Flushed 08/29/24 0000   Phlebitis Scale 0-->no symptoms 08/29/24 0600   Infiltration? no 08/29/24 0600   Number of days: 1       CVC Triple Lumen Right Femoral (Active)   Site Assessment WDL 08/29/24 0600   Dressing Chlorhexidine disk;Transparent 08/29/24 0600   Dressing Status clean;dry;intact 08/29/24 0600   Line Necessity Yes, meets criteria 08/29/24 0600   Blue - Status infusing 08/29/24 0600   Brown - Status infusing 08/29/24 0600   White - Status infusing 08/29/24 0600   Phlebitis Scale 0-->no symptoms 08/29/24 0600   Infiltration? no 08/29/24 0600   Number of days: 0       LAB:  ROUTINE ICU LABS (Last  four results)  CMP  Recent Labs   Lab 08/29/24  0606 08/29/24  0113 08/28/24  2130 08/28/24  1927 08/28/24  1248 08/28/24  1231 08/27/24 0728   *  --   --  132*  --  133*  132* 134*   POTASSIUM 2.8*  2.8* 2.7*  --  2.7*  --  3.3*  3.6 2.5*   CHLORIDE 93*  --   --  93*  --  90*  90* 92*   CO2 26  --   --  23  --  21*  21* 30*   ANIONGAP 12  --   --  16*  --  22*  21* 12   GLC 87  87 105* 122* 127*   < > 98  64* 59*   BUN 38.0*  --   --  36.5*  --  36.0*  34.7* 28.9*   CR 3.96*  --   --  3.83*  --  3.99*  3.86* 3.30*   GFRESTIMATED 15*  --   --  16*  --  15*  16* 19*   MELISSA 7.5*  --   --  7.4*  --  7.4*  7.9* 7.3*   MAG 2.0  --   --  1.8  --  2.0  --    PHOS 4.1  --   --   --   --   --   --    PROTTOTAL  --   --   --   --   --  5.2*  --    ALBUMIN 2.7*  --   --   --   --  2.8*  --    BILITOTAL  --   --   --   --   --  0.6  --    ALKPHOS  --   --   --   --   --  158*  --    ALT  --   --   --   --   --  21  --     < > = values in this interval not displayed.     CBC  Recent Labs   Lab 08/29/24  0113 08/28/24  1231 08/27/24  0728   WBC 7.4 12.7* 10.7   RBC 3.42* 2.89* 2.45*   HGB 11.2* 9.5* 8.0*   HCT 32.7* 29.0* 24.9*   MCV 96 100 102*   MCH 32.7 32.9 32.7   MCHC 34.3 32.8 32.1   RDW 17.3* 19.0* 18.6*    469* 333     INR  Recent Labs   Lab 08/29/24  0113 08/28/24  1812 08/28/24  1231 08/28/24  0706   INR 5.81* 5.98* 4.58* 5.12*     Arterial Blood Gas  Recent Labs   Lab 08/29/24  0208 08/28/24  2349 08/28/24  1231   O2PER 21 21 21         MICRO:  Date Source Organism   8/28 Blood NGTD     Organism Antibiotic Antibiotic Start Date Antibiotic End Date   NGTD Cefepime 8/28 Ongoing    Vancomycin 8/28 Ongoing         IMAGING:  XR Pelvis 8/29/24:  Right femoral catheter terminates in the right hemipelvis. No acute fracture or dislocation. Mild degenerative narrowing of bilateral hip joints. Significant bilateral iliofemoral arterial atherosclerotic calcification.    CXR 8/28/24:  There is a tunneled  dual lumen right IJ catheter with the tip over the right atrium. There are pleural effusions with atelectasis. Cannot assess for underlying airspace disease. No pneumothorax. The heart is not well assessed.     CT Head w/o Contrast 8/28/24:  1.  No acute intracranial process.  2.  Chronic small vessel ischemic disease and generalized cerebral atrophy.  3.  Encephalomalacia in the left frontal and left occipital lobes.      Assessment/Plan:  Ijeoma Polk is a 74 year old gentleman with a past medical history significant for gastric bypass, short gut syndrome, chronic diarrhea, end-stage renal disease who was previously dialyzing via left upper extremity AV fistula now through a right tunneled subclavian catheter following thrombosis of graft after recent fall, hypertension, paroxysmal atrial fibrillation on anticoagulation, type 2 diabetes, presenting to the hospital for septic shock likely from infected pressure ulcers.    NEURO: No acute issues presently.  Pain appears to be well-controlled.  Continue scheduled Tylenol for now.  If he requires about pain management, would have a low threshold to administer given extent of fractures.  CVS:Has a known medical history of hypertension and paroxysmal atrial fibrillation.  He is on beta-blockers and systemic anticoagulation for these and presented yesterday with hypotension and lactic acidosis likely from septic shock.  He has previously been noted to do raise concerns for adrenal insufficiency and had a spot cortisol checked which was noted to be within normal limits.  He is typically on outpatient midodrine for low blood pressures.  Was noted to have runs of A-fib with RVR while in the emergency department and was administered metoprolol x 2 and switched over to an amiodarone infusion subsequently on.  He was also noted to have nonsustained runs of ventricular tachycardia which were likely secondary to his ongoing hypokalemia and have presently resolved.  Continue  telemetry monitoring.  MAP goal of 65mmHg with SBP>90mmHg.  Vasopressors: Norepinephrine  Continue previously scheduled midodrine 3 times daily.  Afib  Being treated with Amiodarone.  We will hold anticoagulation presently given supratherapeutic INR.  TTE today.  RESP: Chest imaging demonstrates bilateral pleural effusions but is noted to have normal oxygen saturations on room air.  Suspect that this is third spacing from significant malnutrition.  It is noted that he was recently hospitalized after a fall and had sternal and rib fractures which were managed conservatively.  Maintain SpO2 of >92%.  GI:Prior history of small bowel obstruction status post small bowel resection and since then short gut syndrome and chronic diarrhea  Resume p.o. diet.  Continue Protonix daily for ulcer prophylaxis.  Have talked to nutrition and will do calorie counting.  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 Monday, Wednesday and Friday hemodialysis through a left upper extremity fistula.  During his recent hospitalization, he developed a thrombus in this and underwent a thrombectomy of his fistula.  He was subsequently initiated on dialysis through a right tunneled subclavian access.  He is noted to have multiple electrolyte abnormalities most notably hypokalemia and is due to be run against a higher potassium bath.  Would trend renal function daily and closely follow I/O.  Follow electrolytes and correct as abnormalities arise.  Did receive potassium replacement overnight.  ID:Presents with leukocytosis and septic shock likely secondary to infected wounds in his back.  Obtain Blood cultures.  Antibiotics: Continue cefepime and vancomycin.  Have consulted orthopedics to assist in evaluation of his recent ORIF and make recommendations as needed.  HEMATOLOGIC:Noted to have acute onset of anemia and elevated  INR.  Daily CBC.  Will administer another 2-1/2 mg of vitamin K (received 2-1/2 mg on 8/28) for ongoing supratherapeutic INR.  ENDOCRINE:Known medical history of DM and presented with significant hypoglycemia despite ampules of D50 being administered.  He is also noted to be hypothyroid.  ICU insulin sliding scale.  Continue D10 infusion at 50 mL an hour.  Accu-Cheks 4 times daily and with meals.  Continue outpatient levothyroxine.  ICU PROPHYLAXIS:Protonix.  DISPO:Unclear. Continues to require ICU levels of care.      Clinically Significant Risk Factors Present on Admission        # Hypokalemia: Lowest K = 2.7 mmol/L in last 2 days, will replace as needed   # Hypocalcemia: Lowest iCa = 4.1 mg/dL in last 2 days, will monitor and replace as appropriate    # Anion Gap Metabolic Acidosis: Highest Anion Gap = 22 mmol/L in last 2 days, will monitor and treat as appropriate  # Hypoalbuminemia: Lowest albumin = 2.7 g/dL at 8/29/2024  6:06 AM, will monitor as appropriate  # Drug Induced Coagulation Defect: home medication list includes an anticoagulant medication  # Drug Induced Platelet Defect: home medication list includes an antiplatelet medication   # Hypertension: Noted on problem list   # Circulatory Shock: required vasopressors within past 24 hours                Total Critical Care Time : 1 Hour 15 Minutes    Mariah Liz MD  Pulmonary and Critical Care  [facility]

## 2024-08-29 NOTE — PROGRESS NOTES
TELE ICU Progress Note          Assessment and Plan:     Ijeoma Polk is a 74 year old patient being cared for in the ICU for circulatory shock, concern for possible septic shock. Pertinent assessment and recommendations include:  Neurology: No acute issues.  Patient is compensated, alert oriented x 3.  Minimize sedation.  Monitor.   Cardiovascular / Hemodynamics: Circulatory shock, likely vasoplegic shock due to septic shock, concern for possible cardiogenic component specially with the nonsustained V. tach.  EKG showed A-fib with RVR, ventricular rate 149, nonspecific ST changes.  BNP was elevated at 39,000, troponin T was also elevated but with a flat trend so far at 168.  No evidence for fluid overload clinically though, patient is on room air.  Chest x-ray showed low volume with possible atelectasis at bases +/- pleural effusions.  I will give him 1 bolus of albumin.  Order peripheral norepinephrine.  Will likely need central access, working on PICC line, if unable would need CVC which I recommended hospitalist to discuss with either ED or anesthesiology to place central venous catheter.  Consider echocardiogram in the morning. Start midodrine 10 mg TID.  Type II myocardial infarction, likely secondary to demand/supply mismatch ischemia secondary to above.  Continue to trend troponin every 6 hours.  Consider echocardiogram.  A-fib with RVR, and nonsustained V. tach.  Continue amiodarone drip at reduced rate per cardiology recommendation.  Monitor electrolytes and maintain homeostasis of the potassium above 4 and magnesium above 2.  Hold warfarin as the patient has supratherapeutic INR.  Hold BB given the hypotension.   Pulmonary: No acute issues, patient is on room air.  Chest x-ray showed possible bibasilar atelectasis versus infiltrate +/- pleural effusions.  Monitor.  Encourage incentive spirometry.   GI and Nutrition: No acute issues   Renal, Fluid and Electrolytes: End-stage renal disease on  hemodialysis, was scheduled to get hemodialysis on 8/29.  No indication for acute dialysis or CRRT for now.  Monitor.  He has right tunneled dialysis line in the internal jugular.   Infectious Disease: Septic shock, concern for skin and soft tissue infection with cellulitis.  Agree with infectious workup, follow blood cultures.  Agree with broad-spectrum antibiotics with vancomycin and cefepime.   Hematology and Oncology: Chronic anemia of chronic disease  Leukocytosis  Thrombocytosis  Supratherapeutic INR, on warfarin as an outpatient for A-fib.  Hold warfarin.  Received vitamin K.  No evidence for acute bleeding.   Endocrinology: Hypothyroidism, agree with continuing levothyroxine.  TSH was remarkably elevated but normal free T4.  Check serum cortisol level.  Blood sugar control per ICU protocol.   Intensive Care: Central line: No central line present  Arterial line: No arterial line present  Restraint: Not indicated.  Dasilva catheter: None   DVT prophylaxis: Supratherapeutic INR  GI prophylaxis: Not indicated   Primary Team Communication: Discussed with hospitalist   Billing: Total critical care time spent by me, excluding procedures, was 50 minutes.      Chart documentation was completed, in part, with Vessel voice-recognition software. Even though reviewed, some grammatical, spelling, and word errors may remain.      Idania Crenshaw MD  8/28/2024           Hospital Course and Key events       The patient remains critically ill with Shock.  Patient has a history of end-stage renal disease, on hemodialysis, hypertensive nephrosclerosis, membranous nephropathy, atrial fibrillation on Coumadin as an outpatient, chronic anemia, and type 2 diabetes mellitus.  He also had recent fall with multiple fractures involving his left lower extremities along with multiple rib fractures and sternal fracture.  These were managed in  recently.  The patient was admitted from the nephrology clinic due to hypotension  and somnolence.  Was admitted for presumptive severe sepsis and septic shock due to potential skin and soft tissue infection involving pressure wounds on the legs and back.  He was initiated on antibiotics with vancomycin and cefepime.  He was given IV fluid boluses 2.5 L of crystalloid.  He had runs of nonsustained V. tach, he was initiated on amiodarone drip which was stopped due to hypotension.                 Medications:     I have reviewed this patient's current medications  Current Facility-Administered Medications   Medication Dose Route Frequency Provider Last Rate Last Admin    amiodarone (NEXTERONE) 1.8 mg/mL in dextrose 5% 200 mL ADULT STANDARD infusion  1 mg/min Intravenous Continuous Latanya Auguste MD 33.3 mL/hr at 24 2133 1 mg/min at 24 2133    [START ON 2024] amiodarone (NEXTERONE) 1.8 mg/mL in dextrose 5% 200 mL ADULT STANDARD infusion  0.5 mg/min Intravenous Continuous Latanya Auguste MD        dextrose 10% infusion   Intravenous Continuous Ashwini Gonzalez MD   Stopped at 24 1505          Vitals and Exam:       Vital Sign Ranges  Temperature Temp  Av.6  F (36.4  C)  Min: 97.5  F (36.4  C)  Max: 97.7  F (36.5  C)   Blood pressure Systolic (24hrs), Av , Min:63 , Max:113        Diastolic (24hrs), Av, Min:48, Max:76      Pulse Pulse  Av.3  Min: 75  Max: 181   Respirations Resp  Av.8  Min: 0  Max: 24   Pulse oximetry SpO2  Av.9 %  Min: 75 %  Max: 100 %       I/O    Intake/Output Summary (Last 24 hours) at 2024 2310  Last data filed at 2024  Gross per 24 hour   Intake 1894.88 ml   Output --   Net 1894.88 ml       Resp: 10    Physical Examination:   I examined this patient remotely using the telemedicine camera in the [facility]. The patient is located at [facility]    General Appearance:   Patient is comfortable    HEENT:   On RA, supple neck      Respiratory:   No respiratory distress      Neuro:   Alert and oriented x 3   Other   None           Pertinent Data:     All pertinent labs and diagnostic studies were reviewed    Labs:  CMP  Recent Labs   Lab 08/28/24  2130 08/28/24  1639 08/28/24  1447 08/28/24  1350 08/28/24  1248 08/28/24  1231 08/27/24  0728   NA  --   --   --   --   --  133*  132* 134*   POTASSIUM  --   --   --   --   --  3.3*  3.6 2.5*   CHLORIDE  --   --   --   --   --  90*  90* 92*   CO2  --   --   --   --   --  21*  21* 30*   ANIONGAP  --   --   --   --   --  22*  21* 12   * 152* 169* 123*   < > 98  64* 59*   BUN  --   --   --   --   --  36.0*  34.7* 28.9*   CR  --   --   --   --   --  3.99*  3.86* 3.30*   GFRESTIMATED  --   --   --   --   --  15*  16* 19*   MELISSA  --   --   --   --   --  7.4*  7.9* 7.3*   MAG  --   --   --   --   --  2.0  --    PROTTOTAL  --   --   --   --   --  5.2*  --    ALBUMIN  --   --   --   --   --  2.8*  --    BILITOTAL  --   --   --   --   --  0.6  --    ALKPHOS  --   --   --   --   --  158*  --    ALT  --   --   --   --   --  21  --     < > = values in this interval not displayed.     CBC  Recent Labs   Lab 08/28/24  1231 08/27/24 0728   WBC 12.7* 10.7   RBC 2.89* 2.45*   HGB 9.5* 8.0*   HCT 29.0* 24.9*    102*   MCH 32.9 32.7   MCHC 32.8 32.1   RDW 19.0* 18.6*   * 333     INR  Recent Labs   Lab 08/28/24  1812 08/28/24  1231 08/28/24  0706 08/26/24  0715   INR 5.98* 4.58* 5.12* 6.41*     Arterial Blood Gas  Recent Labs   Lab 08/28/24  1231   O2PER 21     Lactic Acid   Date Value Ref Range Status   08/28/2024 1.3 0.7 - 2.0 mmol/L Final       Imaging:  XR Chest Port 1 View  Narrative: EXAM: XR CHEST PORT 1 VIEW  LOCATION: [facility]  DATE: 8/28/2024    INDICATION: tachycardia  COMPARISON: 10/5/2022  Impression: IMPRESSION: There is a tunneled dual lumen right IJ catheter with the tip over the right atrium. There are pleural effusions with atelectasis. Cannot assess for underlying airspace disease.  No pneumothorax. The heart is not well assessed.  Head CT w/o contrast  Narrative: EXAM: CT HEAD W/O CONTRAST  LOCATION: [facility]  DATE: 8/28/2024    INDICATION: altered mental status at HD  COMPARISON: None.  TECHNIQUE: Routine CT Head without IV contrast. Multiplanar reformats. Dose reduction techniques were used.    FINDINGS:  INTRACRANIAL CONTENTS: No intracranial hemorrhage, extraaxial collection, or mass effect.  No CT evidence of acute infarct. Mild to moderate presumed chronic small vessel ischemic changes. Left frontal and left occipital encephalomalacia/gliosis. Mild   generalized cerebral atrophy. Normal ventricles and sulci.     VISUALIZED ORBITS/SINUSES/MASTOIDS: No intraorbital abnormality. Mild mucosal thickening scattered about the paranasal sinuses. No middle ear or mastoid effusion.    BONES/SOFT TISSUES: No acute abnormality.  Impression: IMPRESSION:  1.  No acute intracranial process.  2.  Chronic small vessel ischemic disease and generalized cerebral atrophy.  3.  Encephalomalacia in the left frontal and left occipital lobes.

## 2024-08-29 NOTE — PROGRESS NOTES
"CLINICAL NUTRITION SERVICES - ASSESSMENT NOTE     Nutrition Prescription    RECOMMENDATIONS FOR MDs/PROVIDERS TO ORDER:  None     Malnutrition Status:    Severe malnutrition  In Context of:  Acute illness or injury  Chronic illness or disease    Recommendations already ordered by Registered Dietitian (RD):  - Nutrition education for nutrition relationship to health/disease - need for high protein diet  - Medical food supplement therapy - [employer] 1.0 BID, Expedite daily    Future/Additional Recommendations:  Monitor po intake, labs, ONS tolerance     REASON FOR ASSESSMENT  Ijeoma Polk is a/an 74 year old male assessed by the dietitian for Nutrition Risk Monitoring    NUTRITION HISTORY  Dx: Hypotension, sepsis, PI and wounds  PMH: end-stage renal disease, short gut syndrome, hypertension, hypertensive nephrosclerosis, membranous nephropathy from biopsy, atrial fibrillation on warfarin, anemia of chronic disease, diabetes mellitus type 2, recent fall, chronic diarrhea    Met with pt in room this morning. Pt reports feeling \"excellent\" this morning, which is a big improvement from when he was admitted.     Pt reports he has not been eating well while at [facility], where he has been for several weeks now (maybe longer?). Pt is on a modified-texture diet there, so the food \"is mush\" and is often cold when it reaches his room, which results in pt not eating as much as he'd like. Pt has no teeth, but acquired dentures in the past year.    Pt reports having gastric bypass in 2003, and bowel resection previously.     Pt has chronic diarrhea, and avoids milk-based supplements like Ensure because they contribute to diarrhea according to him. We discussed pt's recent wt loss, and high need for protein for wound healing and dialysis. Pt was agreeable to trying [employer] 1.0, a plant-based supplement.     CURRENT NUTRITION ORDERS  Diet: Renal (dialysis)  Intake/Tolerance: No intake yet this admit    LABS  Labs " reviewed  Na: 131 (L)  K+: 2.8 (L)  BUN: 38 (H)  Cr: 3.96 (H)  B-169 past 24 hours    MEDICATIONS  Medications reviewed  Synthroid  Protonix  D10 infusion @50 mL/hr    PHYSICAL FINDINGS  Wounds on back and limbs related to recent fall  Edema: 3+ moderate in R arm, left hip, BLE knees, ankles, feet    ANTHROPOMETRICS  Height: 182.9 cm (6')  Most Recent Weight: 76.2 kg (168 lb)  IBW: 81 kg  BMI: Normal BMI (22)  Weight History:   24 : 76.2 kg (168 lb)  24 : 80.3 kg (177 lb)  24 : 80.3 kg (177 lb)  22 : 80.7 kg (177 lb 14.4 oz)  22 : 81 kg (178 lb 9.2 oz)  6% wt loss in 1 month    Dosing Weight: 76.2 kg actual body wt    ASSESSED NUTRITION NEEDS  Estimated Energy Needs: 1742-2189 kcals/day (25 - 30 kcals/kg)  Justification: Maintenance  Estimated Protein Needs: 114-152 grams protein/day (1.5 - 2 grams of pro/kg)  Justification: Dialysis, Repletion, and Wound healing  Estimated Fluid Needs: 1558-1268 mL/day (1 mL/kcal)   Justification: Maintenance    MALNUTRITION:  % Weight Loss:  > 5% in 1 month (severe malnutrition)  % Intake:  </= 75% for >/= 1 month (severe malnutrition)  Subcutaneous Fat Loss:  Orbital region moderate depletion and Thoracic region mild depletion  Muscle Loss:  Temporal region moderate depletion, Clavicle bone region moderate depletion, and Scapular bone region moderate depletion  Fluid Retention:  Moderate     Malnutrition Diagnosis: Severe malnutrition  In Context of:  Acute illness or injury  Chronic illness or disease    NUTRITION DIAGNOSIS  Malnutrition related to poor appetite, altered GI function as evidenced by >5% wt loss in 1 month, <75% intake for >1 month, fat and muscle losses, moderate edema      INTERVENTIONS  Implementation  - Nutrition education for nutrition relationship to health/disease - need for high protein diet  - Medical food supplement therapy - [employer] 1.0 BID, Expedite daily    Goals  - Total avg nutritional intake to meet a minimum  of 25 kcal/kg and 1.5 g PRO/kg daily (per dosing wt 76.2 kg).  - Maintain wt >76.2  - Electrolytes WNL     Monitoring/Evaluation  Progress toward goals will be monitored and evaluated per protocol.

## 2024-08-29 NOTE — PLAN OF CARE
Patient arrived to unit in afib with frequent PVCs. Blood pressures were stable at that time.  Blood pressures did trend down and fluid bolus was given.  Amiodarone bolus and infusion also ordered.  Patient started to get hypotensive so amiodarone dose was reduced to 0.5 per cardiologist and tele ICU MD. Order received for PICC line, attempting to contact staff to get one placed now.   Patient mostly denies pain and is appreciative of cares. Blood sugars have been stable.    Problem: Dysrhythmia  Goal: Normalized Cardiac Rhythm  Outcome: Not Progressing     Problem: Sepsis/Septic Shock  Goal: Absence of Infection Signs and Symptoms  Outcome: Not Progressing     Problem: Wound  Goal: Optimal Wound Healing  Outcome: Not Progressing     Problem: Sepsis/Septic Shock  Goal: Blood Glucose Level Within Targeted Range  Outcome: Progressing   Goal Outcome Evaluation:

## 2024-08-29 NOTE — CONSULTS
[facility] Nurse Inpatient Assessment     Consulted for: pressure wounds on back    Summary: Unable to visualize wounds today, patient on dialysis and levo - bedside nurse stating blood pressure dips drastically with surgery at this time.     Patient History (according to provider note(s):      8/28/2024 12:02 PM     CCx:Septic shock     HPI:Mr. Polk is a 74 year old gentleman with a past medical history significant for history of gastric bypass, short gut syndrome, chronic diarrhea, end-stage renal disease who was previously dialyzing via left upper extremity AV fistula now through a right tunneled subclavian catheter following thrombosis of graft after recent fall, hypertension, paroxysmal atrial fibrillation on anticoagulation, type 2 diabetes amongst other medical issues who presents to the hospital for the aforementioned chief complaint.  His recent medical history is significant for a mechanical fall when he presented to [facility] with subarachnoid hemorrhage, sternal fractures and rib fractures, left-sided distal humeral fracture, left patellar fracture and a right sixth to eighth rib fractures.  He had been discharged to [facility] for rehab on 8/16 and was noted to be somnolent and hypotensive with systolic blood pressures in the 50s yesterday.  On arrival to the emergency department yesterday, he was noted to be hypoglycemic requiring 2 A of D50 and subsequently initiated on D10 infusion.  He was also noted to be in atrial fibrillation with RVR and was administered metoprolol after which an amiodarone infusion was initiated.  He was noted to have lactic acidosis, supratherapeutic INR of 5, hypokalemia mild leukocytosis, anemia and a mild troponin elevation in the absence of acute ST-T changes.  Use noted on exam to have a long T-spine pressure ulcer that appeared to be infected and thought to be the etiology of his present sepsis.  Blood cultures were drawn  and he was initiated on broad-spectrum antibiotics.  Notably, overnight he continued to be hypotensive and did not have improvement of blood pressures with initiation of fluids because of which norepinephrine infusion was initiated.     Recent medical history:  As noted above, he was recently admitted to [facility] after mechanical fall that was not preceded by any dizziness or loss of consciousness.  He was noted to have a small subarachnoid hemorrhage, sternal fractures right 6-8 rib fractures, left patellar fracture left distal humeral fracture.  His INR was noted to be 7 at the time of admission with a hemoglobin of 6.4 and he was treated with Kcentra and administered packed red blood cells.  During the same admission it was noted that his left upper extremity fistula developed a thrombus and he underwent thrombectomy on 8/12.  He also underwent an ORIF left patellar fracture and left distal humerus fracture on the same day.  Neurosurgery was consulted but felt that his subarachnoid hemorrhage could be manage conservatively.  Is also noted that he likely had digoxin toxicity during that hospitalization and this was subsequently discontinued.    Assessment:      Pressure Injury Location: spine    8/29    Last photo: 8/29 (from medical team)  Wound type: Pressure Injury     Pressure Injury Stage: Unstageable, present on admission     Wound history/plan of care:   bedside nurse reported that the patient stated she was not turned all night at her TCU    Wound base: 100 % Eschar,      Palpation of the wound bed:  BRIAN       Drainage: scant     Description of drainage: serosanguinous - per report     Measurements (length x width x depth, in cm) unknown     Tunneling N/A     Undermining N/A  Periwound skin: Erythema- blanchable      Color: dusky, pink, and red      Temperature: unknown  Odor:  unknown  Pain: tension to hands, feet and body, facial expression of distress, and per nurse report  Pain intervention  prior to dressing change: slow and gentle cares   Treatment goal: Infection control/prevention, Protection, Remove necrotic tissue, and Soften necrotic tissue  STATUS: initial assessment  Supplies ordered: ordered santyl  _______________________________________________________________________________________________________________________________________________________________________________________________________________________________________________    Pressure Injury Location: [facility]    8/29    Last photo: 8/29  Wound type: Pressure Injury     Pressure Injury Stage: either Stage 2 or 3 deferring definitive staging until wound base has evolved, present on admission     Wound history/plan of care:   jaun as above    Wound base: 50 % Fibrin, 50 % Epidermis     Palpation of the wound bed:  unknown       Drainage: scant     Description of drainage: serosanguinous per nurse report     Measurements (length x width x depth, in cm) BRIAN     Tunneling N/A     Undermining N/A  Periwound skin: Erythema- blanchable      Color: dusky and pink      Temperature: unknown  Odor: none  Pain: tension to hands, feet and body, facial expression of distress, and during dressing change, per nurse report  Pain intervention prior to dressing change: slow and gentle cares   Treatment goal: Protection and Promote epidermal migration  STATUS: initial assessment  Supplies ordered: supplies stored on unit    My PI Risk Assessment     Sensory Perception: 3 - Slightly Limited     Moisture: 2 - Very moist      Activity: 1 - Bedfast      Mobility: 2 - Very limited     Nutrition: 2 - Probably inadequate      Friction/Shear: 2 - Potential problem      TOTAL: 12       Treatment Plan:     Erinyx wound: Every 3 days   Cleanse the area with NS and pat dry.  Apply No sting film barrier to periwound skin.  Cover wound with Sacral Mepilex (#[id number]),   Change dressing Q 3 days.  Turn and reposition Q 2hrs side to side only.  Ensure pt has Elliot-cushion  "while sitting up in the chair.  FYI- If pt has constant incontinent loose stools needing dressing changes Q shift please discontinue the Mepilex dressing and apply criticaid barrier paste BID and PRN.    Spine  Soak wound with vashe moistened gauze for 5 minutes, pat dry  Apply cavilon no sting barrier film to periwound skin  Apply nickel thick layer of santyl ointment to wound  Cover with xeroform, secure with abd pad and tape  Change dressing daily + PRN if soiled, saturated, falls off    Pressure Injury Prevention (PIP) Plan:  If patient is declining pressure injury prevention interventions: Explore reason why and address patient's concerns, Educate on pressure injury risk and prevention intervention(s), If patient is still declining, document \"informed refusal\" , and Ensure Care team is aware ( provider, charge nurse, etc)  HOB: Maintain at or below 30 degrees, unless contraindicated  Repositioning in bed: Every 1-2 hours , Left/right positioning; avoid supine, Raise foot of bed prior to raising head of bed, to reduce patient sliding down (shear), and Frequent microturns using positioning wedges, as patient tolerates  Heels: Keep elevated off mattress and Pillows under calves  Protective Dressing: Sacral Mepilex for prevention (#424986),  especially for the agitated patient   Positioning Equipment:Positioning wedges (#936209) to help maintain 30 degree side lying position   Chair positioning: Chair cushion (#136942)    If patient has a buttock pressure injury, or high risk for PI use chair cushion or SPS.  Moisture Management: Perineal cleansing /protection: Follow Incontinence Protocol, Avoid brief in bed, Clean and dry skin folds with bathing , Consider InterDry (#721002) between folds, and Moisturize dry skin  Under Devices: Inspect skin under all medical devices during skin inspection , Ensure tubes are stabilized without tension, and Ensure patient is not lying on medical devices or equipment when " repositioned  Ask provider to discontinue device when no longer needed.          Orders: Written    RECOMMEND PRIMARY TEAM ORDER: None, at this time  Education provided: importance of repositioning, plan of care, and Off-loading pressure  Discussed plan of care with: Nurse  [facility] nurse follow-up plan: weekly  Notify WOC if wound(s) deteriorate.  Nursing to notify the Provider(s) and re-consult the [facility] Nurse if new skin concern.    DATA:     Current support surface: Standard  Low air loss (MAJO pump, Isolibrium, Pulsate)  Containment of urine/stool: Incontinence Protocol  BMI: There is no height or weight on file to calculate BMI.   Active diet order: Orders Placed This Encounter      Renal Diet (dialysis)     Output: I/O last 3 completed shifts:  In: 3216.83 [P.O.:180; I.V.:2236.83; IV Piggyback:500]  Out: 0      Labs:   Recent Labs   Lab 08/29/24  1336 08/29/24  1257   ALBUMIN  --  3.0*   HGB 7.2* 7.0*   INR 2.19*  --    WBC 10.2 10.4     Pressure injury risk assessment:   Sensory Perception: 3-->slightly limited  Moisture: 3-->occasionally moist  Activity: 1-->bedfast  Mobility: 2-->very limited  Nutrition: 2-->probably inadequate  Friction and Shear: 1-->problem  Aristides Score: 12    KHUSHI SteeleN RN CWOCN  Pager no longer in use, please contact through GetApp group: [facility] [employer]

## 2024-08-29 NOTE — PLAN OF CARE
Goal Outcome Evaluation:  RRT called midday d/t hypotension after coming back from CT. Patient alert and talking but MAPs 40, levophed titrated up to 0.21 with potential to start vaso. Vaso is ordered but never initiated. Levophed titrated to meet MAP goal of >65 mmHg. Hgb 7.0, 1 unit PRBC administered with dialysis run. Atrial fibrillation (HR 60s-100s) with frequent PVCs on tele, amio infusing. Poor PO intake. Pt has not been utilizing his dentures lately, so easy to chew foods are best for him. Enjoying supplements added by dietary. Q2h turns to maintain skin integrity. Mepilex changed this morning. Weeping of right upper extremity from venous puncture. Pain controlled with scheduled tylenol. NWB to left upper and lower extremity. ACE dressing on upper extremity loosened by ortho. Splint remains in place. ACE wrap removed by ortho on left lower extremity. Immobilizer in place. Tolerated HD run (no fluid pulled) without increase in pressor  support.   Blood glucoses AC/HS, inaccurate finger sticks with edema. Glucoses completed via line. D10 @ 50mL/hr.   Updated son x2 today via phone.    Gracie Gramajo RN                  Problem: Adult Inpatient Plan of Care  Goal: Absence of Hospital-Acquired Illness or Injury  Intervention: Identify and Manage Fall Risk  Recent Flowsheet Documentation  Taken 8/29/2024 1600 by Gracie Gramajo RN  Safety Promotion/Fall Prevention:   safety round/check completed   room near nurse's station   room door open   clutter free environment maintained   patient and family education  Taken 8/29/2024 1200 by Gracie Gramajo RN  Safety Promotion/Fall Prevention:   safety round/check completed   room near nurse's station   room door open   clutter free environment maintained   patient and family education  Taken 8/29/2024 0800 by Gracie Gramajo RN  Safety Promotion/Fall Prevention:   safety round/check completed   room near nurse's station   room door open  Intervention: Prevent Skin  Injury  Recent Flowsheet Documentation  Taken 8/29/2024 1600 by Gracie Gramajo RN  Body Position:   turned   right  Taken 8/29/2024 1400 by Gracie Gramjao RN  Body Position:   turned   left  Taken 8/29/2024 1200 by Gracie Gramajo RN  Body Position: (rrt)   turned   supine  Taken 8/29/2024 1000 by Gracie Gramajo RN  Body Position:   turned   right  Taken 8/29/2024 0800 by Gracie Gramajo RN  Body Position:   turned   left  Intervention: Prevent and Manage VTE (Venous Thromboembolism) Risk  Recent Flowsheet Documentation  Taken 8/29/2024 1600 by Gracie Gramajo RN  VTE Prevention/Management: patient refused intervention  Taken 8/29/2024 1200 by Gracie Gramajo RN  VTE Prevention/Management: patient refused intervention  Taken 8/29/2024 0800 by Gracie Gramajo RN  VTE Prevention/Management: patient refused intervention  Intervention: Prevent Infection  Recent Flowsheet Documentation  Taken 8/29/2024 1600 by Gracie Gramajo RN  Infection Prevention: hand hygiene promoted  Taken 8/29/2024 1200 by Gracie Gramajo RN  Infection Prevention: hand hygiene promoted  Taken 8/29/2024 0800 by Gracie Gramajo RN  Infection Prevention: hand hygiene promoted     Problem: Dysrhythmia  Goal: Normalized Cardiac Rhythm  Outcome: Not Progressing  Intervention: Monitor and Manage Cardiac Rhythm Effect  Recent Flowsheet Documentation  Taken 8/29/2024 1600 by Gracie Gramajo RN  VTE Prevention/Management: patient refused intervention  Taken 8/29/2024 1200 by Gracie Gramajo RN  VTE Prevention/Management: patient refused intervention  Taken 8/29/2024 0800 by Gracie Gramajo RN  VTE Prevention/Management: patient refused intervention     Problem: Sepsis/Septic Shock  Goal: Blood Glucose Level Within Targeted Range  Outcome: Progressing

## 2024-08-29 NOTE — SIGNIFICANT EVENT
Cross cover, called for persistent hypotension, possible septic shock, so far he had about 2 L of fluids, ESRD on dialysis, tele intensivist consulted, discussed with them over the phone.  Recommended central line placement, pressor support.  PICC unable to place PICC line secondary to dialysis catheter on the right.  Request anesthesia to place central line.  Until then pressors with peripheral line.  Started on midodrine, albumin per ICU service    Julianne Sahu MD  Hospitalist

## 2024-08-29 NOTE — PROGRESS NOTES
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  Runs now at [facility] under Cares of Dr Helm  MWF 3.5 TT, K3 bath  Has CVC and arm access which is currently not being utilized due to arm fracture of left arm.      Pt only with half run on Monday, not run on Wed  Plans to run today on HD.   Due to low BP, will only utilize a low UF  Pt is volume up, will need to consider if extra run needed tomorrow (and would get back on MWF schedule?)      Hypokalemia  Low recently since mid August, available historical labs normal-high  In dialysis clinic has been encouraged to increase dietary K   Is on K3 bath typically   On potassium protocols now.   RD consulted      MBD-CKD - phos at goal, calcium with correction lower end of normal     HTN - metoprolol  Hypotensive,and now on pressors     DM2 - no noted diabetic medications. Hypoglycemia noted, ER/hospital following.       Afib / Vtach - metoprolol  S/p amiodarone infusion   Cardiology following     HLD- statin     Thyroid disease - levothyroxine   TSH very high, ? Taking medication as Rx PTA at TCU?     SUBJECTIVE:    Seen in room  Says he is feeling much better today  Subjectively to me he looks much better  Breathing on own  BP better now  No NVD,   He reports urinating but not gone today    Getting set up for HD and plans to do slow run      OBJECTIVE:  Physical Exam   Temp: 97.9  F (36.6  C) Temp src: Oral BP: 93/52 Pulse: 84   Resp: 12 SpO2: 98 % O2 Device: None (Room air) Oxygen Delivery: 2 LPM  There were no vitals filed for this visit.  Vital Signs with Ranges  Temp:  [97.5  F (36.4  C)-98  F (36.7  C)] 97.9  F (36.6  C)  Pulse:  [] 84  Resp:  [0-24] 12  BP: ()/(48-76) 93/52  SpO2:  [75 %-100 %] 98 %  I/O last 3 completed shifts:  In: 3332.17 [I.V.:1832.17; IV Piggyback:1500]  Out: -         No data found.[unfilled]    PHYSICAL EXAM:  General:  in bed, awake, alert, orientd. Cheerful  HEENT:  Pupils equal, round, no scleral icterus  NECK:  no carotid bruits, no JVD  RESPIRATORY:  Lungs with crackle on ant auscultation, normal effort , room air   CARDIOVASCULAR:  RRR, no murmur  VOLUME: hard tense edema lower legs   ABDOMEN:  soft, BS present, non-tender, non-distended   GENITOURINARY:  No sanches   INTEGUMENTARY: Warm, dry, no rash  NEUROLOGIC: grossly intact   Psych: calm, cooperative  ACCESS: cvc    LABORATORY STUDIES:     Recent Labs   Lab 08/29/24  0113 08/28/24  1231 08/27/24  0728   WBC 7.4 12.7* 10.7   RBC 3.42* 2.89* 2.45*   HGB 11.2* 9.5* 8.0*   HCT 32.7* 29.0* 24.9*    469* 333       Basic Metabolic Panel:  Recent Labs   Lab 08/29/24  0606 08/29/24  0113 08/28/24  2130 08/28/24  1927 08/28/24  1918 08/28/24  1639 08/28/24  1248 08/28/24  1231 08/27/24  0728   *  --   --  132*  --   --   --  133*  132* 134*   POTASSIUM 2.8*  2.8* 2.7*  --  2.7*  --   --   --  3.3*  3.6 2.5*   CHLORIDE 93*  --   --  93*  --   --   --  90*  90* 92*   CO2 26  --   --  23  --   --   --  21*  21* 30*   BUN 38.0*  --   --  36.5*  --   --   --  36.0*  34.7* 28.9*   CR 3.96*  --   --  3.83*  --   --   --  3.99*  3.86* 3.30*   GLC 87  87 105* 122* 127* 141* 152*   < > 98  64* 59*   MELISSA 7.5*  --   --  7.4*  --   --   --  7.4*  7.9* 7.3*    < > = values in this interval not displayed.       INR  Recent Labs   Lab 08/29/24  0113 08/28/24  1812 08/28/24  1231 08/28/24  0706   INR 5.81* 5.98* 4.58* 5.12*        Recent Labs   Lab Test 08/29/24  0113 08/28/24  1812 08/28/24  1231   INR 5.81* 5.98* 4.58*   WBC 7.4  --  12.7*   HGB 11.2*  --  9.5*     --  469*       Personally reviewed current labs       Vania De Paz PA-C  [facility]  [phone]

## 2024-08-29 NOTE — CONSULTS
Thank you,  No ref. provider found, for asking the [facility] Care team to see jIeoma Polk to evaluate atrial fibrillation.      Assessment/Recommendations   Assessment:    Sepsis syndrome/shock on vasopressors  Permanent atrial fibrillation with accelerated ventricular response due to sepsis  Wide QRS  complex tachycardia, short runs, probably nonsustained VT, aberrant conduction of A-fib less likely  End-stage renal disease on dialysis  Hypokalemia.   -Replace with potassium replacement protocol  Morbid obesity status post gastric bypass   Recent fall with multiple fractures  Diabetes mellitus  Troponin elevation, type II injury/decreased clearance  History of hypertension    Plan:  IV amiodarone to control ventricular response rate and suppress wide QRS complex tachycardia then switch to rate control medications when blood pressure more stable    Echocardiogram    Avoid hypokalemia if possible          Clinically Significant Risk Factors Present on Admission        # Hypokalemia: Lowest K = 2.7 mmol/L in last 2 days, will replace as needed     # Hypocalcemia: Lowest iCa = 4.1 mg/dL in last 2 days, will monitor and replace as appropriate    # Anion Gap Metabolic Acidosis: Highest Anion Gap = 22 mmol/L in last 2 days, will monitor and treat as appropriate  # Hypoalbuminemia: Lowest albumin = 2.7 g/dL at 8/29/2024  6:06 AM, will monitor as appropriate  # Drug Induced Coagulation Defect: home medication list includes an anticoagulant medication    # Drug Induced Platelet Defect: home medication list includes an antiplatelet medication       # Hypertension: Noted on problem list     # Circulatory Shock: required vasopressors within past 24 hours             # Severe Malnutrition: based on nutrition assessment                         History of Present Illness/Subjective    Mr. Ijeoma Polk is a 74 year old male who was admitted from nephrology because of hypotension and somnolence.  He required  "vasopressors for blood pressure maintenance.  He feels better this morning.  He denies chest pain or shortness of breath.  He has permanent atrial fibrillation.  Ventricular rate was elevated on presentation to emergency room.  He had also short runs of wide QRS tachycardia.  He was started on amiodarone  He feels better this morning  He has a long and complicated medical history.  Most recently he had a fall and multiple fractures.  He has seen cardiologist   in [place] in the past.  He has no history of known obstructive coronary artery disease    ECG: Personally reviewed.  A-fib with RVR nonspecific ST-T abnormalities.    ECHO (personnaly Reviewed): Pending         Physical Examination Review of Systems   BP 93/52   Pulse 84   Temp 97.9  F (36.6  C) (Oral)   Resp 12   SpO2 98%   There is no height or weight on file to calculate BMI.  Wt Readings from Last 3 Encounters:   08/27/24 76.2 kg (168 lb)   08/21/24 80.3 kg (177 lb)   08/18/24 80.3 kg (177 lb)       Intake/Output Summary (Last 24 hours) at 8/29/2024 1034  Last data filed at 8/29/2024 0800  Gross per 24 hour   Intake 3382.17 ml   Output --   Net 3382.17 ml     General Appearance:   Ill-appearing   Head/ENT: Normocephalic, without obvious abnormality. Membranes moist.      EYES:  No scleral icterus   Neck: Supple   Chest/Lungs:   Distant breath sounds   Cardiovascular:   irregular rhythm, S1, S2 normal, no murmur, rub or gallop.   Abdomen:  Soft, non-tender, bowel sounds active all four quadrants,  no masses, no organomegaly                    10 system review of systems completed see history of present illness and inpatient H&P (reviewed) for further details.          Lab Results    Chemistry/lipid CBC Cardiac Enzymes/BNP/TSH/INR   No results for input(s): \"CHOL\", \"HDL\", \"LDL\", \"TRIG\", \"CHOLHDLRATIO\" in the last 73648 hours.  No results for input(s): \"LDL\" in the last 38547 hours.  Recent Labs   Lab Test 08/29/24  0606   *   POTASSIUM 2.8*  2.8* " "  CHLORIDE 93*   CO2 26   GLC 87  87   BUN 38.0*   CR 3.96*   GFRESTIMATED 15*   MELISSA 7.5*     Recent Labs   Lab Test 08/29/24  0606 08/28/24  1927 08/28/24  1231   CR 3.96* 3.83* 3.99*  3.86*     Recent Labs   Lab Test 08/01/22  1334   A1C 4.8          Recent Labs   Lab Test 08/29/24  0113   WBC 7.4   HGB 11.2*   HCT 32.7*   MCV 96        Recent Labs   Lab Test 08/29/24  0113 08/28/24  1231 08/27/24  0728   HGB 11.2* 9.5* 8.0*    No results for input(s): \"TROPONINI\" in the last 02003 hours.  Recent Labs   Lab Test 08/28/24  1231   NTBNPI 39,340*     Recent Labs   Lab Test 08/28/24  1231   TSH 16.20*     Recent Labs   Lab Test 08/29/24  0113 08/28/24  1812 08/28/24  1231   INR 5.81* 5.98* 4.58*        Medical History  Surgical History Family History Social History   Past Medical History:   Diagnosis Date     Chronic diarrhea      End stage renal disease (H)      H/O gastric bypass      History of bowel resection      Hypertension, surgical complication 3/9/2009     Hypokalemia      Membranous nephropathy determined by biopsy      PAF (paroxysmal atrial fibrillation) (H)      Type 2 diabetes mellitus without complication, without long-term current use of insulin (H) 08/10/2022      No premature CAD, SCD,cardiomyopathy   Social History     Socioeconomic History     Marital status:      Spouse name: Not on file     Number of children: Not on file     Years of education: Not on file     Highest education level: Not on file   Occupational History     Not on file   Tobacco Use     Smoking status: Never     Smokeless tobacco: Never   Substance and Sexual Activity     Alcohol use: Not Currently     Drug use: Never     Sexual activity: Not on file   Other Topics Concern     Not on file   Social History Narrative     Not on file     Social Determinants of Health     Financial Resource Strain: Low Risk  (2/9/2024)    Received from [facility]    Overall Financial Resource Strain (CARDIA)      " Difficulty of Paying Living Expenses: Not hard at all   Food Insecurity: No Food Insecurity (8/9/2024)    Received from [facility]    Hunger Vital Sign      Worried About Running Out of Food in the Last Year: Never true      Ran Out of Food in the Last Year: Never true   Transportation Needs: No Transportation Needs (8/9/2024)    Received from [facility]    Transportation Needs      In the past 12 months, has lack of transportation kept you from medical appointments, meetings, work, or from getting medicines or things needed for daily living?: No   Physical Activity: Insufficiently Active (1/1/2024)    [facility]    Exercise Vital Sign      Days of Exercise per Week: 2 days      Minutes of Exercise per Session: 10 min   Stress: No Stress Concern Present (1/1/2024)    [facility]    [employer] [place] [employer] - Occupational Stress Questionnaire      Feeling of Stress : Not at all   Social Connections: Moderately Integrated (1/1/2024)    [facility]    Social Connection and Isolation Panel [NHANES]      Frequency of Communication with Friends and Family: More than three times a week      Frequency of Social Gatherings with Friends and Family: More than three times a week      Attends Restorationist Services: More than 4 times per year      Active Member of Clubs or Organizations: Yes      Attends Club or Organization Meetings: More than 4 times per year      Marital Status:    Interpersonal Safety: Not At Risk (8/9/2024)    Received from [facility] and [facility]    Intimate Partner Violence      Are you in a relationship where you are physically hurt, threatened  and/or made to feel afraid?: No   Housing Stability: Low Risk  (8/9/2024)    Received from [facility]    Housing Stability Vital Sign      Unable to Pay for Housing in the Last Year: No      Number of Times Moved in the Last Year: 0      Homeless in the Last Year: No         Medications  Allergies   Current Facility-Administered Medications Ordered in Epic   Medication Dose Route Frequency Provider Last Rate Last Admin     acetaminophen (TYLENOL) tablet 1,000 mg  1,000 mg Oral TID Jose Hamilton MD   1,000 mg at 08/28/24 1942     alteplase (CATHFLO ACTIVASE) injection 2 mg  2 mg Intracatheter Q1H PRN Vania De Paz PA-C         alteplase (CATHFLO ACTIVASE) injection 2 mg  2 mg Intracatheter Q1H PRN Vania De Paz PA-C         amiodarone (NEXTERONE) 1.8 mg/mL in dextrose 5% 200 mL ADULT STANDARD infusion  0.5 mg/min Intravenous Continuous Latanya Auguste MD 16.7 mL/hr at 08/29/24 0800 0.5 mg/min at 08/29/24 0800     artificial saliva (BIOTENE MT) solution 1 spray  1 spray Mouth/Throat 4x Daily PRN Jose Hamilton MD         aspirin EC tablet 81 mg  81 mg Oral BID Jose Hamilton MD   81 mg at 08/28/24 1942     atorvastatin (LIPITOR) tablet 10 mg  10 mg Oral At Bedtime Jose Hamilton MD   10 mg at 08/28/24 2213     benzocaine-menthol (CEPACOL) 15-3.6 MG lozenge 1 lozenge  1 lozenge Buccal Q1H PRN Jose Hamilton MD         calcium carbonate (TUMS) chewable tablet 1,000 mg  1,000 mg Oral 4x Daily PRN Jose Hamilton MD         ceFEPIme (MAXIPIME) 2 g vial to attach to  mL bag for ADULTS or NS 50 mL bag for PEDS  2 g Intravenous Q24H Jose Hamilton MD         dextrose 10% infusion   Intravenous Continuous Hardeep Major DO 50 mL/hr at 08/29/24 1014 $Started at 08/29/24 1014     dextrose 50 % injection              levothyroxine (SYNTHROID/LEVOTHROID) tablet 50 mcg  50 mcg Oral QAM AC Jose Hamilton MD   50 mcg at 08/29/24 0928     lidocaine (LMX4) cream   Topical Q1H PRN Jose Hamilton  MD FERNANDA         lidocaine (LMX4) cream   Topical Q1H PRN Julianne Sahu MD         lidocaine 1 % 0.1-1 mL  0.1-1 mL Other Q1H PRN Jose Hamilton MD         lidocaine 1 % 0.1-5 mL  0.1-5 mL Other Q1H PRN Julianne Sahu MD         lidocaine-prilocaine (EMLA) cream   Topical Daily PRN Jose Hamilton MD         melatonin tablet 1 mg  1 mg Oral At Bedtime PRN Jose Hamilton MD         [Held by provider] methocarbamol (ROBAXIN) tablet 750 mg  750 mg Oral TID Jose Hamilton MD   750 mg at 08/28/24 1942     metoprolol (LOPRESSOR) injection 5 mg  5 mg Intravenous Q5 Min PRN Hardeep Major DO   5 mg at 08/28/24 1424     midodrine (PROAMATINE) tablet 10 mg  10 mg Oral TID w/meals Idania Crenshaw MD   10 mg at 08/29/24 0928     naloxone (NARCAN) injection 0.2 mg  0.2 mg Intravenous Q2 Min PRN Hardeep Major DO        Or     naloxone (NARCAN) injection 0.4 mg  0.4 mg Intravenous Q2 Min PRN Hardeep Major DO        Or     naloxone (NARCAN) injection 0.2 mg  0.2 mg Intramuscular Q2 Min PRN Hardeep Major DO        Or     naloxone (NARCAN) injection 0.4 mg  0.4 mg Intramuscular Q2 Min PRN Hardeep Major DO         norepinephrine (LEVOPHED) 16 mg in sodium chloride 0.9 % 250 mL infusion CENTRAL  0.01-0.6 mcg/kg/min Intravenous Continuous Idania Crenshaw MD 2.1 mL/hr at 08/29/24 1017 0.03 mcg/kg/min at 08/29/24 1017     ondansetron (ZOFRAN ODT) ODT tab 4 mg  4 mg Oral Q6H PRN Jose Hamilton MD        Or     ondansetron (ZOFRAN) injection 4 mg  4 mg Intravenous Q6H PRN Jose Hamilton MD         oxyBUTYnin (DITROPAN) tablet 5 mg  5 mg Oral QAM Jose Hamilton MD   5 mg at 08/29/24 0928     oxyCODONE (ROXICODONE) tablet 5 mg  5 mg Oral Q4H PRN Hardeep Major DO         pantoprazole (PROTONIX) EC tablet 40 mg  40 mg Oral BID Jose Hamilton MD   40 mg at 08/29/24 0928     phytonadione (MEPHYTON) half-tab 2.5 mg  2.5 mg Oral Once Mariah Liz MD         polyethylene glycol (MIRALAX) Packet 17 g  17 g Oral Daily Jose Hamilton MD          prochlorperazine (COMPAZINE) injection 5 mg  5 mg Intravenous Q6H PRN Jose Hamilton MD        Or     prochlorperazine (COMPAZINE) tablet 5 mg  5 mg Oral Q6H PRN Jose Hamilton MD        Or     prochlorperazine (COMPAZINE) suppository 12.5 mg  12.5 mg Rectal Q12H PRN Jose Hamilton MD         senna-docusate (SENOKOT-S/PERICOLACE) 8.6-50 MG per tablet 1 tablet  1 tablet Oral BID PRN Jose Hamilton MD        Or     senna-docusate (SENOKOT-S/PERICOLACE) 8.6-50 MG per tablet 2 tablet  2 tablet Oral BID PRN Jose Hamilton MD         sodium chloride (PF) 0.9% PF flush 10 mL  10 mL Intracatheter Q15 Min PRN Vania De Paz PA-C         sodium chloride (PF) 0.9% PF flush 10 mL  10 mL Intracatheter Q15 Min PRN Vania De Paz PA-C         sodium chloride (PF) 0.9% PF flush 10-40 mL  10-40 mL Intracatheter Once PRN Julianne Sahu MD         sodium chloride (PF) 0.9% PF flush 3 mL  3 mL Intracatheter q1 min prn Hardeep Major, DO         sodium chloride (PF) 0.9% PF flush 3 mL  3 mL Intracatheter Q8H Hardeep Major, DO   3 mL at 08/29/24 0608     sodium chloride (PF) 0.9% PF flush 3 mL  3 mL Intracatheter Q8H Jose Hamilton MD   3 mL at 08/29/24 0931     sodium chloride (PF) 0.9% PF flush 3 mL  3 mL Intracatheter q1 min prn Jose Hamilton MD         sodium chloride (PF) 0.9% PF flush 9 mL  9 mL Intracatheter During Dialysis/CRRT (from stock) Vania De Paz PA-C         sodium chloride (PF) 0.9% PF flush 9 mL  9 mL Intracatheter During Dialysis/CRRT (from stock) Vania De Paz PA-C         sodium chloride 0.9% BOLUS 100-150 mL  100-150 mL Intravenous Q15 Min PRN Vania De Paz PA-C         sodium chloride 0.9% BOLUS 250 mL  250 mL Intravenous Once in dialysis/CRRT Vania De Paz PA-C         sodium chloride 0.9% BOLUS 300 mL  300 mL Hemodialysis Machine Once Vania De Paz PA-C         urea (GORMEL) 20 % cream CREA   Topical Daily Jose Hamilton MD   Given at 08/29/24 0931     vancomycin place maxwell - receiving  intermittent dosing  1 each Intravenous See Admin Instructions Hardeep Major DO         [Held by provider] Warfarin Dose Required Daily - Pharmacist Managed  1 each Oral See Admin Instructions Jose Hamilton MD         wound support modular (EXPEDITE) bottle 60 mL  60 mL Oral Daily Orlando Villasenor, MARY         No current [facility]-ordered outpatient medications on file.       Allergies   Allergen Reactions     Furosemide Hives     Lorazepam Rash     Penicillins Rash     Sulfadiazine Rash

## 2024-08-29 NOTE — PROGRESS NOTES
[facility]    ICU Progress Note       Date of Admission:  8/28/2024    Assessment: Critical Care   Ijeoma Polk is a 74 year old male admitted on 8/28/2024. He has a past medical history of ESRD (hypertensive nephrosclerosis, membranous nephropathy on biopsy) with dialysis MWF, hypertension, atrial fibrillation on warfarin, anemia of chronic disease, diabetes mellitus type 2 without use of insulin, recent mechanical fall with multiple fractures s/p ORIF in [place]. He presented to ED from dialysis in the setting of hypotension and found to be in septic shock with lactic acidosis and started on empiric antibiotics. Infection source most likely various pressure wounds on thoracic spine, sacrum, and R elbow. He has also had nonsustained vtach which may be due to electrolyte disturbances.      Plan: Critical Care   Neuro/ pain/ sedation:  - Monitor neurological status. Notify provider for any acute changes in exam  - Monitor pain control: PRN acetaminophen 1g TID, PO oxycodone 5 mg PRN    Pulmonary:  - CXR showed bilateral pleural effusions, patient on RA  - supplemental oxygen to keep saturation about 92%  - Monitor fluid status to avoid overloading and worsening pleural effusion    Cardiovascular:  - Monitor hemodynamic status in setting of septic shock, nonsustained ventricular tachycardia, and afib with RVR.  - Cardiology consulted and following  - Consider stress dose steroids if continued hypotension despite pressor use  - Echocardiogram results pending  - Received IV metoprolol in ED, PRN IV metoprolol for rates >100   - IV norepinephrine infusion with MAP goal >65  - IV amiodarone 0.5 mg/min  - Continue home PO midodrine 10 mg     GI/Nutrition:  - Diet: Renal Diet (dialysis)    - Nutrition consulted, calorie counting. Appreciate recommendations.   - History of gastric bypass and bowel resection, short gut syndrome, chronic diarrhea  - Consider imodium for diarrhea control,  discontinue daily miralax if diarrhea continues  - PO Protonix 40 mg BID    Renal/ Fluid Balance:   - Will monitor intake and output  - D10 for IV fluid hydration  - ICU electrolyte replacement protocol  - Nephrology consulted for dialysis and ESRD management, planning for dialysis today 8/29/24.    Endocrine:  - Type 2 DM without insulin use, chronic hypothyroidism  - IV D10 50 mL/h continuous  - Glucose checks before meals and at bedtime  - Continue home levothyroxine    ID:  - Presumed septic shock due to cellulitis associated with chronic pressure wounds (thoracic, sacral, R elbow)  - Repeat blood culture  - Empiric cefepime and vancomycin  - WOC consulted    Hematology:  - Supratherapetic INR (5.81 this morning), anemia of chronic disease  - PO 2.5 mg Vit K once today  - Hold warfarin    MSK:   - Recent mechanical fall with multiple fractures  - Consult orthopedics for fracture management  - PT and OT consulted. Appreciate recommendations.       Lines/ tubes/ drains:  Dasilva Catheter: Not present  Lines: PRESENT      CVC Triple Lumen Right Femoral-Site Assessment: WDL        Prophylaxis:  - DVT Prophylaxis: VTE Prophylaxis contraindicated due to supratherapeutic INR, SCDs in place  - PUD Prophylaxis: Protonix 40 BID    Code Status: Full Code      Disposition:  - Previously at [facility], would like alternative placement upon discharge  - ICU, patient requiring vasopressors    The patient's care was discussed with the Attending Physician, Dr. Liz .    Rosalia Kelley MD  [facility]  Securely message with [employer] (more info)  Text page via [employer] Paging/Directory     Clinically Significant Risk Factors Present on Admission        # Hypokalemia: Lowest K = 2.7 mmol/L in last 2 days, will replace as needed   # Hypocalcemia: Lowest iCa = 4.1 mg/dL in last 2 days, will monitor and replace as appropriate    # Anion Gap Metabolic Acidosis: Highest Anion Gap = 22 mmol/L in last 2 days,  will monitor and treat as appropriate  # Hypoalbuminemia: Lowest albumin = 2.7 g/dL at 2024  6:06 AM, will monitor as appropriate    # Drug Induced Coagulation Defect: home medication list includes an anticoagulant medication  # Drug Induced Platelet Defect: home medication list includes an antiplatelet medication   # Hypertension: Noted on problem list   # Circulatory Shock: required vasopressors within past 24 hours                            __________________________________________________________________    Interval History   Patient states he slept well which has made him feel much better compared to days past, no acute pain reported by patient. Overnight patient had nonsustained vtach which has decreased in frequency following initiation of amiodarone infusion. Nephrology following and patient is to have dialysis today. BP has been improving on norepinephrine.     Physical Exam   Vital Signs: Temp: 97.9  F (36.6  C) Temp src: Oral BP: 93/52 Pulse: 84   Resp: 12 SpO2: 98 % O2 Device: None (Room air) Oxygen Delivery: 2 LPM  Weight: 0 lbs 0 oz  Physical Exam  Constitutional:       General: He is awake. He is not in acute distress.  HENT:      Mouth/Throat:      Mouth: Mucous membranes are dry.      Comments: Edentulous   Cardiovascular:      Rate and Rhythm: Normal rate. Rhythm irregular.      Pulses: Normal pulses.   Pulmonary:      Effort: Pulmonary effort is normal. No respiratory distress.      Breath sounds: Normal breath sounds.   Abdominal:      General: Abdomen is flat.      Palpations: Abdomen is soft.   Musculoskeletal:      Right lower le+ Pitting Edema present.      Left lower le+ Pitting Edema present.      Comments: R femoral CVC in place, dressings C/D/I.  L arm and leg wrapped in bandage, C/D/I.         Data   I reviewed all medications, new labs and imaging results over the last 24 hours.    Arterial Blood Gases   No lab results found in last 7 days.    Complete Blood Count    Recent Labs   Lab 08/29/24  0113 08/28/24  1231 08/27/24  0728   WBC 7.4 12.7* 10.7   HGB 11.2* 9.5* 8.0*    469* 333       Basic Metabolic Panel  Recent Labs   Lab 08/29/24  0606 08/29/24  0113 08/28/24  2130 08/28/24  1927 08/28/24  1248 08/28/24  1231 08/27/24 0728   *  --   --  132*  --  133*  132* 134*   POTASSIUM 2.8*  2.8* 2.7*  --  2.7*  --  3.3*  3.6 2.5*   CHLORIDE 93*  --   --  93*  --  90*  90* 92*   CO2 26  --   --  23  --  21*  21* 30*   BUN 38.0*  --   --  36.5*  --  36.0*  34.7* 28.9*   CR 3.96*  --   --  3.83*  --  3.99*  3.86* 3.30*   GLC 87  87 105* 122* 127*   < > 98  64* 59*    < > = values in this interval not displayed.       Liver Function Tests  Recent Labs   Lab 08/29/24  0606 08/29/24 0113 08/28/24 1812 08/28/24 1231 08/28/24  0706   ALT  --   --   --  21  --    ALKPHOS  --   --   --  158*  --    BILITOTAL  --   --   --  0.6  --    ALBUMIN 2.7*  --   --  2.8*  --    INR  --  5.81* 5.98* 4.58* 5.12*       Pancreatic Enzymes  No lab results found in last 7 days.    Coagulation Profile  Recent Labs   Lab 08/29/24 0113 08/28/24 1812 08/28/24 1231 08/28/24  0706   INR 5.81* 5.98* 4.58* 5.12*   PTT  --   --  30  --        IMAGING:  Recent Results (from the past 24 hour(s))   Head CT w/o contrast    Narrative    EXAM: CT HEAD W/O CONTRAST  LOCATION: [facility]  DATE: 8/28/2024    INDICATION: altered mental status at HD  COMPARISON: None.  TECHNIQUE: Routine CT Head without IV contrast. Multiplanar reformats. Dose reduction techniques were used.    FINDINGS:  INTRACRANIAL CONTENTS: No intracranial hemorrhage, extraaxial collection, or mass effect.  No CT evidence of acute infarct. Mild to moderate presumed chronic small vessel ischemic changes. Left frontal and left occipital encephalomalacia/gliosis. Mild   generalized cerebral atrophy. Normal ventricles and sulci.     VISUALIZED ORBITS/SINUSES/MASTOIDS: No intraorbital abnormality. Mild  mucosal thickening scattered about the paranasal sinuses. No middle ear or mastoid effusion.    BONES/SOFT TISSUES: No acute abnormality.      Impression    IMPRESSION:  1.  No acute intracranial process.  2.  Chronic small vessel ischemic disease and generalized cerebral atrophy.  3.  Encephalomalacia in the left frontal and left occipital lobes.   XR Chest Port 1 View    Narrative    EXAM: XR CHEST PORT 1 VIEW  LOCATION: [facility]  DATE: 8/28/2024    INDICATION: tachycardia  COMPARISON: 10/5/2022      Impression    IMPRESSION: There is a tunneled dual lumen right IJ catheter with the tip over the right atrium. There are pleural effusions with atelectasis. Cannot assess for underlying airspace disease. No pneumothorax. The heart is not well assessed.   XR Pelvis Port 1/2 Views    Narrative    EXAM: XR PELVIS PORT 1/2 VIEWS  LOCATION: [facility]  DATE: 8/29/2024    INDICATION: Femoral central line placement verification  COMPARISON: None.      Impression    IMPRESSION: Right femoral catheter terminates in the right hemipelvis. No acute fracture or dislocation. Mild degenerative narrowing of bilateral hip joints. Significant bilateral iliofemoral arterial atherosclerotic calcification.

## 2024-08-29 NOTE — PROCEDURES
Told to place a central line by the ICU Hub via the nurse supervisor. The hospitalist obtained the permit, I reviewed the risks with the patient also.    This patient is a hemodialysis patient with a line in the right subclavian, a left upper arm fistula, a broken left arm/shoulder, an INR of 5.98, and a 18g iv placed in the left external jugular vein by the ED earlier.    I placed a right femoral vein 7F triple lumen catheter after sterile prep and drape under ultrasound guidance. After Lido 1% 5 ml injected, vessel found first stick, wire passed easily and catheter threaded easily with good blood flow from all ports. All ports flushed with NS and abdominal XR ordered for placement confirmation.

## 2024-08-29 NOTE — PROGRESS NOTES
[facility] Medicine PROGRESS NOTE      Identification/Summary:   Ijeoma Polk is a 74 year old male admitted on 8/28/2024. 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, atrial fibrillation on warfarin, anemia of chronic disease, diabetes mellitus type 2, recent fall and found with closed bicondylar fracture of distal end of left humerus and closed nondisplaced transverse fracture of left patella, who was recently at [facility] (after he sustained a mechanical fall and was found with rib fractures and sternal fracture, pulmonary nodules, rectal thickening with planned outpatient follow-up, and required OR for thrombectomy of left AV fistula, atrial fibrillation, documented history of drug-induced hemorrhagic disorder, who comes from outpatient  Nephrology clinic where he was noted to be hypotensive and somnolent. He did have half of his usual dialysis run on Monday.)     In the ER, he was hypotensive as well as tachycardic, met SIRS criteria with concern for sepsis with potential infection pressure wounds on limbs and in the back, given broad-spectrum antibiotics cefepime and vancomycin, and admitted.  Nephrology discussed in ER and plan for dialysis on 8/29.  Patient was also found with atrial fibrillation with RVR, given metoprolol pushes, eventually amiodarone drip. Needed vasopressors, central line.     This morning amiodarone stopped, levophed stopped. Low potassium this morning, WBC improved. INR of 5.8. hgb of 11.2 up from 9.5. Urine not infected. CT head was without acute disease.    Assessment and Plan:  Possible septic shock  Questionable pressure wounds as source?  Ordered CT chest/abd/pelvis to eval for other possible sources  Did have another episode of asymptomatic hypotension this pm, rapid response called  Acute anemia concerning for bleeding given elevated  INR  Continue pressors prn  Is on cefepime, vanco  Initial bld cx neg thus far  Follow cultures, cont abx  Continue midodrine scheduled  Appreciate intensivist help  Hold robaxin, decrease prn oxycodone to 5mg, says he's not sure if he has been taking this prior to admission, not getting now    Recent fall with sternal fracture, rib fractures, left humerus, left patella fracture  Recent small volume subarachnoid hemorrhage  Schedule tylenol, oxycodone prn but not needing opiates  Underwent ORIF humerus and patella on 8/12    A fib with RVR  Essential htn  Elevated troponin  Supratherapeutic INR  As outpt is on metop 50, warfarin  Treated with amiodarone drip yest due to hypotension  Consult cardiology  Echo  with global hypokinesis, EF around 40, mild MR, AI  Elevated trops suspected related to ckd  Holding warfarin, trend hgb  Holding metoprolol  Digoxin stopped last hosp stay due to bradycardia/pauses    ESRD  Hypokalemia   AV graft thrombosis, 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 Doesn't feel like he has to have bm. Leg pain on the left better after removing tight wrapping.    Physical Exam/Objective:  Gen: no acute distress, comfortable, alert, interactive, very pleasant but appears very ill  ENT: no scleral icterus  Pulm: lungs are clear anteriorly, breathing comfortably at rest in room air, diminished at bases, no wheezing  CV: irregular, tachy, moderate lower ext edema, mottling skin diffusely  GI: abdomen is soft, non-tender, non-distended with active bowel  sounds.  MSK: left arm in splint, ACE  Derm: lots of bruising, superficial opening over the thoracic spine with some minimal erythema surrounding, no purulent drainage, has granultion tissue that looks a little darker in the centers, buttocks wound is very superficial without evidence of infection  Psych: appropriate affect    Medications:   Current Facility-Administered Medications   Medication Dose Route Frequency Provider Last Rate Last Admin    acetaminophen (TYLENOL) tablet 1,000 mg  1,000 mg Oral TID Jose Hamilton MD   1,000 mg at 08/28/24 1942    aspirin EC tablet 81 mg  81 mg Oral BID Jose Hamilton MD   81 mg at 08/28/24 1942    atorvastatin (LIPITOR) tablet 10 mg  10 mg Oral At Bedtime Jose Hamilton MD   10 mg at 08/28/24 2213    ceFEPIme (MAXIPIME) 2 g vial to attach to  mL bag for ADULTS or NS 50 mL bag for PEDS  2 g Intravenous Q24H Jose Hamilton MD        levothyroxine (SYNTHROID/LEVOTHROID) tablet 50 mcg  50 mcg Oral QAM AC Jose Hamilton MD        methocarbamol (ROBAXIN) tablet 750 mg  750 mg Oral TID Jose Hamilton MD   750 mg at 08/28/24 1942    [Held by provider] metoprolol succinate ER (TOPROL XL) 24 hr tablet 50 mg  50 mg Oral At Bedtime Jose Hamilton MD   50 mg at 08/28/24 1824    midodrine (PROAMATINE) tablet 10 mg  10 mg Oral TID w/meals Idania Crenshaw MD   10 mg at 08/29/24 0012    No heparin via hemodialysis machine   Does not apply Once Vania De Paz PA-C        oxyBUTYnin (DITROPAN) tablet 5 mg  5 mg Oral QAM Jose Hamilton MD        pantoprazole (PROTONIX) EC tablet 40 mg  40 mg Oral BID Jose Hamilton MD   40 mg at 08/28/24 1942    polyethylene glycol (MIRALAX) Packet 17 g  17 g Oral Daily Jose Hamilton MD        potassium chloride 20 mEq in 50 mL intermittent infusion  20 mEq Intravenous Q1H Julianne Sahu MD 50 mL/hr at 08/29/24 0654 20 mEq at 08/29/24 0654    senna-docusate (SENOKOT-S/PERICOLACE) 8.6-50 MG per tablet 2 tablet  2 tablet Oral Daily Jose Hamilton MD         sodium chloride (PF) 0.9% PF flush 3 mL  3 mL Intracatheter Q8H Ashwini Gonzalez MD   3 mL at 08/29/24 0608    sodium chloride (PF) 0.9% PF flush 3 mL  3 mL Intracatheter Q8H Jose Hamilton MD   10 mL at 08/29/24 0216    sodium chloride (PF) 0.9% PF flush 9 mL  9 mL Intracatheter During Dialysis/CRRT (from stock) Vania De Paz PA-EL        sodium chloride (PF) 0.9% PF flush 9 mL  9 mL Intracatheter During Dialysis/CRRT (from stock) Vania De Paz PA-EL        sodium chloride 0.9% BOLUS 250 mL  250 mL Intravenous Once in dialysis/CRRT Vania De Paz PA-EL        sodium chloride 0.9% BOLUS 300 mL  300 mL Hemodialysis Machine Once Vania De Paz PA-C        urea (GORMEL) 20 % cream CREA   Topical Daily Jose Hamilton MD        vancomycin place maxwell - receiving intermittent dosing  1 each Intravenous See Admin Instructions Hardeep Major DO        [Held by provider] Warfarin Dose Required Daily - Pharmacist Managed  1 each Oral See Admin Instructions Jose Hamilton MD         Clinically Significant Risk Factors Present on Admission        # Hypokalemia: Lowest K = 2.7 mmol/L in last 2 days, will replace as needed   # Hypocalcemia: Lowest iCa = 4.1 mg/dL in last 2 days, will monitor and replace as appropriate    # Anion Gap Metabolic Acidosis: Highest Anion Gap = 22 mmol/L in last 2 days, will monitor and treat as appropriate  # Hypoalbuminemia: Lowest albumin = 2.7 g/dL at 8/29/2024  6:06 AM, will monitor as appropriate  # Drug Induced Coagulation Defect: home medication list includes an anticoagulant medication  # Drug Induced Platelet Defect: home medication list includes an antiplatelet medication   # Hypertension: Noted on problem list   # Circulatory Shock: required vasopressors within past 24 hours                              Hardeep Major DO   [facility]

## 2024-08-29 NOTE — PLAN OF CARE
Central line placed at bedside by anesthesia. Levo started and writer called to update Son Austin regarding this per his request. Patient reports being very tired and wanting to sleep. Was able to take small pills orally well and took oral potassium solution. Ordered IV potassium was new level is basically unchanged. No urine output or stool during shift. Denies pain. Bruised all over with very dry skin that is fragile. Magnesium, calcium gluconate, and albumin infused per orders. Amio running. Patient remains in afib with rates ranging 80s-120s at times. Intermittently the patient will alarm bradycardia in the 40s though does not sustain rates below 67. Blood pressures stable on levo drip. On room air. Intermittently desats to the low 80s though does not sustain and rebounds to the 90s after only a few seconds. Afebrile.

## 2024-08-29 NOTE — CONSULTS
Care Management Initial Consult    General Information  Assessment completed with: Patient,    Type of CM/SW Visit: Initial Assessment    Primary Care Provider verified and updated as needed:     Readmission within the last 30 days:        Reason for Consult: discharge planning  Advance Care Planning:            Communication Assessment  Patient's communication style: spoken language (English or Bilingual)    Hearing Difficulty or Deaf: no   Wear Glasses or Blind: no    Cognitive  Cognitive/Neuro/Behavioral: WDL                      Living Environment:   People in home: facility resident     Current living Arrangements:        Able to return to prior arrangements:         Family/Social Support:  Care provided by:    Provides care for:    Marital Status:   Support system:            Description of Support System:           Current Resources:   Patient receiving home care services:          Community Resources:    Equipment currently used at home: orthosis  Supplies currently used at home:      Employment/Financial:  Employment Status: retired        Financial Concerns:             Does the patient's insurance plan have a 3 day qualifying hospital stay waiver?  No    Lifestyle & Psychosocial Needs:  Social Determinants of Health     Food Insecurity: No Food Insecurity (8/9/2024)    Received from [facility]    Hunger Vital Sign     Worried About Running Out of Food in the Last Year: Never true     Ran Out of Food in the Last Year: Never true   Depression: Not at risk (5/14/2024)    Received from [facility]    PHQ-2     PHQ-2 Total: 0   Housing Stability: Low Risk  (8/9/2024)    Received from [facility]    Housing Stability Vital Sign     Unable to Pay for Housing in the Last Year: No     Number of Times Moved in the Last Year: 0     Homeless in the Last Year: No   Tobacco Use: Low Risk  (8/27/2024)    Patient History     Smoking  Tobacco Use: Never     Smokeless Tobacco Use: Never     Passive Exposure: Not on file   Financial Resource Strain: Low Risk  (2/9/2024)    Received from [facility]    Overall Financial Resource Strain (CARDIA)     Difficulty of Paying Living Expenses: Not hard at all   Alcohol Use: Not At Risk (1/1/2024)    [facility]    AUDIT-C     Frequency of Alcohol Consumption: Monthly or less     Average Number of Drinks: 1 or 2     Frequency of Binge Drinking: Never   Transportation Needs: No Transportation Needs (8/9/2024)    Received from [facility]    Transportation Needs     In the past 12 months, has lack of transportation kept you from medical appointments, meetings, work, or from getting medicines or things needed for daily living?: No   Physical Activity: Insufficiently Active (1/1/2024)    [facility]    Exercise Vital Sign     Days of Exercise per Week: 2 days     Minutes of Exercise per Session: 10 min   Interpersonal Safety: Not At Risk (8/9/2024)    Received from [facility]    Intimate Partner Violence     Are you in a relationship where you are physically hurt, threatened and/or made to feel afraid?: No   Stress: No Stress Concern Present (1/1/2024)    [facility]    [employer] [place] [employer] - Occupational Stress Questionnaire     Feeling of Stress : Not at all   Social Connections: Moderately Integrated (1/1/2024)    [facility]    Social Connection and Isolation Panel [NHANES]     Frequency of Communication with Friends and Family: More than three  times a week     Frequency of Social Gatherings with Friends and Family: More than three times a week     Attends Sikh Services: More than 4 times per year     Active Member of Clubs or Organizations: Yes     Attends Club or Organization Meetings: More than 4 times per year     Marital Status:    Health Literacy: Not on file       Functional Status:  Prior to admission patient needed assistance:              Mental Health Status:  Mental Health Status: No Current Concerns       Chemical Dependency Status:  Chemical Dependency Status: No Current Concerns             Values/Beliefs:  Spiritual, Cultural Beliefs, Sikh Practices, Values that affect care:                 Discussed  Partnership in Safe Discharge Planning  document with patient/family: No    Additional Information:  Met briefly with pt, pt admitted from [facility].  Per chart pt unhappy at facility.  Pt states family recently moved him from his apartment to [facility] [place].  Pt goes to dialysis in [place].  Care Management will continue to follow for discharge recommendations.     Next Steps: follow for discharge recommendations.    ADIN Yoo

## 2024-08-29 NOTE — PROVIDER NOTIFICATION
Blood pressures are now 70's to 80's with maps of 59 to 63. HR remains 80s to 160 with intermittent v-tach. cardiology being repaged. Have not spoke to them yet.

## 2024-08-29 NOTE — PHARMACY-VANCOMYCIN DOSING SERVICE
Pharmacy Vancomycin Note  Date of Service 2024  Patient's  1950   74 year old, male      Creatinine for last 3 days  2024:  7:28 AM Creatinine 3.30 mg/dL  2024: 12:31 PM Creatinine 3.86 mg/dL; 12:31 PM Creatinine 3.99 mg/dL;  7:27 PM Creatinine 3.83 mg/dL  2024:  6:06 AM Creatinine 3.96 mg/dL    Recent Vancomycin Levels (past 3 days)  2024:  6:06 AM Vancomycin 8.1 ug/mL    Vancomycin IV Administrations (past 72 hours)                     vancomycin (VANCOCIN) 1,500 mg in 0.9% NaCl 250 mL intermittent infusion (mg) 1,500 mg New Bag 24 1545                    Nephrotoxins and other renal medications (From now, onward)      Start     Dose/Rate Route Frequency Ordered Stop    24 1800  vancomycin (VANCOCIN) 1,500 mg in 0.9% NaCl 250 mL intermittent infusion         20 mg/kg × 76.2 kg  166.7 mL/hr over 90 Minutes Intravenous ONCE 24 1158      24 0758  vancomycin place maxwell - receiving intermittent dosing         1 each Intravenous SEE ADMIN INSTRUCTIONS 24 0759      24 0300  norepinephrine (LEVOPHED) 16 mg in sodium chloride 0.9 % 250 mL infusion CENTRAL         0.01-0.6 mcg/kg/min × 76.2 kg  0.7-42.9 mL/hr  Intravenous CONTINUOUS 24 0240                 Contrast Orders - past 72 hours (72h ago, onward)      Start     Dose/Rate Route Frequency Stop    24 1000  perflutren lipid microsphere (DEFINITY) injection SUSP 2 mL         2 mL Intravenous ONCE 24 0937            Interpretation of levels and current regimen:  Vancomycin level is reflective of subtherapeutic level    Has serum creatinine changed greater than 50% in last 72 hours: HD    Urine output:  diminished urine output    Renal Function: ARF on Dialysis      Plan:  Vancomycin Dosing in Hemodialysis: Pre-HD Level (mg/L) <15 (less than) redoes Vancomycin 20 mg/kg today after HD   Vancomycin monitoring method: Renal Replacement Therapy  Vancomycin therapeutic monitoring  goal: 15-20 mg/L  Pharmacy will check vancomycin levels as appropriate in 1-3 Days.  Serum creatinine levels will be ordered daily for the first week of therapy and at least twice weekly for subsequent weeks.    Anil CoreyD

## 2024-08-29 NOTE — PROGRESS NOTES
RT attended RRT. Pt alert and awake answering questions, not in any respiratory distress . Pt on RA sating 100%, RR 17, , BP 58/30. No respiratory intervention needed. RT  dismissed.

## 2024-08-30 NOTE — PROGRESS NOTES
[facility] Medicine PROGRESS NOTE      Identification/Summary:   Ijeoma Polk is a 74 year old male admitted on 8/28/2024. 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, atrial fibrillation on warfarin, anemia of chronic disease, diabetes mellitus type 2, recent fall and found with closed bicondylar fracture of distal end of left humerus and closed nondisplaced transverse fracture of left patella, who was recently at [facility] (after he sustained a mechanical fall and was found with rib fractures and sternal fracture, pulmonary nodules, rectal thickening with planned outpatient follow-up, and required OR for thrombectomy of left AV fistula, atrial fibrillation, documented history of drug-induced hemorrhagic disorder, who comes from outpatient  Nephrology clinic where he was noted to be hypotensive and somnolent. He did have half of his usual dialysis run on Monday.)     In the ER, he was hypotensive as well as tachycardic, met SIRS criteria with concern for sepsis with potential infection pressure wounds on limbs and in the back, given broad-spectrum antibiotics cefepime and vancomycin, and admitted.  Nephrology discussed in ER and plan for dialysis on 8/29.  Patient was also found with atrial fibrillation with RVR, given metoprolol pushes, eventually amiodarone drip. Needed vasopressors, central line.     CT performed due to unclear source of sepsis. Notable for large right pleural effusion, extensive endobronchial opacities, likely GERD with esophageal fluid. Had thoracentesis on morning of 8/30.     Assessment and Plan:  Possible septic shock  Acute on chronic anemia  Large rt pleural effusion, tapped on 8/30  Questionable pressure wound on thoracic spine as source vs aspiration pneumonia?  Still on levophed  Is on cefepime, vanco, solucortef, albuminx1 this morning  Initial  bld cx neg thus far  Follow cultures, cont abx  Continue midodrine scheduled  Appreciate intensivist help  Hold robaxin, decreased prn oxycodone to 5mg, says he's not sure if he has been taking this prior to admission, not getting now  Follow pleural fluid studies    Recent fall with sternal fracture, rib fractures, left humerus, left patella fracture  Recent small volume subarachnoid hemorrhage  Schedule tylenol, oxycodone prn but not needing opiates  Underwent ORIF humerus and patella on 8/12    A fib with RVR  Essential htn  Elevated troponin  Supratherapeutic INR  As outpt is on metop 50, warfarin  Amiodarone drip paused last night due to bradcyardia  Back on amiodarone drip now, was really tachy this am after tap  Appreciate cardiology following  Echo with global hypokinesis, EF around 40, mild MR, AI  Elevated trops suspected related to ckd  Holding warfarin due to hgb drop, trending hgb  Holding metoprolol due to hypotension  Digoxin stopped last hosp stay due to bradycardia/pauses    ESRD  Hypokalemia   AV graft thrombosis, 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 Says he has only had reflux issues lately since he has been sick. Breathing is OK. Says he does cough sometimes.    Physical Exam/Objective:  Gen: no acute distress, comfortable, alert, interactive, very pleasant but appears very ill, color slightly better than yesterday  ENT: no scleral icterus  Pulm: lungs are clear anteriorly,  breathing comfortably at rest  CV: irregular, tachy, moderate lower ext edema  GI: abdomen is soft, non-tender, non-distended with active bowel sounds.  MSK: left arm in splint, ACE, left knee incision appears uninfected  Derm: lots of bruising, pale, frail skin  Psych: appropriate affect    Medications:   Current Facility-Administered Medications   Medication Dose Route Frequency Provider Last Rate Last Admin    acetaminophen (TYLENOL) oral liquid 1,000 mg  1,000 mg Oral TID Buffy Morillo MD   1,000 mg at 08/30/24 0823    aspirin EC tablet 81 mg  81 mg Oral BID Jose Hamilton MD   81 mg at 08/30/24 0822    atorvastatin (LIPITOR) tablet 10 mg  10 mg Oral At Bedtime Jose Hamilton MD   10 mg at 08/29/24 2017    ceFEPIme (MAXIPIME) 2 g vial to attach to  mL bag for ADULTS or NS 50 mL bag for PEDS  2 g Intravenous Q24H Jose Hamilton MD   2 g at 08/29/24 1809    collagenase (SANTYL) ointment   Topical Daily Jose Hamilton MD   Given at 08/29/24 2015    fentaNYL (PF) (SUBLIMAZE) 100 MCG/2ML injection             hydrocortisone sodium succinate PF (solu-CORTEF) injection 50 mg  50 mg Intravenous Q6H Mariah Liz MD   50 mg at 08/30/24 1135    levothyroxine (SYNTHROID/LEVOTHROID) tablet 50 mcg  50 mcg Oral QAM AC Jose Hamilton MD   50 mcg at 08/30/24 0822    [Held by provider] methocarbamol (ROBAXIN) tablet 750 mg  750 mg Oral TID Jose Hamilton MD   750 mg at 08/28/24 1942    midodrine (PROAMATINE) tablet 10 mg  10 mg Oral TID w/meals Idania Crenshaw MD   10 mg at 08/30/24 1142    oxyBUTYnin (DITROPAN) tablet 5 mg  5 mg Oral QAM Jose Hamilton MD   5 mg at 08/30/24 0821    pantoprazole (PROTONIX) IV push injection 40 mg  40 mg Intravenous BID Hardeep Major DO   40 mg at 08/29/24 2018    polyethylene glycol (MIRALAX) Packet 17 g  17 g Oral Daily Jose Hamilton MD        sodium chloride (PF) 0.9% PF flush 3 mL  3 mL Intracatheter Q8H Hardeep Major, DO   3 mL at 08/29/24 2140    sodium chloride (PF) 0.9% PF  flush 3 mL  3 mL Intracatheter Q8H Jose Hamilton MD   3 mL at 08/30/24 1152    urea (GORMEL) 20 % cream CREA   Topical Daily Jose Hamilton MD   Given at 08/29/24 0931    vancomycin place maxwell - receiving intermittent dosing  1 each Intravenous See Admin Instructions Hardeep Major DO        warfarin ANTICOAGULANT (COUMADIN) tablet 2.5 mg  2.5 mg Oral ONCE at 18:00 Mariah Liz MD        Warfarin Dose Required Daily - Pharmacist Managed  1 each Oral See Admin Instructions Mariah Liz MD        wound support modular (EXPEDITE) bottle 60 mL  60 mL Oral Daily Orlando Villasenor, RD   60 mL at 08/30/24 1127     Clinically Significant Risk Factors        # Hypokalemia: Lowest K = 2.7 mmol/L in last 2 days, will replace as needed   # Hypocalcemia: Lowest iCa = 4.1 mg/dL in last 2 days, will monitor and replace as appropriate     # Hypoalbuminemia: Lowest albumin = 2.5 g/dL at 8/30/2024  5:19 AM, will monitor as appropriate    # Coagulation Defect: INR = 1.41 (Ref range: 0.85 - 1.15) and/or PTT = 30 Seconds (Ref range: 22 - 38 Seconds), will monitor for bleeding    # Hypertension: Noted on problem list  # Chronic heart failure with reduced ejection fraction: last echo with EF <40%           # Severe Malnutrition: based on nutrition assessment, PRESENT ON ADMISSION   # Financial/Environmental Concerns: none                  Hardeep Major DO   [facility]

## 2024-08-30 NOTE — PROGRESS NOTES
ICU PROGRESS NOTE:    Patient Summary:  Ijeoma Polk is a 74 year old gentleman with a past medical history significant for gastric bypass, short gut syndrome, chronic diarrhea, end-stage renal disease who was previously dialyzing via left upper extremity AV fistula now through a right tunneled subclavian catheter following thrombosis of graft after recent fall, hypertension, paroxysmal atrial fibrillation on anticoagulation, type 2 diabetes, presenting to the hospital for septic shock likely from infected pressure ulcers.    Lines/Drains/Tubes:  CVC Triple Lumen Right Femoral (Active)   Site Assessment WDL 08/30/24 1400   Dressing Chlorhexidine disk;Transparent 08/30/24 1400   Dressing Status clean;dry;intact 08/30/24 1400   Dressing Intervention dressing changed 08/29/24 1800   Line Necessity Yes, meets criteria 08/30/24 1400   Blue - Status infusing 08/30/24 1400   Brown - Status saline locked 08/30/24 1400   Brown - Intervention Flushed 08/30/24 0800   White - Status infusing 08/30/24 1400   White - Intervention Flushed 08/30/24 0800   Phlebitis Scale 0-->no symptoms 08/30/24 1400   Infiltration? no 08/30/24 1400   Number of days: 1       Hemodialysis Vascular Access Right Subclavian (Active)   Site Assessment WDL 08/30/24 1400   Dressing Intervention Dressing changed 08/29/24 1438   Dressing Status Clean, dry, intact 08/30/24 1400   Hand Off Report Yes 08/29/24 1730   Number of days:      Overnight events:  Had an acute drop in his hemoglobin and received 1 unit of PRBC.  Underwent CT CAP and demonstrated large BL pleural effusions R>L.  Escalating vasopressor requirements.  Amiodarone infusion held due to bradycardia.    Subjective:  Feels better today.    Objective:  Physical Exam:  Vent settings for last 24 hours:  Resp: 14    /58   Pulse 72   Temp 97.7  F (36.5  C) (Oral)   Resp 14   SpO2 97%     Intake/Output last 3 shifts:  I/O last 3 completed shifts:  In: 3054.79 [P.O.:840;  I.V.:1914.79]  Out: 2150 [Urine:300; Chest Tube:1850]  Intake/Output this shift:  I/O this shift:  In: 15.49 [I.V.:15.49]  Out: -     Physical Exam  Constitutional:       Appearance: Normal appearance.   HENT:      Head: Normocephalic.      Comments: Ecchymosis noted on face.     Nose: Nose normal.   Cardiovascular:      Heart sounds: Normal heart sounds.      Comments: In and out of A-Fib.  Pulmonary:      Comments: Diminished BS BL.  Abdominal:      General: Abdomen is flat.   Musculoskeletal:      Comments: Left arm in cast.  LLE in cast.   Skin:     Comments: Bed sores noted on thoracic spine and in sacrum.   Neurological:      Mental Status: He is alert.           LAB:  Recent Labs   Lab 08/30/24  1206 08/30/24  0519   WBC  --  8.9   HGB 8.6* 7.8*  7.8*   HCT  --  22.9*   PLT  --  234     Recent Labs   Lab 08/30/24  0519 08/29/24  1257 08/29/24  0606 08/28/24  1927 08/28/24  1231   * 135 131*   < > 133*  132*   CO2 25 25 26   < > 21*  21*   BUN 24.4* 38.9* 38.0*   < > 36.0*  34.7*   ALKPHOS 104 147  --   --  158*   ALT 14 11  --   --  21   AST 20 21  --   --   --     < > = values in this interval not displayed.       RADIOLOGY:  MICRO:  Date Source Organism   8/28 Blood NGTD      Organism Antibiotic Antibiotic Start Date Antibiotic End Date   NGTD Cefepime 8/28 Ongoing     Vancomycin 8/28 Ongoing         IMAGING:  CT CAP 8/29/24:  1.  Large right pleural effusion and moderate left pleural effusion with bilateral lower lobe, middle lobe, and lingular consolidation, which could represent compressive atelectasis or pneumonia.  2.  Extensive endobronchial opacities in the right lower lobe, left lower lobe, and middle lobe, which could be from aspiration, mucous plugging, or retained secretions.  3.  Increased fluid in the esophagus suggesting gastroesophageal reflux.  4.  Increased fluid in the colon, which is a nonspecific marker of diarrhea.    (Unchanged from previous visit)  XR Pelvis 8/29/24:  Right  femoral catheter terminates in the right hemipelvis. No acute fracture or dislocation. Mild degenerative narrowing of bilateral hip joints. Significant bilateral iliofemoral arterial atherosclerotic calcification.     CXR 8/28/24:  There is a tunneled dual lumen right IJ catheter with the tip over the right atrium. There are pleural effusions with atelectasis. Cannot assess for underlying airspace disease. No pneumothorax. The heart is not well assessed.      CT Head w/o Contrast 8/28/24:  1.  No acute intracranial process.  2.  Chronic small vessel ischemic disease and generalized cerebral atrophy.  3.  Encephalomalacia in the left frontal and left occipital lobes.        Assessment/Plan:  Ijeoma Polk is a 74 year old gentleman with a past medical history significant for gastric bypass, short gut syndrome, chronic diarrhea, end-stage renal disease who was previously dialyzing via left upper extremity AV fistula now through a right tunneled subclavian catheter following thrombosis of graft after recent fall, hypertension, paroxysmal atrial fibrillation on anticoagulation, type 2 diabetes, presenting to the hospital for septic shock likely from infected pressure ulcers.     NEURO: No acute issues presently.  Pain appears to be well-controlled.  Continue scheduled Tylenol for now.  As needed Oxycodone.  CVS:Has a known medical history of hypertension and paroxysmal atrial fibrillation.  He is on beta-blockers and systemic anticoagulation for these and presented yesterday with hypotension and lactic acidosis likely from septic shock.  He has previously been noted to do raise concerns for adrenal insufficiency and had a spot cortisol checked which was noted to be within normal limits.  He is typically on outpatient midodrine for low blood pressures.  Was noted to have runs of A-fib with RVR while in the emergency department and was administered metoprolol x 2 and switched over to an amiodarone infusion subsequently  on.  He was also noted to have nonsustained runs of ventricular tachycardia which were likely secondary to his ongoing hypokalemia and have presently resolved. Overnight on 8/29, developed bradycardia and amiodarone infusion was held.  Continue telemetry monitoring.  MAP goal of 65mmHg with SBP>90mmHg.  Vasopressors: Norepinephrine  Continue previously scheduled midodrine 3 times daily.  Afib  Being treated with Amiodarone.  Can resume coumadin today given INR is now subtherapeutic.  TTE today.  RESP: CT CAP with BL Pleural effusions, R>L.   Maintain SpO2 of >92%.  Placed right chest tube today and consider therapeutic thoracentesis on the left, tomorrow. Fluid consistent with an exudate.  GI:Prior history of small bowel obstruction status post small bowel resection and since then short gut syndrome and chronic diarrhea  Resume p.o. diet.  Continue Protonix daily for ulcer prophylaxis.  Have talked to nutrition and will do calorie counting.  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 Monday, Wednesday and Friday hemodialysis through a left upper extremity fistula.  During his recent hospitalization, he developed a thrombus in this and underwent a thrombectomy of his fistula.  He was subsequently initiated on dialysis through a right tunneled subclavian access.  He is noted to have multiple electrolyte abnormalities most notably hypokalemia and is due to be run against a higher potassium bath. Underwent a dialysis run on 8/29. Will touch base with nephrology about whether or not his AVF can be used so we are able to remove his tunneled access.  Would trend renal function daily and closely follow I/O.  Follow electrolytes and correct as abnormalities arise.  Did receive potassium replacement overnight.  ID:Presents with leukocytosis and septic shock likely secondary to infected wounds in his  back.  Obtain Blood cultures.  Antibiotics: Continue cefepime and vancomycin.  Have consulted orthopedics to assist in evaluation of his recent ORIF and make recommendations as needed.  HEMATOLOGIC:Noted to have acute onset of anemia and elevated INR. Received a total of 5m of Vitamin K and was administered 1 unit PRBC for Hb<7.  Daily CBC.  Resume coumadin.  ENDOCRINE:Known medical history of DM and presented with significant hypoglycemia despite ampules of D50 being administered.  He is also noted to be hypothyroid.  ICU insulin sliding scale.  Continue D10 infusion at 50 mL an hour.  Accu-Cheks 4 times daily and with meals.  Continue outpatient levothyroxine.  ICU PROPHYLAXIS:Protonix.  DISPO:Unclear. Continues to require ICU levels of care.         Total Critical Care Time : 1 Hour    Mariah Liz MD  Pulmonary and Critical Care  [facility]

## 2024-08-30 NOTE — PROCEDURES
CHEST TUBE PLACEMENT PROCEDURE NOTE  (NON-OR)    Procedure Date: 8/30/2024   Performing Physician: Mariah Liz MD    Pre-Procedure Diagnosis:   Large Right pleural effusion.  Post-Procedure Diagnosis: same as pre-procedure diagnosis  Procedure:  Chest Tube Insertion right Chest    Indications:   Pleural Effusion       Estimated Blood Loss: Minimal ml  Complications:  None      Procedure Details:    Informed consent, after discussion of the risks which include infection, bleeding, lung perforation, arrhythmia, pain, the need for additional procedures, the benefits, and alternatives to the procedure, was obtained and the consent form was signed by the patient.    A time out to verify the correct patient, procedure and site was performed.    Procedural sedation   The patient was placed in the appropriate position.  The skin was anesthetized using local infiltration with 1% lidocaine.  A small incision was made using a 9 blade.  Using a modified seldinger technique, a 20  -Fr. chest tube was inserted.  This was secured in place and reinforced with a dressing.  The chest tube was attached to the pleurovac.  A post-chest tube insertion CXR was obtained.      Condition: stable    Mariah Liz MD, 8/30/2024, 4:29 PM    Referring Physician: No ref. provider found  Attending Physician: Hardeep Major DO  Primary Care Physician: Outside, Provider

## 2024-08-30 NOTE — PROGRESS NOTES
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  Runs now at [facility] under Cares of Dr Helm  MWF 3.5 TT, K3 bath  Has CVC and arm access which is currently not being utilized due to arm fracture of left arm.      Ran yesterday, with lower BP low pull of fluids  Pt is unfortunately volume up  Had 1.7L removed with chest tube   Still hemodynamically fragile, review with hospital and ICU resident so will plan for HD run tomorrow as previously anticipated.   Plan to run with albumin.  Will also run Monday, he is normally MWF     Hypokalemia  Low recently since mid August, available historical labs normal-high  In dialysis clinic has been encouraged to increase dietary K   Is on K3 bath typically   K OK today     MBD-CKD - phos at goal, calcium with correction lower end of normal     HTN - metoprolol  Hypotensive,and now on pressors     DM2 - no noted diabetic medications. Hypoglycemia noted, ER/hospital following.       Afib / Vtach - metoprolol  S/p amiodarone infusion   Cardiology following     HLD- statin     Thyroid disease - levothyroxine   TSH very high, ? Taking medication as Rx PTA at TCU?     SUBJECTIVE:    Seen in room  Says he feels pretty weak   No acute concerns   Little urination   Says he is very tired.    HD nurse informs we could run today HD, hemodynamically fragile, will plan to run tomorrow as previously anticipated. (Saturday)   Will reesume HD on Monday to get back to his traditional MWF      OBJECTIVE:  Physical Exam   Temp: 97.5  F (36.4  C) Temp src: Axillary BP: 107/64 Pulse: 72   Resp: 14 SpO2: 100 % O2 Device: None (Room air)    There were no vitals filed for this visit.  Vital Signs with Ranges  Temp:  [97.5  F (36.4  C)-98.4  F (36.9  C)] 97.5  F (36.4  C)  Pulse:  [] 72  Resp:  [10-30] 14  BP: ()/(30-89) 107/64  SpO2:  [87 %-100 %] 100 %  I/O last 3 completed  shifts:  In: 3015.52 [P.O.:420; I.V.:1745.52; IV Piggyback:250]  Out: 300 [Urine:300]        No data found.[unfilled]    PHYSICAL EXAM:  General: in bed, awake, alert, orientd. Cheerful  HEENT:  Pupils equal, round, no scleral icterus  NECK:  no carotid bruits, no JVD  RESPIRATORY:  Lungs with crackle on ant auscultation, normal effort , room air   CARDIOVASCULAR:  RRR, no murmur  VOLUME: hard tense edema lower legs   ABDOMEN:  soft, BS present, non-tender, non-distended   GENITOURINARY:  No sanches   INTEGUMENTARY: Warm, dry, no rash  NEUROLOGIC: grossly intact   Psych: calm, cooperative  ACCESS: cvc    LABORATORY STUDIES:     Recent Labs   Lab 08/30/24  0519 08/30/24  0109 08/29/24  1818 08/29/24  1336 08/29/24  1257 08/29/24  0113 08/28/24  1231 08/27/24  0728   WBC 8.9  --   --  10.2 10.4 7.4 12.7* 10.7   RBC 2.37*  --   --  2.22* 2.13* 3.42* 2.89* 2.45*   HGB 7.8*  7.8* 8.1* 8.3* 7.2* 7.0* 11.2* 9.5* 8.0*   HCT 22.9*  --   --  21.5* 20.8* 32.7* 29.0* 24.9*     --   --  328 318 217 469* 333       Basic Metabolic Panel:  Recent Labs   Lab 08/30/24  0519 08/30/24  0109 08/29/24  2144 08/29/24  1647 08/29/24  1257 08/29/24  1157 08/29/24  1154 08/29/24  1027 08/29/24  0606 08/29/24  0113 08/28/24  2130 08/28/24  1927 08/28/24  1248 08/28/24  1231 08/27/24  0728   *  --   --   --  135  --   --   --  131*  --   --  132*  --  133*  132* 134*   POTASSIUM 3.4  --   --   --  3.4  --  3.2*  --  2.8*  2.8* 2.7*  --  2.7*  --  3.3*  3.6 2.5*   CHLORIDE 98  --   --   --  94*  --   --   --  93*  --   --  93*  --  90*  90* 92*   CO2 25  --   --   --  25  --   --   --  26  --   --  23  --  21*  21* 30*   BUN 24.4*  --   --   --  38.9*  --   --   --  38.0*  --   --  36.5*  --  36.0*  34.7* 28.9*   CR 2.77*  --   --   --  3.92*  --   --   --  3.96*  --   --  3.83*  --  3.99*  3.86* 3.30*   * 183* 157* 163* 121* 105*  --    < > 87  87 105*   < > 127*   < > 98  64* 59*   MELISSA 7.7*  --   --   --  6.7*  --    --   --  7.5*  --   --  7.4*  --  7.4*  7.9* 7.3*    < > = values in this interval not displayed.       INR  Recent Labs   Lab 08/30/24  0519 08/29/24  1336 08/29/24  0113 08/28/24  1812   INR 1.41* 2.19* 5.81* 5.98*        Recent Labs   Lab Test 08/30/24  0519 08/30/24  0109 08/29/24  1818 08/29/24  1336   INR 1.41*  --   --  2.19*   WBC 8.9  --   --  10.2   HGB 7.8*  7.8* 8.1*   < > 7.2*     --   --  328    < > = values in this interval not displayed.       Personally reviewed current labs       Vania De Paz PA-C  [facility]  [phone]
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
## 2024-08-30 NOTE — SIGNIFICANT EVENT
Significant Event Note - Remote Coverage MD     Time of event: 9:01 PM August 29, 2024    Description of event:  Paged: trouble swallowing the bigger pills though does well with the liquid forms.    Plan:  -Changed scheduled tylenol to liquid form     Discussed with: bedside nurse    Buffy Morillo MD

## 2024-08-30 NOTE — PROGRESS NOTES
"CLINICAL NUTRITION SERVICES - REASSESSMENT NOTE     Nutrition Prescription    RECOMMENDATIONS FOR MDs/PROVIDERS TO ORDER:  None    Malnutrition Status:    Severe malnutrition  In Context of:  Acute illness or injury  Chronic illness or disease    Recommendations already ordered by Registered Dietitian (RD):  - Started calorie counts ( - )  - Added yogurt BID     Future/Additional Recommendations:  Monitor po intake, calorie counts, ONS tolerance, labs, wts     EVALUATION OF THE PROGRESS TOWARD GOALS   Diet: Renal  Nutrition Supplements: [employer] 1.0 BID, Expedite once daily   Intake: Pt drank most of 1 KF 1.0 supplement, and had 25% of yogurt and cream of wheat.      NEW FINDINGS   Pt reports tolerating [employer] ONS, and did not have diarrhea afterward, which pt had been concerned would happen. Pt is forgetful, saying he doesn't remember if he had yogurt last night (he did), so calorie counts were ordered.     If pt consumes 2 KF 1.0 and 2 yogurts, he will be meeting ~50% of his kcal and protein needs. Hopefully pt's appetite will improve with therapies, and he'll feel like ordering more \"regular\" food, but for now his appetite is poor and he's struggling to consume even the few supplements we send him.    Labs:  Reviewed.  Na: 133 (L)  BUN: 24.4 (H) improving  Cr: 2.77 (H) improving  B-163 past 24 hours - pt is on steroids and D10 infusion    Meds:  Reviewed.  Synthroid  Protonix  Expedite    Previous Goals   - Total avg nutritional intake to meet a minimum of 25 kcal/kg and 1.5 g PRO/kg daily (per dosing wt 76.2 kg). - not met  - Maintain wt >76.2 - no new wt  - Electrolytes WNL - met    CURRENT NUTRITION DIAGNOSIS  Malnutrition related to poor appetite, altered GI function as evidenced by >5% wt loss in 1 month, <75% intake for >1 month, fat and muscle losses, moderate edema   Evaluation: no change    INTERVENTIONS  Implementation  - Started calorie counts ( - )  - Added yogurt BID "     Goals  - Total avg nutritional intake to meet a minimum of 25 kcal/kg and 1.5 g PRO/kg daily (per dosing wt 76.2 kg).  - Maintain wt >76.2  - Electrolytes WN    Monitoring/Evaluation  Progress toward goals will be monitored and evaluated per protocol.

## 2024-08-30 NOTE — PLAN OF CARE
Patient alert and oriented during shift. Able to titrate down norepinephrine drip to low dose (See MAR). Amiodarone turned off as patient was becoming bradycardic. Patient remained bradycardic dipping down to 39 at the lowest. Patient does not sustain heart rate less than 60 at any time and is otherwise vitally stable. Wound care completed. Femoral line infusing. Labs drawn and protocols addressed per orders. Patient turned as allowed. Did not lie supine during shift. On room air and afebrile.

## 2024-08-30 NOTE — PROVIDER NOTIFICATION
Paged cardiology regarding patients heart rate. Intermittently bradycardic to the 40s. Does not sustain in the 40s though dips in heart rate becoming more frequent. Currently sustaining heart rate 60-70s in afib with frequent PVCs. Per Dr. Samaniego hold the IV amio and if heart rate increases to turn the drip back on. See MAR

## 2024-08-30 NOTE — PROGRESS NOTES
"[facility]    ICU Progress Note       Date of Admission:  8/28/2024    Assessment: Critical Care   Ijeoma Polk is a 74 year old male admitted on 8/28/2024. He has a past medical history of ESRD (hypertensive nephrosclerosis, membranous nephropathy on biopsy) with dialysis MWF, hypertension, atrial fibrillation on warfarin, anemia of chronic disease, diabetes mellitus type 2 without use of insulin, recent mechanical fall with multiple fractures s/p ORIF in [place]. He presented to ED from dialysis in the setting of hypotension and found to be in septic shock with lactic acidosis. He has also had nonsustained vtach which may be due to electrolyte disturbances. Chronic pressure wounds are present and initially thought to be the source of septic shock, however patient was also found to have significant pleural effusions which could be the result of a pneumonia. He has been on empiric antibiotics since admission in the ED.      Plan: Critical Care   Neuro/ pain/ sedation:  - Monitor neurological status. Notify provider for any acute changes in exam  - Monitor pain control: PO acetaminophen 1g TID    Pulmonary:  CXR showed bilateral pleural effusions, CT Abdomen Pelvis on 8/29 showed \"Large right pleural effusion and moderate left pleural effusion with bilateral lower lobe, middle lobe, and lingular consolidation, which could represent compressive atelectasis or pneumonia, extensive endobronchial opacities in the right lower lobe, left lower lobe, and middle lobe, which could be from aspiration, mucous plugging, or retained secretions, increased fluid in the esophagus suggesting gastroesophageal reflux, increased fluid in the colon, which is a nonspecific marker of diarrhea.\"   - Placed right sided chest tube, see procedure note. Monitor output and seal.  - Supplemental oxygen to keep saturation about 92%  - Monitor fluid status to avoid overloading and worsening pleural " effusion    Cardiovascular:  Septic shock with hypotension on admission, has had episodes of nonsustained vtach and history of afib with RVR. Patient is usually on metoprolol and warfarin at home, which have been held in the setting of hypotension and supratherapeutic INR, respectively. Has had repeated episodes of hypotension and bradycardia on 8/29. Echocardiogram done on 8/29 showed EF 35-40% (previously 55-60%) and increased MR when compared to previous echo done on 6/20/2022.  - Monitor hemodynamic status  - Cardiology consulted and following  - Received IV metoprolol in ED, PRN IV metoprolol for rates >100   - IV norepinephrine infusion with MAP goal >65  - IV amiodarone 0.5 mg/min held in setting of bradycardia overnight on 8/29.  - Continue home PO midodrine 10 mg   - Stress dose steroids started, IV hydrocortisone 50 mg q6h    GI/Nutrition:  - Diet: Renal Diet (dialysis)  Snacks/Supplements Adult: [employer] Standard Oral Supplement; Between Meals  Snacks/Supplements Adult: Expedite Bottle; With Meals    - Nutrition consulted, calorie counting. Appreciate recommendations.   - History of gastric bypass and bowel resection, short gut syndrome, chronic diarrhea  - Consider imodium for diarrhea control, discontinue daily miralax if diarrhea continues  - IV Protonix 40 mg BID    Renal/ Fluid Balance:   - Will monitor intake and output  - Stopped D10 fluids, if hypotensive consider albumin to prevent worsening of fluid status  - ICU electrolyte replacement protocol  - Nephrology consulted for dialysis and ESRD management, s/p /24.     Endocrine:  - Type 2 DM without insulin use, chronic hypothyroidism  - Glucose checks before meals and at bedtime  - Continue home levothyroxine  - IV hydrocortisone for potential adrenal insufficiency associated with shock     ID:  - Presumed septic shock due to cellulitis associated with chronic pressure wounds (thoracic, sacral, R elbow) vs pneumonia  - Repeat blood culture  negative  - Empiric cefepime and vancomycin  - WOC consulted, appreciate recommendations and cares    Hematology:  - Supratherapetic INR (5.81 this morning), anemia of chronic disease (patient baseline hgb ~8).  - PO 2.5 mg Vit K once today  - Restart warfarin today, pharmacy to dose  - Hgb 7.8 on 8/30 s/p 1U pRBC, recheck hgb q6h today    MSK:   - Recent mechanical fall with multiple fractures  - Consult orthopedics for fracture management  - PT and OT consulted. Appreciate recommendations.       Lines/ tubes/ drains:  Dasilva Catheter: Not present  Lines: PRESENT      CVC Triple Lumen Right Femoral-Site Assessment: WDL  Hemodialysis Vascular Access Right Subclavian-Site Assessment: WDL        Prophylaxis:  - DVT Prophylaxis: VTE Prophylaxis contraindicated due to supratherapeutic INR, SCDs in place  - PUD Prophylaxis: Protonix 40 BID    Code Status: Full Code      Disposition:  - [facility], would like alternative placement upon discharge  - ICU, patient requiring vasopressors    The patient's care was discussed with the Attending Physician, Dr. Liz .    Rosalia Kelley MD  [facility]  Securely message with [employer] (more info)  Text page via [employer] Paging/Directory     Clinically Significant Risk Factors        # Hypokalemia: Lowest K = 2.7 mmol/L in last 2 days, will replace as needed   # Hypocalcemia: Lowest iCa = 4.1 mg/dL in last 2 days, will monitor and replace as appropriate    # Anion Gap Metabolic Acidosis: Highest Anion Gap = 22 mmol/L in last 2 days, will monitor and treat as appropriate  # Hypoalbuminemia: Lowest albumin = 2.5 g/dL at 8/30/2024  5:19 AM, will monitor as appropriate    # Coagulation Defect: INR = 1.41 (Ref range: 0.85 - 1.15) and/or PTT = 30 Seconds (Ref range: 22 - 38 Seconds), will monitor for bleeding    # Hypertension: Noted on problem list  # Chronic heart failure with reduced ejection fraction: last echo with EF <40%           # Severe  "Malnutrition: based on nutrition assessment, PRESENT ON ADMISSION                  __________________________________________________________________    Interval History   Patient was awake and pleasant this morning, stating he has been feeling much better since admitted. He slept well overnight and has been eating and drinking as able. Not having bowel movements, but states he is usually careful about what he eats as his bowel movements are usually very loose. He is aware of his hypotension and bradycardia episodes yesterday and stated that he \"worked with them\" to resolve the problem and did not have any symptoms associated with these events. He is not having any pain and BP doing well on minimal pressor support this morning. Amio held in setting of bradycardia yesterday and not restarted.     Physical Exam   Vital Signs: Temp: 97.5  F (36.4  C) Temp src: Axillary BP: 107/64 Pulse: 72   Resp: 14 SpO2: 100 % O2 Device: None (Room air)    Weight: 0 lbs 0 oz    Physical Exam  Constitutional:       General: He is awake. He is not in acute distress.  HENT:      Mouth/Throat:      Mouth: Mucous membranes are dry.      Comments: Edentulous   Cardiovascular:      Rate and Rhythm: Normal rate. Rhythm irregular.      Pulses: Normal pulses.      Heart sounds: No murmur heard.  Pulmonary:      Effort: Pulmonary effort is normal. No respiratory distress.      Breath sounds: Decreased breath sounds present.   Abdominal:      General: Abdomen is flat.      Palpations: Abdomen is soft.   Musculoskeletal:         General: No tenderness.      Right lower le+ Pitting Edema present.      Left lower le+ Pitting Edema present.      Comments: R femoral CVC in place, dressings C/D/I.  L arm and leg wrapped in bandage, C/D/I.   Neurological:      Mental Status: He is alert.         Data   I reviewed all medications, new labs and imaging results over the last 24 hours.    Arterial Blood Gases   No lab results found in last 7 " days.    Complete Blood Count   Recent Labs   Lab 24  0519 24  0109 24  1336 24  1257 24  0113   WBC 8.9  --   --  10.2 10.4 7.4   HGB 7.8*  7.8* 8.1* 8.3* 7.2* 7.0* 11.2*     --   --  328 318 217       Basic Metabolic Panel  Recent Labs   Lab 24  0846 24  0519 24  0109 24  2144 24  1647 24  1257 24  1157 24  1154 24  1027 24  0624  1927   NA  --  133*  --   --   --  135  --   --   --  131*  --  132*   POTASSIUM  --  3.4  --   --   --  3.4  --  3.2*  --  2.8*  2.8*   < > 2.7*   CHLORIDE  --  98  --   --   --  94*  --   --   --  93*  --  93*   CO2  --  25  --   --   --  25  --   --   --  26  --  23   BUN  --  24.4*  --   --   --  38.9*  --   --   --  38.0*  --  36.5*   CR  --  2.77*  --   --   --  3.92*  --   --   --  3.96*  --  3.83*   * 189* 183* 157*   < > 121*   < >  --    < > 87  87   < > 127*    < > = values in this interval not displayed.       Liver Function Tests  Recent Labs   Lab 24  05246 24  1257 24  0624  0113 24  1231   AST 20  --  21  --   --   --   --    ALT 14  --  11  --   --   --  21   ALKPHOS 104  --  147  --   --   --  158*   BILITOTAL 0.3  --  0.4  --   --   --  0.6   ALBUMIN 2.5*  --  3.0* 2.7*  --   --  2.8*   INR 1.41* 2.19*  --   --  5.81* 5.98* 4.58*       Pancreatic Enzymes  No lab results found in last 7 days.    Coagulation Profile  Recent Labs   Lab 24  0519 24  1336 24  0113 24  1812 24  1231   INR 1.41* 2.19* 5.81* 5.98* 4.58*   PTT  --   --   --   --  30       IMAGING:  Recent Results (from the past 24 hour(s))   Echocardiogram Complete   Result Value    LVEF  35-40%    [facility]    [id number]  [id number]  [id number]  [id number]^LOTTIE^[address] [address]     Name: CHUY RADER  MRN: [id number]  :  1950  Study Date: 08/29/2024 08:42 AM  Age: 74 yrs  Gender: Male  Patient Location: [facility]  Reason For Study: Stress Paroxysmal Ventricular Tachycardia  Ordering Physician: BREA TAFOYA  Performed By: RUDDY     BSA: 2.0 m2  Height: 72 in  Weight: 168 lb  HR: 108  BP: 101/72 mmHg  ______________________________________________________________________________  Procedure  Complete Portable Echo Adult. Definity (NDC #88393-163) given intravenously.  Lot [id number], Exp July 2025. Technically difficult study. Compared to the prior  study dated 6/20/2022 (report in Care Everywhere) the EF has decreased and the  MR has increased.  ______________________________________________________________________________  Interpretation Summary     The left ventricle is normal in size.  The visual ejection fraction is 35-40%.  There is moderate global hypokinesia of the left ventricle.  The right ventricle is moderately dilated.  Moderately decreased right ventricular systolic function  There is mild to moderate (1-2+) mitral regurgitation.  There is mild (1+) aortic regurgitation.  RVSP 29mmHg + RAP  Compared to the prior study dated 6/20/2022 (report in Care Everywhere) the EF  has decreased and the MR has increased  ______________________________________________________________________________  Left Ventricle  The left ventricle is normal in size. There is normal left ventricular wall  thickness. Left ventricular diastolic function is indeterminate. Diastolic  Doppler findings (E/E' ratio and/or other parameters) suggest left ventricular  filling pressures are normal. The visual ejection fraction is 35-40%. There is  moderate global hypokinesia of the left ventricle.     Right Ventricle  The right ventricle is moderately dilated. Moderately decreased right  ventricular systolic function.     Atria  The left atrium is severely dilated. The right atrium is moderately dilated.  There is no color Doppler evidence of an atrial shunt.      Mitral Valve  There is mild mitral annular calcification. The mitral valve leaflets are  mildly thickened. There is mild to moderate (1-2+) mitral regurgitation. There  is no mitral valve stenosis.     Tricuspid Valve  The tricuspid valve is not well visualized, but is grossly normal. There is  mild (1+) tricuspid regurgitation. RVSP 29mmHg + RAP.     Aortic Valve  There is mild trileaflet aortic sclerosis. There is mild (1+) aortic  regurgitation. No aortic stenosis is present.     Pulmonic Valve  The pulmonic valve is not well visualized. There is no pulmonic valvular  regurgitation. There is no pulmonic valvular stenosis.     Vessels  The aorta root cannot be assessed. The thoracic aorta cannot be assessed.  Inferior vena cava not well visualized for estimation of right atrial  pressure.     Pericardium  There is no pericardial effusion.     Rhythm  The rhythm was atrial fibrillation.  ______________________________________________________________________________  MMode/2D Measurements & Calculations     IVSd: 0.73 cm  LVIDd: 4.7 cm  LVIDs: 3.7 cm  LVPWd: 0.81 cm  FS: 22.5 %  LV mass(C)d: 117.3 grams  LV mass(C)dI: 59.3 grams/m2  LA dimension: 5.2 cm  LVOT diam: 2.4 cm  LVOT area: 4.5 cm2  EF Biplane: 45.9 %  LA Volume (BP): 130.0 ml  LA Volume Index (BP): 65.7 ml/m2     LA Volume Indexed (AL/bp): 72.1 ml/m2  RV Base: 5.6 cm  RWT: 0.34  TAPSE: 1.7 cm     Time Measurements  MM HR: 108.0 BPM     Doppler Measurements & Calculations  MV E max alexi: 97.3 cm/sec     MV dec time: 0.21 sec  Ao V2 max: 143.0 cm/sec  Ao max P.0 mmHg  Ao V2 mean: 95.9 cm/sec  Ao mean PG: 3.8 mmHg  Ao V2 VTI: 26.7 cm  SHALOM(I,D): 2.3 cm2  SHALOM(V,D): 2.4 cm2  AI P1/2t: 525.5 msec  LV V1 max P.4 mmHg  LV V1 max: 77.8 cm/sec  LV V1 VTI: 13.8 cm  MR PISA: 2.4 cm2  MR ERO: 0.15 cm2  MR volume: 24.4 ml  SV(LVOT): 62.0 ml  SI(LVOT): 31.4 ml/m2  PA acc time: 0.09 sec  TR max alexi: 278.3 cm/sec  TR max P.4 mmHg  AV Alexi Ratio (DI): 0.54  SHALOM  Index (cm2/m2): 1.2  E/E': 5.8  E/E' av.4  Lateral E/e': 5.8  Medial E/e': 9.0  Peak E' Alexi: 16.9 cm/sec  RV S Alexi: 11.9 cm/sec     ______________________________________________________________________________  Report approved by: Stella Pagan 2024 11:42 AM         CT Chest Abdomen Pelvis w/o Contrast    Narrative    EXAM: CT CHEST ABDOMEN PELVIS W/O CONTRAST  LOCATION: [facility]  DATE: 2024    INDICATION: possible sepsis without clear source  COMPARISON: 10/5/2022  TECHNIQUE: CT scan of the chest, abdomen, and pelvis was performed without IV contrast. Multiplanar reformats were obtained. Dose reduction techniques were used.   CONTRAST: None.    FINDINGS:   LUNGS AND PLEURA: Large right pleural effusion and moderate left pleural effusion with bilateral lower lobe consolidation, as well as consolidation in the middle lobe and lingula. Extensive endobronchial opacities throughout the right lower lobe airways   and, to a lesser extent, the left lower lobe and middle lobe. No pneumothorax.    MEDIASTINUM/AXILLAE: Right neck central venous catheter tip at the SVC-RA junction. Increased fluid in the esophagus. Right atrial enlargement.    CORONARY ARTERY CALCIFICATION: Severe.    HEPATOBILIARY: No significant mass or bile duct dilatation. Cholecystectomy.     PANCREAS: Normal.    SPLEEN: Normal.    ADRENAL GLANDS: Normal.    KIDNEYS/BLADDER: Normal.    BOWEL: No obstruction or inflammatory change. Increased fluid throughout the colon. Postsurgical changes in the small bowel and at the gastroesophageal junction.    LYMPH NODES: Normal.    VASCULATURE: Mild atherosclerotic calcification of the abdominal aorta with circumferential calcification of small and medium vessels in a pattern suggesting arteriosclerosis. Right groin vascular catheter tip in the right external iliac vein.    PELVIC ORGANS: Normal.    MUSCULOSKELETAL: Diffuse body wall edema. Osteopenia. Degenerative  changes in the spine with unchanged multilevel compression deformities. Partially healed sternal and right-sided rib fractures.      Impression    IMPRESSION:  1.  Large right pleural effusion and moderate left pleural effusion with bilateral lower lobe, middle lobe, and lingular consolidation, which could represent compressive atelectasis or pneumonia.  2.  Extensive endobronchial opacities in the right lower lobe, left lower lobe, and middle lobe, which could be from aspiration, mucous plugging, or retained secretions.  3.  Increased fluid in the esophagus suggesting gastroesophageal reflux.  4.  Increased fluid in the colon, which is a nonspecific marker of diarrhea.

## 2024-08-30 NOTE — PROGRESS NOTES
Cardiology Progress Note    Assessment:  Permanent atrial fibrillation with accelerated ventricular response due to sepsis, pain, pleural effusions   Sepsis syndrome  Wide QRS  complex tachycardia, short runs, probably nonsustained VT, aberrant conduction of A-fib less likely  Decreased LV systolic function likely due to systemic illness and tachycardia  End-stage renal disease on dialysis  Morbid obesity status post gastric bypass   Recent fall with multiple fractures  Diabetes mellitus  Troponin elevation, type II injury/decreased clearance  History of hypertension    Plan:  Rebolus with IV amiodarone and restart the drip  Switch to beta-blocker when hemodynamically more stable  He may need to undergo ischemic evaluation after he has substantial recovery from sepsis and its complications  Not appropriate candidate for invasive cardiac evaluation at this point.    Subjective:   Felt better this morning when I spoke to him.  Redeveloped rapid ventricular response when repositioned to undergo thoracentesis.    Objective:   BP (!) 78/51   Pulse (!) 151   Temp 97.5  F (36.4  C)   Resp 21   SpO2 (!) 83%     Intake/Output Summary (Last 24 hours) at 8/30/2024 1013  Last data filed at 8/30/2024 0811  Gross per 24 hour   Intake 3002.09 ml   Output 300 ml   Net 2702.09 ml         Physical Exam:  GENERAL: no distress  LUNGS: Decreased breath sounds  CARDIAC: irregular  rhythm, S1 & S2 normal.  No heaves, thrills, gallops or murmurs.  ABDOMEN: flat, negative hepatosplenomegaly, soft and non-tender.      Current Facility-Administered Medications Ordered in Epic   Medication Dose Route Frequency Provider Last Rate Last Admin     acetaminophen (TYLENOL) oral liquid 1,000 mg  1,000 mg Oral TID Buffy Morillo MD   1,000 mg at 08/30/24 0823     alteplase (CATHFLO ACTIVASE) injection 2 mg  2 mg Intracatheter Q1H PRN Vania De Paz PA-C         alteplase (CATHFLO ACTIVASE) injection 2 mg  2 mg Intracatheter Q1H PRN  Vania De Paz, SONYA         amiodarone (NEXTERONE) 1.8 mg/mL in dextrose 5% 200 mL ADULT STANDARD infusion  0.5 mg/min Intravenous Continuous Latanya Auguste MD 16.7 mL/hr at 08/30/24 0930 0.5 mg/min at 08/30/24 0930     artificial saliva (BIOTENE MT) solution 1 spray  1 spray Mouth/Throat 4x Daily PRN Jose Hamilton MD         aspirin EC tablet 81 mg  81 mg Oral BID Jose Hamilton MD   81 mg at 08/30/24 0822     atorvastatin (LIPITOR) tablet 10 mg  10 mg Oral At Bedtime Jose Hamilton MD   10 mg at 08/29/24 2017     benzocaine-menthol (CEPACOL) 15-3.6 MG lozenge 1 lozenge  1 lozenge Buccal Q1H PRN Jose Hamilton MD         calcium carbonate (TUMS) chewable tablet 1,000 mg  1,000 mg Oral 4x Daily PRN Joes Hamilton MD         ceFEPIme (MAXIPIME) 2 g vial to attach to  mL bag for ADULTS or NS 50 mL bag for PEDS  2 g Intravenous Q24H Jose Hamilton MD   2 g at 08/29/24 1809     collagenase (SANTYL) ointment   Topical Daily Jose Hamilton MD   Given at 08/29/24 2015     dextrose 10% infusion   Intravenous Continuous Hardeep Major DO 50 mL/hr at 08/29/24 2018 New Bag at 08/29/24 2018     fentaNYL (PF) (SUBLIMAZE) 100 MCG/2ML injection              fentaNYL (PF) (SUBLIMAZE) injection   Intravenous PRN Mariah Liz MD   12.5 mcg at 08/30/24 0951     hydrocortisone sodium succinate PF (solu-CORTEF) injection 50 mg  50 mg Intravenous Q6H Mariah Liz MD   50 mg at 08/30/24 0513     levothyroxine (SYNTHROID/LEVOTHROID) tablet 50 mcg  50 mcg Oral QAM AC Jose Hamilton MD   50 mcg at 08/30/24 0822     lidocaine (LMX4) cream   Topical Q1H PRN Jose Hamilton MD         lidocaine (LMX4) cream   Topical Q1H PRN Julianne Sahu MD         lidocaine 1 % 0.1-1 mL  0.1-1 mL Other Q1H PRN Jose Hamilton MD         lidocaine 1 % 0.1-5 mL  0.1-5 mL Other Q1H PRN Julianne Sahu MD         lidocaine-prilocaine (EMLA) cream   Topical Daily PRN Jose Hamilton MD         magnesium sulfate 2 g in 50 mL sterile water  intermittent infusion  2 g Intravenous Once Hardeep Major DO         melatonin tablet 1 mg  1 mg Oral At Bedtime PRN Jose Hamilton MD         [Held by provider] methocarbamol (ROBAXIN) tablet 750 mg  750 mg Oral TID Jose Hamilton MD   750 mg at 08/28/24 1942     metoprolol (LOPRESSOR) injection 5 mg  5 mg Intravenous Q5 Min PRN David Donis MD         midazolam (VERSED) 1 MG/ML injection              midodrine (PROAMATINE) tablet 10 mg  10 mg Oral TID w/meals Idania Crenshaw MD   10 mg at 08/30/24 0822     naloxone (NARCAN) injection 0.2 mg  0.2 mg Intravenous Q2 Min PRN Hardeep Major DO        Or     naloxone (NARCAN) injection 0.4 mg  0.4 mg Intravenous Q2 Min PRN Hardeep Major DO        Or     naloxone (NARCAN) injection 0.2 mg  0.2 mg Intramuscular Q2 Min PRN Hardeep Major DO        Or     naloxone (NARCAN) injection 0.4 mg  0.4 mg Intramuscular Q2 Min PRN Hardeep Major DO         norepinephrine (LEVOPHED) 16 mg in sodium chloride 0.9 % 250 mL infusion CENTRAL  0.01-0.6 mcg/kg/min Intravenous Continuous Idania Crenshaw MD 8.6 mL/hr at 08/30/24 1005 0.12 mcg/kg/min at 08/30/24 1005     ondansetron (ZOFRAN ODT) ODT tab 4 mg  4 mg Oral Q6H PRN Jose Hamilton MD        Or     ondansetron (ZOFRAN) injection 4 mg  4 mg Intravenous Q6H PRN Jose Hamilton MD         oxyBUTYnin (DITROPAN) tablet 5 mg  5 mg Oral QAM Jose Hamilton MD   5 mg at 08/30/24 0821     oxyCODONE (ROXICODONE) tablet 5 mg  5 mg Oral Q4H PRN Hardeep Major DO         pantoprazole (PROTONIX) IV push injection 40 mg  40 mg Intravenous BID Hardeep Major DO   40 mg at 08/29/24 2018     polyethylene glycol (MIRALAX) Packet 17 g  17 g Oral Daily Jose Hamilton MD         potassium chloride (KAYCIEL) solution 20 mEq  20 mEq Oral or Feeding Tube Once Hardeep Major DO         prochlorperazine (COMPAZINE) injection 5 mg  5 mg Intravenous Q6H PRN Jose Hamilton MD        Or     prochlorperazine (COMPAZINE) tablet 5 mg  5 mg Oral Q6H PRN  Jose Hamilton MD        Or     prochlorperazine (COMPAZINE) suppository 12.5 mg  12.5 mg Rectal Q12H PRN Jose Hamilton MD         senna-docusate (SENOKOT-S/PERICOLACE) 8.6-50 MG per tablet 1 tablet  1 tablet Oral BID PRN Jose Hamilton MD        Or     senna-docusate (SENOKOT-S/PERICOLACE) 8.6-50 MG per tablet 2 tablet  2 tablet Oral BID PRN Jose Hamilton MD         sodium chloride (PF) 0.9% PF flush 10 mL  10 mL Intracatheter Q15 Min PRN Vania De Paz PA-C         sodium chloride (PF) 0.9% PF flush 10 mL  10 mL Intracatheter Q15 Min PRN Vania De Paz PA-C         sodium chloride (PF) 0.9% PF flush 10-40 mL  10-40 mL Intracatheter Once PRN Julianne Sahu MD         sodium chloride (PF) 0.9% PF flush 3 mL  3 mL Intracatheter q1 min prn Hardeep Major DO         sodium chloride (PF) 0.9% PF flush 3 mL  3 mL Intracatheter Q8H Hardeep Major DO   3 mL at 08/29/24 2140     sodium chloride (PF) 0.9% PF flush 3 mL  3 mL Intracatheter Q8H Jose Hamilton MD   3 mL at 08/30/24 0110     sodium chloride (PF) 0.9% PF flush 3 mL  3 mL Intracatheter q1 min prn Jose Hamilton MD         sodium chloride 0.9% BOLUS 1-250 mL  1-250 mL Intravenous Q1H PRN Hardeep Major DO         sodium chloride 0.9% BOLUS 100-150 mL  100-150 mL Intravenous Q15 Min PRN Vania De Paz PA-C         urea (GORMEL) 20 % cream CREA   Topical Daily Jose Hamilton MD   Given at 08/29/24 0931     vancomycin place maxwell - receiving intermittent dosing  1 each Intravenous See Admin Instructions Hardeep Major DO         vasopressin (VASOSTRICT) 20 Units in sodium chloride 0.9 % 100 mL standard conc infusion  2.4 Units/hr Intravenous Continuous Hardeep Major DO         warfarin ANTICOAGULANT (COUMADIN) tablet 2.5 mg  2.5 mg Oral ONCE at 18:00 Mariah Liz MD         Warfarin Dose Required Daily - Pharmacist Managed  1 each Oral See Admin Instructions Mariah Liz MD         wound support modular (EXPEDITE) bottle 60 mL  60 mL Oral Daily  "Orlando Villasenor, RD   60 mL at 08/29/24 1144     No current Epic-ordered outpatient medications on file.       Cardiographics:    Telemetry: A-fib     Echo:  The left ventricle is normal in size.  The visual ejection fraction is 35-40%.  There is moderate global hypokinesia of the left ventricle.  The right ventricle is moderately dilated.  Moderately decreased right ventricular systolic function  There is mild to moderate (1-2+) mitral regurgitation.  There is mild (1+) aortic regurgitation.  RVSP 29mmHg + RAP  Compared to the prior study dated 6/20/2022 (report in Care Everywhere) the EF  has decreased and the MR has increased   Lab Results    Chemistry/lipid CBC Cardiac Enzymes/BNP/TSH/INR   No results for input(s): \"CHOL\", \"HDL\", \"LDL\", \"TRIG\", \"CHOLHDLRATIO\" in the last 00811 hours.  No results for input(s): \"LDL\" in the last 80401 hours.  Recent Labs   Lab Test 08/30/24  0846 08/30/24  0519   NA  --  133*   POTASSIUM  --  3.4   CHLORIDE  --  98   CO2  --  25   * 189*   BUN  --  24.4*   CR  --  2.77*   GFRESTIMATED  --  23*   MELISSA  --  7.7*     Recent Labs   Lab Test 08/30/24  0519 08/29/24  1257 08/29/24  0606   CR 2.77* 3.92* 3.96*     Recent Labs   Lab Test 08/01/22  1334   A1C 4.8          Recent Labs   Lab Test 08/30/24  0519   WBC 8.9   HGB 7.8*  7.8*   HCT 22.9*   MCV 97        Recent Labs   Lab Test 08/30/24  0519 08/30/24  0109 08/29/24  1818   HGB 7.8*  7.8* 8.1* 8.3*    No results for input(s): \"TROPONINI\" in the last 18008 hours.  Recent Labs   Lab Test 08/28/24  1231   NTBNPI 39,340*     Recent Labs   Lab Test 08/28/24  1231   TSH 16.20*     Recent Labs   Lab Test 08/30/24  0519 08/29/24  1336 08/29/24  0113   INR 1.41* 2.19* 5.81*                   "

## 2024-08-30 NOTE — PLAN OF CARE
Goal Outcome Evaluation:      Plan of Care Reviewed With: patient, child          Outcome Evaluation: Pt will discharge to TCU or to long term pending progress

## 2024-08-31 NOTE — PLAN OF CARE
Problem: Dysrhythmia  Goal: Normalized Cardiac Rhythm  Outcome: Not Progressing  Intervention: Monitor and Manage Cardiac Rhythm Effect    Problem: Risk for Delirium  Goal: Improved Sleep  Outcome: Progressing  Intervention: Promote Sleep     Problem: Sepsis/Septic Shock  Goal: Blood Glucose Level Within Targeted Range  Outcome: Progressing     Problem: Wound  Goal: Skin Health and Integrity  Outcome: Progressing  Intervention: Optimize Skin Protection    Goal: Optimal Wound Healing  Outcome: Progressing  Intervention: Promote Wound Healing        Goal Outcome Evaluation:       Assumed care at 1129-0282. Resumed amio per cardio- start PO metoprolol. Intensivist removed chest tube. Pt required 4L of o2 for approx 1 hr after & fluid weaped out of dressing- dressing reinforced x2. Pt had dialysis for 3.5 hrs and they were able to pull off 3L. Levo increased to 0.12 during dialysis & did have occasional runs of vtach but tolerated well overall. Able to titrate levo back down to 0.06 by end of shift. Kmag recheck tomorrow. Spine and sacral wound care performed, dressings changed. Q2 turns maintained. Son, Duane, stopped by at dinner time. Poor appetite- only consumed 50% of dinner. BG in 150s. No other acute changes on shift.

## 2024-08-31 NOTE — PHARMACY-VANCOMYCIN DOSING SERVICE
Pharmacy Vancomycin Note  Date of Service 2024  Patient's  1950   74 year old, male    Indication: Sepsis  Day of Therapy: 3  Current vancomycin regimen: intermittent dosing   Current vancomycin monitoring method: Renal Replacement Therapy  Current vancomycin therapeutic monitoring goal: 15-20 mg/L    Current estimated CrCl = Estimated Creatinine Clearance: 29.1 mL/min (A) (based on SCr of 2.77 mg/dL (H)).    Creatinine for last 3 days  2024:  7:27 PM Creatinine 3.83 mg/dL  2024:  6:06 AM Creatinine 3.96 mg/dL; 12:57 PM Creatinine 3.92 mg/dL  2024:  5:19 AM Creatinine 2.77 mg/dL    Recent Vancomycin Levels (past 3 days)  2024:  6:06 AM Vancomycin 8.1 ug/mL  2024:  5:41 AM Vancomycin 13.4 ug/mL    Vancomycin IV Administrations (past 72 hours)                     vancomycin (VANCOCIN) 1,500 mg in 0.9% NaCl 250 mL intermittent infusion (mg) 1,500 mg New Bag 24 1841    vancomycin (VANCOCIN) 1,500 mg in 0.9% NaCl 250 mL intermittent infusion (mg) 1,500 mg New Bag 24 1545                    Nephrotoxins and other renal medications (From now, onward)      Start     Dose/Rate Route Frequency Ordered Stop    24 1800  vancomycin (VANCOCIN) 1,500 mg in 0.9% NaCl 250 mL intermittent infusion         1,500 mg  166.7 mL/hr over 90 Minutes Intravenous ONCE 24 1513      24 1300  vasopressin (VASOSTRICT) 20 Units in sodium chloride 0.9 % 100 mL standard conc infusion         2.4 Units/hr  12 mL/hr  Intravenous CONTINUOUS 24 1257      24 0758  vancomycin place maxwell - receiving intermittent dosing         1 each Intravenous SEE ADMIN INSTRUCTIONS 24 0759      24 0300  norepinephrine (LEVOPHED) 16 mg in sodium chloride 0.9 % 250 mL infusion CENTRAL         0.01-0.6 mcg/kg/min × 76.2 kg  0.7-42.9 mL/hr  Intravenous CONTINUOUS 24 0240                 Contrast Orders - past 72 hours (72h ago, onward)      Start     Dose/Rate Route  Frequency Stop    08/29/24 1000  perflutren lipid microsphere (DEFINITY) injection SUSP 2 mL         2 mL Intravenous ONCE 08/29/24 0937            Interpretation of levels and current regimen:  Vancomycin level of 13.4 ug/mL represents a pre-HD level - estimated post-HD trough level <10.5ug/mL.     Has serum creatinine changed greater than 50% in last 72 hours: N/A - HD dependent    Urine output:  anuric    Renal Function: ESRD on Dialysis    Plan:  Continue intermittent dosing of vancomycin - will re-dose with 1500mg (~17mg/kg) IV vancomycin x1 dose after HD run today.   Vancomycin monitoring method: Renal Replacement Therapy  Vancomycin therapeutic monitoring goal: 15-20 mg/L  Pharmacy will check vancomycin levels as appropriate in 1-3 Days.  Serum creatinine levels will be ordered daily for the first week of therapy and at least twice weekly for subsequent weeks.    Fatimah Porras, PharmD, BCCCP

## 2024-08-31 NOTE — PROGRESS NOTES
[facility] Medicine PROGRESS NOTE      Identification/Summary:   Ijeoma Polk is a 74 year old male admitted on 8/28/2024. 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, atrial fibrillation on warfarin, anemia of chronic disease, diabetes mellitus type 2, recent fall and found with closed bicondylar fracture of distal end of left humerus and closed nondisplaced transverse fracture of left patella, who was recently at [facility] (after he sustained a mechanical fall and was found with rib fractures and sternal fracture, pulmonary nodules, rectal thickening with planned outpatient follow-up, and required OR for thrombectomy of left AV fistula, atrial fibrillation, documented history of drug-induced hemorrhagic disorder, who comes from outpatient  Nephrology clinic where he was noted to be hypotensive and somnolent. He did have half of his usual dialysis run on Monday.)     In the ER, he was hypotensive as well as tachycardic, met SIRS criteria with concern for sepsis with potential infection pressure wounds on limbs and in the back, given broad-spectrum antibiotics cefepime and vancomycin, and admitted.  Patient was also found with atrial fibrillation with RVR, given metoprolol pushes, eventually amiodarone drip. Needed vasopressors, central line.     CT performed due to unclear source of sepsis. Notable for large right pleural effusion, extensive endobronchial opacities, likely GERD with esophageal fluid. Had thoracentesis on morning of 8/30. Fluid exudative.     Assessment and Plan:  Possible septic shock  Acute on chronic anemia  Large rt pleural effusion, tapped on 8/30  Malnutrition  Pressure wounds-coccyx and T spine  On midodrine, pressors, solucortef  On cefepime, vanco  Appreciate critical care mgmt  Will consult SLP, lesa for aspiration  [place] for feeding, nutrition  consult    Recent fall with sternal fracture, rib fractures, left humerus, left patella fracture  Recent small volume subarachnoid hemorrhage  Schedule tylenol, oxycodone prn but not needing opiates  Holding robaxin  Denies pain unless being moved  Underwent ORIF humerus and patella on 8/12    A fib with RVR  Essential htn  Elevated troponin  Supratherapeutic INR  As outpt is on metop 50, warfarin  Amiodarone drip during the day  Back on low dose metoprolol orally  Appreciate cardiology mgmt  Echo with global hypokinesis, EF around 40, mild MR, AI  Elevated trops suspected related to ckd  Resuming warfarin   Digoxin stopped last hosp stay due to bradycardia/pauses    ESRD  Hypokalemia   AV graft thrombosis, 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  Dialysis typically on MWF  Appreciate renal mgmt    Rt foot pain  Wonder about poor perfusion  Check us arterial rt leg    Hx of gastric bypass  Hx of partial SBO  Hx of short gut syndrome    Diet: Renal Diet (dialysis)  Snacks/Supplements Adult: Nat Metastorm Standard Oral Supplement; Between Meals  Snacks/Supplements Adult: Expedite Bottle; With Meals  Calorie Counts  Fluids: none  Pain meds:tylenol tid, robaxin prn  Therapy:none currently  DVT Prophylaxis:  elevated INR  Code Status: Full Code  Medically Ready for Discharge: Anticipated in 2-4 Days        Interval History/Subjective:  Feeling OK. Breathing is fine. No n/v/d. Has some pain in the rt foot.     Physical Exam/Objective:  Gen: no acute distress, comfortable, alert, interactive, very pleasant but appears very ill  ENT: no scleral icterus  Pulm: lungs are clear anteriorly, breathing comfortably at rest  CV: irregular, tachy, moderate to severe bilateral ower ext edema, difficult to palpate or doppler pedal pulses on the rt foot  Derm: lots of bruising, pale, frail skin  Psych: appropriate affect    Medications:   Current Facility-Administered Medications   Medication Dose Route Frequency Provider Last Rate Last Admin    acetaminophen  (TYLENOL) oral liquid 1,000 mg  1,000 mg Oral TID Buffy Morillo MD   1,000 mg at 08/31/24 0815    aspirin EC tablet 81 mg  81 mg Oral BID Jose Hamilton MD   81 mg at 08/31/24 0818    atorvastatin (LIPITOR) tablet 10 mg  10 mg Oral At Bedtime Jose Hamilton MD   10 mg at 08/30/24 2233    ceFEPIme (MAXIPIME) 1 g vial to attach to  mL bag for ADULTS or NS 50 mL bag for PEDS  1 g Intravenous Q24H Hardeep Major DO        collagenase (SANTYL) ointment   Topical Daily Jose Hamilton MD   Given at 08/30/24 1443    hydrocortisone sodium succinate PF (solu-CORTEF) injection 50 mg  50 mg Intravenous Q6H Mariah Liz MD   50 mg at 08/31/24 1333    levothyroxine (SYNTHROID/LEVOTHROID) tablet 50 mcg  50 mcg Oral QAM AC Jose Hamilton MD   50 mcg at 08/31/24 0818    [Held by provider] methocarbamol (ROBAXIN) tablet 750 mg  750 mg Oral TID Jose Hamilton MD   750 mg at 08/28/24 1942    metoprolol tartrate (LOPRESSOR) tablet 25 mg  25 mg Oral or Feeding Tube BID David Donis MD   25 mg at 08/31/24 1318    midodrine (PROAMATINE) tablet 10 mg  10 mg Oral TID w/meals Idania Crenshaw MD   10 mg at 08/31/24 1318    oxyBUTYnin (DITROPAN) tablet 5 mg  5 mg Oral QAM Jose Hamilton MD   5 mg at 08/31/24 0817    pantoprazole (PROTONIX) EC tablet 40 mg  40 mg Oral BID AC Hardeep Major DO        polyethylene glycol (MIRALAX) Packet 17 g  17 g Oral Daily Jose Hamilton MD   17 g at 08/31/24 0819    sodium chloride (PF) 0.9% PF flush 3 mL  3 mL Intracatheter Q8H Hardeep Major DO   3 mL at 08/31/24 0531    sodium chloride (PF) 0.9% PF flush 3 mL  3 mL Intracatheter Q8H Jose Hamilton MD   3 mL at 08/31/24 0157    sodium chloride (PF) 0.9% PF flush 9 mL  9 mL Intracatheter During Dialysis/CRRT (from stock) Vania De Paz PA-C        sodium chloride (PF) 0.9% PF flush 9 mL  9 mL Intracatheter During Dialysis/CRRT (from stock) Vania De Paz PA-C        urea (GORMEL) 20 % cream CREA   Topical Daily Jose Hamilton MD    Given at 08/30/24 1444    vancomycin place maxwell - receiving intermittent dosing  1 each Intravenous See Admin Instructions Hardeep Major DO        warfarin ANTICOAGULANT (COUMADIN) tablet 2.5 mg  2.5 mg Oral ONCE at 18:00 Hardeep Major DO        Warfarin Dose Required Daily - Pharmacist Managed  1 each Oral See Admin Instructions Mariah Liz MD        wound support modular (EXPEDITE) bottle 60 mL  60 mL Oral Daily Orlando Villasenor, RD   60 mL at 08/31/24 1318     Clinically Significant Risk Factors              # Hypoalbuminemia: Lowest albumin = 2.5 g/dL at 8/30/2024  5:19 AM, will monitor as appropriate       # Hypertension: Noted on problem list  # Chronic heart failure with reduced ejection fraction: last echo with EF <40%          # Overweight: Estimated body mass index is 26.31 kg/m  as calculated from the following:    Height as of 8/27/24: 1.829 m (6').    Weight as of this encounter: 88 kg (194 lb 0.1 oz)., PRESENT ON ADMISSION  # Severe Malnutrition: based on nutrition assessment, PRESENT ON ADMISSION   # Financial/Environmental Concerns: none                  Hardeep Major DO   [facility]

## 2024-08-31 NOTE — PROGRESS NOTES
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  Runs now at [facility] under Cares of Dr Helm  MWF 3.5 TT, K3 bath  Has CVC and arm access which is currently not being utilized due to arm fracture of left arm.  S/P Thrombectomy 8/12/24 at [facility]    If arm OK from ortho,no concerns to utilize Left AVF access from renal. If no problems with run with use, OK to remove CVC.      Plans for HD run today, quite hypervolemic, will attempt to pull fluids with albumin support as best able to prevent hypotension     Hypokalemia  Low recently since mid August, available historical labs normal-high  In dialysis clinic has been encouraged to increase dietary K   Is on K3 bath typically   K OK today    Hypervolemia  Poor HD runs outpt due to hypotension  Ran on Thursday but little volume removal due to BP  Chest tube in place w 1800mL net out per nurse report  Albumin with HD to attempt to maintain hemodynamics to be able to UF  Tense fluid filled legs, little urine outpt this admit     MBD-CKD - phos at goal, calcium with correction lower end of normal     HTN - metoprolol  Hypotensive,and now on pressors now        DM2 - no noted diabetic medications. Hypoglycemia noted, ER/hospital following.       Afib / Vtach - metoprolol  S/p amiodarone infusion   Cardiology following     HLD- statin     Thyroid disease - levothyroxine   TSH very high, ? Taking medication as Rx PTA     SUBJECTIVE:    Seen in room, responds \"I'm fine\" to most questions asked   Breathing ok  Appetite he states is low, nurses say hasn't eaten much  Low urine output  Will plan to run dialysis, he is \"fine\" with that    Reviewed case with intensivist, ask about fistula.   S/p thrombetomy, at OP dialysis holding use due to concern of fracture. If OK from ortho, " no obections from me to use.        OBJECTIVE:  Physical Exam   Temp: 97.3  F (36.3  C) Temp src: Oral BP: 107/66 Pulse: 77   Resp: 12 SpO2: 100 % O2 Device: None (Room air)    Vitals:     Vital Signs with Ranges  Temp:  [97.3  F (36.3  C)-98.6  F (37  C)] 97.3  F (36.3  C)  Pulse:  [] 77  Resp:  [0-32] 12  BP: ()/(47-89) 107/66  SpO2:  [79 %-100 %] 100 %  I/O last 3 completed shifts:  In: 2210.82 [P.O.:860; I.V.:1350.82]  Out: 1870 [Chest Tube:1870]        No data found.[unfilled]    PHYSICAL EXAM:  General: sitting up in bed, awake, alert, oriented.  HEENT:  Pupils equal, round, no scleral icterus  NECK:  no carotid bruits, no JVD  RESPIRATORY:  Lungs without crackles on anterior auscultation   CARDIOVASCULAR:  RRR, no murmur  VOLUME: hard tense edema lower legs   ABDOMEN:  soft, BS present, non-tender, non-distended   GENITOURINARY:  No sanches, not urinating   INTEGUMENTARY: Warm, dry, no rash  NEUROLOGIC: grossly intact   Psych: calm, cooperative  ACCESS: cvc    LABORATORY STUDIES:     Recent Labs   Lab 08/30/24  1206 08/30/24  0519 08/30/24  0109 08/29/24  1818 08/29/24  1336 08/29/24  1257 08/29/24  0113 08/28/24  1231 08/27/24  0728   WBC  --  8.9  --   --  10.2 10.4 7.4 12.7* 10.7   RBC  --  2.37*  --   --  2.22* 2.13* 3.42* 2.89* 2.45*   HGB 8.6* 7.8*  7.8* 8.1* 8.3* 7.2* 7.0* 11.2* 9.5* 8.0*   HCT  --  22.9*  --   --  21.5* 20.8* 32.7* 29.0* 24.9*   PLT  --  234  --   --  328 318 217 469* 333       Basic Metabolic Panel:  Recent Labs   Lab 08/31/24  0541 08/30/24  1834 08/30/24  1646 08/30/24  1640 08/30/24  1206 08/30/24  1200 08/30/24  0846 08/30/24  0519 08/30/24  0109 08/29/24  1647 08/29/24  1257 08/29/24  1157 08/29/24  1154 08/29/24  1027 08/29/24  0606 08/28/24  2130 08/28/24  1927 08/28/24  1248 08/28/24  1231 08/27/24  0728   NA  --   --   --   --   --   --   --  133*  --   --  135  --   --   --  131*  --  132*  --  133*  132* 134*   POTASSIUM 4.0  --   --  3.8 3.5  --   --  3.4  --    --  3.4  --  3.2*  --  2.8*  2.8*   < > 2.7*  --  3.3*  3.6 2.5*   CHLORIDE  --   --   --   --   --   --   --  98  --   --  94*  --   --   --  93*  --  93*  --  90*  90* 92*   CO2  --   --   --   --   --   --   --  25  --   --  25  --   --   --  26  --  23  --  21*  21* 30*   BUN  --   --   --   --   --   --   --  24.4*  --   --  38.9*  --   --   --  38.0*  --  36.5*  --  36.0*  34.7* 28.9*   CR  --   --   --   --   --   --   --  2.77*  --   --  3.92*  --   --   --  3.96*  --  3.83*  --  3.99*  3.86* 3.30*   GLC  --  170* 205*  --   --  133* 154* 189* 183*   < > 121*   < >  --    < > 87  87   < > 127*   < > 98  64* 59*   MELISSA  --   --   --   --   --   --   --  7.7*  --   --  6.7*  --   --   --  7.5*  --  7.4*  --  7.4*  7.9* 7.3*    < > = values in this interval not displayed.       INR  Recent Labs   Lab 08/31/24 0541 08/30/24  1835 08/30/24  0519 08/29/24  1336   INR 1.29* 1.28* 1.41* 2.19*        Recent Labs   Lab Test 08/31/24  0541 08/30/24  1835 08/30/24  1206 08/30/24 0519 08/29/24  1818 08/29/24  1336   INR 1.29* 1.28*  --  1.41*  --  2.19*   WBC  --   --   --  8.9  --  10.2   HGB  --   --  8.6* 7.8*  7.8*   < > 7.2*   PLT  --   --   --  234  --  328    < > = values in this interval not displayed.       Personally reviewed current labs       Vania De Paz PA-C  [facility]  [phone]

## 2024-08-31 NOTE — PLAN OF CARE
Problem: Dysrhythmia  Goal: Normalized Cardiac Rhythm  Outcome: Not Progressing  Intervention: Monitor and Manage Cardiac Rhythm Effect    Problem: Sepsis/Septic Shock  Goal: Blood Glucose Level Within Targeted Range  Outcome: Progressing  Goal: Absence of Infection Signs and Symptoms  Outcome: Progressing  Intervention: Initiate Sepsis Management    Intervention: Promote Recovery    Problem: Wound  Goal: Optimal Wound Healing  Outcome: Progressing      Goal Outcome Evaluation:       Assumed care at 0700. Pt was no longer on amio drip since 2200 on 8/29 & on 0.02 mcg/min. Per intesivist, pt required R sd chest tube placement. During chest tube placement pt went into afib RVR & became hypotensive. Amio restarted & levo adjusted to reach map goal of 65. Tolerated chest tube placement well.1600ml out upon placement. 1810ml total on shift. Dextrose d/cd per provider. Spoke with son Duane & gave pt updates. No urine output on shift. Wound care completed on spine. Poor appetite. Denies pain, reports occasional discomfort. K & mag replaced- Recheck tomorrow. Paged intesivist regarding pt coughing up blood-tinged sputum- sputum culture & INR labs sent. Additionally paged regarding insulin coverage. Relayed to tele ICU-- to call back with further orders. BG elevated at 170-205. Q2 turns. No other acute changes on shift.

## 2024-08-31 NOTE — PLAN OF CARE
Pt remains alert and oriented, able to make needs known. Maintaining adequate oxygenation on room air. Continues to have A-Fib, mostly rate controlled with frequent ectopy toward the end of the overnight. Amio gtt paused due to increasing periods of bradicardia. Maintained adequate blood pressures on levo at 0.2, gtt gradually titrated to maintain MAPs at/above goal. Denies pain, turning Q2 from right to left. 10mL total out from chest tube overnight. Continuing to monitor and manage per plan of care.    Goal Outcome Evaluation:         Problem: Adult Inpatient Plan of Care  Goal: Absence of Hospital-Acquired Illness or Injury  Outcome: Progressing  Intervention: Identify and Manage Fall Risk  Recent Flowsheet Documentation  Taken 8/31/2024 0400 by Dana Trevino RN  Safety Promotion/Fall Prevention:   safety round/check completed   activity supervised  Taken 8/31/2024 0200 by Dana Trevino RN  Safety Promotion/Fall Prevention:   safety round/check completed   activity supervised  Taken 8/31/2024 0000 by Dana Trevino RN  Safety Promotion/Fall Prevention:   safety round/check completed   activity supervised  Intervention: Prevent Skin Injury  Recent Flowsheet Documentation  Taken 8/31/2024 0000 by Dana Trevino RN  Body Position: weight shifting  Taken 8/30/2024 2200 by Dana Trevino RN  Body Position: weight shifting  Taken 8/30/2024 2000 by Dana Trevino RN  Body Position: weight shifting  Intervention: Prevent and Manage VTE (Venous Thromboembolism) Risk  Recent Flowsheet Documentation  Taken 8/31/2024 0400 by Dana Trevino RN  VTE Prevention/Management: SCDs off (sequential compression devices)  Taken 8/31/2024 0200 by Dana Trevino RN  VTE Prevention/Management: SCDs off (sequential compression devices)  Taken 8/31/2024 0000 by Dana Trevino, [place]  VTE Prevention/Management: SCDs off (sequential compression devices)  Taken 8/30/2024 2200 by Dana Trevino RN  VTE  Prevention/Management: SCDs off (sequential compression devices)  Taken 8/30/2024 2000 by Dana Trevino RN  VTE Prevention/Management: SCDs off (sequential compression devices)  Intervention: Prevent Infection  Recent Flowsheet Documentation  Taken 8/31/2024 0400 by Dana Trevino RN  Infection Prevention: equipment surfaces disinfected  Taken 8/31/2024 0200 by Dana Trevino RN  Infection Prevention: equipment surfaces disinfected  Taken 8/31/2024 0000 by Dana Trevino RN  Infection Prevention:   equipment surfaces disinfected   rest/sleep promoted     Problem: Risk for Delirium  Goal: Optimal Coping  Outcome: Progressing  Goal: Improved Sleep  Outcome: Progressing  Intervention: Promote Sleep  Recent Flowsheet Documentation  Taken 8/31/2024 0400 by Dana Trevino RN  Sleep/Rest Enhancement:   noise level reduced   room darkened  Taken 8/31/2024 0200 by Dana Trevino RN  Sleep/Rest Enhancement:   noise level reduced   room darkened  Taken 8/31/2024 0000 by Dana Trevino RN  Sleep/Rest Enhancement:   noise level reduced   room darkened  Taken 8/30/2024 2200 by Dana Trevino RN  Sleep/Rest Enhancement:   noise level reduced   room darkened  Taken 8/30/2024 2000 by Dana Trevino RN  Sleep/Rest Enhancement:   noise level reduced   room darkened     Problem: Wound  Goal: Optimal Wound Healing  Outcome: Progressing  Intervention: Promote Wound Healing  Recent Flowsheet Documentation  Taken 8/31/2024 0400 by Dana Trevino RN  Sleep/Rest Enhancement:   noise level reduced   room darkened  Taken 8/31/2024 0200 by Dana Trevino RN  Sleep/Rest Enhancement:   noise level reduced   room darkened  Taken 8/31/2024 0000 by Dana Trevino RN  Sleep/Rest Enhancement:   noise level reduced   room darkened  Taken 8/30/2024 2200 by Dana Trevino RN  Sleep/Rest Enhancement:   noise level reduced   room darkened  Taken 8/30/2024 2000 by Dana Trevino RN  Sleep/Rest Enhancement:   noise  level reduced   room darkened

## 2024-08-31 NOTE — PROGRESS NOTES
ICU PROGRESS NOTE:    Patient Summary:  Ijeoma Polk is a 74 year old gentleman with a past medical history significant for gastric bypass, short gut syndrome, chronic diarrhea, end-stage renal disease who was previously dialyzing via left upper extremity AV fistula now through a right tunneled subclavian catheter following thrombosis of graft after recent fall, hypertension, paroxysmal atrial fibrillation on anticoagulation, type 2 diabetes, presenting to the hospital for septic shock of unclear etiology.    Lines/Drains/Tubes:  CVC Triple Lumen Right Femoral (Active)   Site Assessment [facility] 08/31/24 0600   Dressing Chlorhexidine disk;Transparent;Securement device 08/31/24 0600   Dressing Status dry;intact;old drainage 08/31/24 0600   Dressing Intervention dressing changed 08/29/24 1800   Line Necessity Yes, meets criteria 08/31/24 0600   Blue - Status saline locked 08/31/24 0600   Blue - Intervention Flushed;Cap change 08/31/24 0400   Brown - Status saline locked 08/31/24 0600   Brown - Intervention Flushed 08/31/24 0200   White - Status infusing 08/31/24 0600   White - Intervention Flushed 08/30/24 0800   Phlebitis Scale 0-->no symptoms 08/31/24 0600   Infiltration? no 08/31/24 0600   Number of days: 2       Hemodialysis Vascular Access Right Subclavian (Active)   Site Assessment [facility] 08/31/24 0600   Dressing Intervention Dressing changed 08/29/24 1438   Dressing Status Clean, dry, intact 08/31/24 0600   Hand Off Report Yes 08/29/24 1730   Number of days:      Overnight events:  Amiodarone infusion held again due to bradycardia.    Subjective:  States that he is feeling better.    Objective:  Physical Exam:  Vent settings for last 24 hours:  Resp: 12    /56   Pulse 77   Temp 97.9  F (36.6  C) (Oral)   Resp 12   SpO2 100%     Intake/Output last 3 shifts:  I/O last 3 completed shifts:  In: 1905.58 [P.O.:860; I.V.:1045.58]  Out: 1870 [Chest Tube:1870]  Intake/Output this shift:  No intake/output data  recorded.    Physical Exam  Constitutional:       Appearance: Normal appearance.   HENT:      Head: Normocephalic.      Comments: Ecchymosis noted on face.     Nose: Nose normal.   Cardiovascular:      Heart sounds: Normal heart sounds.      Comments: In and out of A-Fib.  Pulmonary:      Comments: Diminished BS BL.  Abdominal:      General: Abdomen is flat.   Musculoskeletal:      Comments: Left arm in cast.  LLE in cast.   Skin:     Comments: Bed sores noted on thoracic spine and in sacrum.   Neurological:      Mental Status: He is alert.           LAB:   Latest Reference Range & Units 08/30/24 11:51   Cell Count Fluid Source  Pleural Cavity, Right   Total Nucleated Cells /uL 4,817   % Neutrophils Fluid % 88   % Lymphocytes Fluid % 5   % Mono/Macro Fluid % 1   % Lining Cells % 4   % Other Cells Fluid % 2   Color Fluid Colorless, Yellow  Red !   Appearance Fluid Clear  Slightly Bloody !   Glucose Fluid Source  Pleural Cavity, Right   Glucose Fluid mg/dL 182   LD Fluid Source  Pleural Cavity, Right   Lactate Dehydrogenase Fluid U/L 811   pH Fluid Source  Pleural Cavity, Right   Ph Fluid pH 8.0   Protein Fluid Source  Pleural Cavity, Right   Protein Total Fluid g/dL 1.8   !: Data is abnormal  Recent Labs   Lab 08/30/24  1206 08/30/24  0519   WBC  --  8.9   HGB 8.6* 7.8*  7.8*   HCT  --  22.9*   PLT  --  234     Recent Labs   Lab 08/30/24  0519 08/29/24  1257 08/29/24  0606 08/28/24  1927 08/28/24  1231   * 135 131*   < > 133*  132*   CO2 25 25 26   < > 21*  21*   BUN 24.4* 38.9* 38.0*   < > 36.0*  34.7*   ALKPHOS 104 147  --   --  158*   ALT 14 11  --   --  21   AST 20 21  --   --   --     < > = values in this interval not displayed.       RADIOLOGY:  MICRO:  Date Source Organism   8/28 Blood NGTD      Organism Antibiotic Antibiotic Start Date Antibiotic End Date   NGTD Cefepime 8/28 Ongoing     Vancomycin 8/28 Ongoing         IMAGING:  CT CAP 8/29/24:  1.  Large right pleural effusion and moderate left  pleural effusion with bilateral lower lobe, middle lobe, and lingular consolidation, which could represent compressive atelectasis or pneumonia.  2.  Extensive endobronchial opacities in the right lower lobe, left lower lobe, and middle lobe, which could be from aspiration, mucous plugging, or retained secretions.  3.  Increased fluid in the esophagus suggesting gastroesophageal reflux.  4.  Increased fluid in the colon, which is a nonspecific marker of diarrhea.    (Unchanged from previous visit)  XR Pelvis 8/29/24:  Right femoral catheter terminates in the right hemipelvis. No acute fracture or dislocation. Mild degenerative narrowing of bilateral hip joints. Significant bilateral iliofemoral arterial atherosclerotic calcification.     CXR 8/28/24:  There is a tunneled dual lumen right IJ catheter with the tip over the right atrium. There are pleural effusions with atelectasis. Cannot assess for underlying airspace disease. No pneumothorax. The heart is not well assessed.      CT Head w/o Contrast 8/28/24:  1.  No acute intracranial process.  2.  Chronic small vessel ischemic disease and generalized cerebral atrophy.  3.  Encephalomalacia in the left frontal and left occipital lobes.        Assessment/Plan:  Ijeoma Polk is a 74 year old gentleman with a past medical history significant for gastric bypass, short gut syndrome, chronic diarrhea, end-stage renal disease who was previously dialyzing via left upper extremity AV fistula now through a right tunneled subclavian catheter following thrombosis of graft after recent fall, hypertension, paroxysmal atrial fibrillation on anticoagulation, type 2 diabetes, presenting to the hospital for septic shock likely from infected pressure ulcers.     NEURO: No acute issues presently.  Pain appears to be well-controlled.  Continue scheduled Tylenol for now.  As needed Oxycodone.  CVS:Has a known medical history of hypertension and paroxysmal atrial fibrillation.  He is on  beta-blockers and systemic anticoagulation for these and presented yesterday with hypotension and lactic acidosis likely from septic shock.  He has previously been noted to raise concerns for adrenal insufficiency and had a spot cortisol checked which was noted to be within normal limits.  He is typically on outpatient midodrine for low blood pressures.  Was noted to have runs of A-fib with RVR while in the emergency department and was administered metoprolol x 2 and switched over to an amiodarone infusion subsequently on.  He was also noted to have nonsustained runs of ventricular tachycardia which were likely secondary to his ongoing hypokalemia and have presently resolved. Amiodarone was resumed on 8/30 due to A-fib with rates >150 and concomitant drop in blood pressures. Overnight on 8/30, amiodarone infusion discontinued again due to bradycardia.  Continue telemetry monitoring.  MAP goal of 65mmHg with SBP>90mmHg.  Vasopressors: Norepinephrine  Continue previously scheduled midodrine 3 times daily.  Stress dose steroids 50 mg Q6h.  Afib  Being treated with Amiodarone which is intermittently being held. Will talk to cardiology about this, will certainly go back into high rate A-Fib once we initiate PT/OT.  Can resume coumadin today given INR is now subtherapeutic.  TTE today.  RESP: CT CAP with BL Pleural effusions, R>L. Right chest tube with 1.8l drainage and consistent with an exudate. Repeat imaging with near clearance of right pleural space. Small left sided pleural effusion.  Maintain SpO2 of >92%.  Remove right chest tube.  GI:Prior history of small bowel obstruction status post small bowel resection and since then short gut syndrome and chronic diarrhea  Resume p.o. diet.  Continue Protonix daily for ulcer prophylaxis.  Have talked to nutrition and will do calorie counting.  Malnutrition:    - Level of malnutrition: Moderate   5. 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 Monday, Wednesday and Friday hemodialysis through a left upper extremity fistula.  During his recent hospitalization, he developed a thrombus in this and underwent a thrombectomy of his fistula.  He was subsequently initiated on dialysis through a right tunneled subclavian access.  He is noted to have multiple electrolyte abnormalities most notably hypokalemia and is due to be run against a higher potassium bath. Underwent a dialysis run on 8/29. Will touch base with nephrology about whether or not his AVF can be used so we are able to remove his tunneled access.  Would trend renal function daily and closely follow I/O.  Follow electrolytes and correct as abnormalities arise.  Did receive potassium replacement overnight.  ID:Presents with leukocytosis and septic shock likely secondary to infected wounds in his back.  Follow Blood cultures.  Antibiotics: Continue cefepime and vancomycin.  Have consulted orthopedics to assist in evaluation of his recent ORIF and make recommendations as needed.  HEMATOLOGIC:Noted to have acute onset of anemia and elevated INR. Received a total of 5m of Vitamin K and was administered 1 unit PRBC for Hb<7. INR now subtherapeutic, has been resumed on coumadin.  Daily CBC.  Continue coumadin.  ORTHO: Noted to have left humeral and left patellar fractures s/p ORIF. Is non-weight bearing per ortho notes from [facility]. Have consulted with orthopedics here to have some clarity on PT/OT and when sutures should be removed.  ENDOCRINE:Known medical history of DM and presented with significant hypoglycemia despite ampules of D50 being administered.  He is also noted to be hypothyroid.  Will hold off on sliding scale insulin given minimal PO intake and blood sugars persistently below 180mg/dl.  Accu-Cheks 4 times daily and with meals.  Continue outpatient levothyroxine.  ICU PROPHYLAXIS:Protonix, coumadin.  DISPO:Unclear. Continues to require ICU levels of care.          Total Critical Care Time : 45 minutes.    Mariah Liz MD  Pulmonary and Critical Care  [facility]

## 2024-08-31 NOTE — PROGRESS NOTES
Cardiology Progress Note    Assessment:  Permanent atrial fibrillation with accelerated ventricular response due to sepsis, pain, pleural effusions   Sepsis syndrome  Wide QRS  complex tachycardia, short runs, probably nonsustained VT, aberrant conduction of A-fib less likely  Decreased LV systolic function likely due to systemic illness and tachycardia  End-stage renal disease on dialysis  Morbid obesity status post gastric bypass   Recent fall with multiple fractures  Diabetes mellitus  Troponin elevation, type II injury/decreased clearance  History of hypertension    Plan:  Continue amiodarone drip during the day  Start low-dose beta-blocker    He may need to undergo ischemic evaluation after he has substantial recovery from sepsis and its complications  Not appropriate candidate for invasive cardiac evaluation at this point.    Subjective:   Denies chest pain, bradycardic while asleep but becomes tachycardic when is awake on and off amiodarone drip    Objective:   /72   Pulse (!) 143   Temp 97.9  F (36.6  C) (Oral)   Resp 20   SpO2 90%     Intake/Output Summary (Last 24 hours) at 8/30/2024 1013  Last data filed at 8/30/2024 0811  Gross per 24 hour   Intake 3002.09 ml   Output 300 ml   Net 2702.09 ml         Physical Exam:  GENERAL: no distress  LUNGS: Decreased breath sounds  CARDIAC: irregular  rhythm, S1 & S2 normal.  No heaves, thrills, gallops or murmurs.  ABDOMEN: flat, negative hepatosplenomegaly, soft and non-tender.      Current Facility-Administered Medications Ordered in Epic   Medication Dose Route Frequency Provider Last Rate Last Admin     acetaminophen (TYLENOL) oral liquid 1,000 mg  1,000 mg Oral TID Buffy Morillo MD   1,000 mg at 08/31/24 0815     alteplase (CATHFLO ACTIVASE) injection 2 mg  2 mg Intracatheter Q1H PRN Vania De Paz PA-C         alteplase (CATHFLO ACTIVASE) injection 2 mg  2 mg Intracatheter Q1H PRN Vania De Paz PA-C         amiodarone (NEXTERONE) 1.8  mg/mL in dextrose 5% 200 mL ADULT STANDARD infusion  0.5 mg/min Intravenous Continuous Latanya Auguste MD 16.7 mL/hr at 08/31/24 0840 0.5 mg/min at 08/31/24 0840     artificial saliva (BIOTENE MT) solution 1 spray  1 spray Mouth/Throat 4x Daily PRN Jose Hamilton MD         aspirin EC tablet 81 mg  81 mg Oral BID Jose Hamilton MD   81 mg at 08/31/24 0818     atorvastatin (LIPITOR) tablet 10 mg  10 mg Oral At Bedtime Jose Hamilton MD   10 mg at 08/30/24 2233     benzocaine-menthol (CEPACOL) 15-3.6 MG lozenge 1 lozenge  1 lozenge Buccal Q1H PRN Jose Hamilton MD         calcium carbonate (TUMS) chewable tablet 1,000 mg  1,000 mg Oral 4x Daily PRN Jose Hamilton MD         ceFEPIme (MAXIPIME) 1 g vial to attach to  mL bag for ADULTS or NS 50 mL bag for PEDS  1 g Intravenous Q24H Hardeep Major DO         collagenase (SANTYL) ointment   Topical Daily Jose Hamilton MD   Given at 08/30/24 1443     hydrocortisone sodium succinate PF (solu-CORTEF) injection 50 mg  50 mg Intravenous Q6H Mariah Liz MD   50 mg at 08/31/24 0531     levothyroxine (SYNTHROID/LEVOTHROID) tablet 50 mcg  50 mcg Oral QAM AC Jose Hamilton MD   50 mcg at 08/31/24 0818     lidocaine (LMX4) cream   Topical Q1H PRN Jose Hamilton MD         lidocaine (LMX4) cream   Topical Q1H PRN Julianne Sahu MD         lidocaine 1 % 0.1-1 mL  0.1-1 mL Other Q1H PRN Jose Hamilton MD         lidocaine 1 % 0.1-5 mL  0.1-5 mL Other Q1H PRN Julianne Sahu MD         lidocaine-prilocaine (EMLA) cream   Topical Daily PRN Jose Hamilton MD         melatonin tablet 1 mg  1 mg Oral At Bedtime PRN Jose Hamilton MD         [Held by provider] methocarbamol (ROBAXIN) tablet 750 mg  750 mg Oral TID Jose Hamilton MD   750 mg at 08/28/24 1942     metoprolol (LOPRESSOR) injection 5 mg  5 mg Intravenous Q5 Min PRN David Donis MD         midodrine (PROAMATINE) tablet 10 mg  10 mg Oral TID w/meals Idania Crenshaw MD   10 mg at 08/31/24 0818     naloxone  (NARCAN) injection 0.2 mg  0.2 mg Intravenous Q2 Min PRN Hardeep Major DO        Or     naloxone (NARCAN) injection 0.4 mg  0.4 mg Intravenous Q2 Min PRN Hardeep Major DO        Or     naloxone (NARCAN) injection 0.2 mg  0.2 mg Intramuscular Q2 Min PRN Hardeep Major DO        Or     naloxone (NARCAN) injection 0.4 mg  0.4 mg Intramuscular Q2 Min PRN Hardeep Major DO         norepinephrine (LEVOPHED) 16 mg in sodium chloride 0.9 % 250 mL infusion CENTRAL  0.01-0.6 mcg/kg/min Intravenous Continuous Idania Crenshaw MD 3.6 mL/hr at 08/31/24 0911 0.05 mcg/kg/min at 08/31/24 0911     ondansetron (ZOFRAN ODT) ODT tab 4 mg  4 mg Oral Q6H PRN Jose Hamilton MD        Or     ondansetron (ZOFRAN) injection 4 mg  4 mg Intravenous Q6H PRN Jose Hamilton MD         oxyBUTYnin (DITROPAN) tablet 5 mg  5 mg Oral QAM Jose Hamilton MD   5 mg at 08/31/24 0817     oxyCODONE (ROXICODONE) tablet 5 mg  5 mg Oral Q4H PRN Hardeep Major DO         pantoprazole (PROTONIX) EC tablet 40 mg  40 mg Oral BID AC Hardeep Major DO         polyethylene glycol (MIRALAX) Packet 17 g  17 g Oral Daily Jose Hamilton MD   17 g at 08/31/24 0819     prochlorperazine (COMPAZINE) injection 5 mg  5 mg Intravenous Q6H PRN Jose Hamilton MD        Or     prochlorperazine (COMPAZINE) tablet 5 mg  5 mg Oral Q6H PRN Jose Hamilton MD        Or     prochlorperazine (COMPAZINE) suppository 12.5 mg  12.5 mg Rectal Q12H PRN Jose Hamilton MD         senna-docusate (SENOKOT-S/PERICOLACE) 8.6-50 MG per tablet 1 tablet  1 tablet Oral BID PRN Jose Hamilton MD        Or     senna-docusate (SENOKOT-S/PERICOLACE) 8.6-50 MG per tablet 2 tablet  2 tablet Oral BID PRN Jose Hamilton MD         sodium chloride (PF) 0.9% PF flush 10 mL  10 mL Intracatheter Q15 Min PRN Vania De Paz PA-C         sodium chloride (PF) 0.9% PF flush 10 mL  10 mL Intracatheter Q15 Min PRN Vania De Paz PA-C         sodium chloride (PF) 0.9% PF flush 10-40 mL  10-40 mL Intracatheter Once  PRN Julianne Sahu MD         sodium chloride (PF) 0.9% PF flush 3 mL  3 mL Intracatheter q1 min prn Hardeep Major,          sodium chloride (PF) 0.9% PF flush 3 mL  3 mL Intracatheter Q8H Hardeep Major DO   3 mL at 08/31/24 0531     sodium chloride (PF) 0.9% PF flush 3 mL  3 mL Intracatheter Q8H Jose Hamilton MD   3 mL at 08/31/24 0157     sodium chloride (PF) 0.9% PF flush 3 mL  3 mL Intracatheter q1 min prn Jose Hamilton MD         sodium chloride 0.9% BOLUS 1-250 mL  1-250 mL Intravenous Q1H PRN Hardeep Major DO         sodium chloride 0.9% BOLUS 100-150 mL  100-150 mL Intravenous Q15 Min PRVania Dominguez PA-C         urea (GORMEL) 20 % cream CREA   Topical Daily Jose Hamilton MD   Given at 08/30/24 1444     vancomycin place maxwell - receiving intermittent dosing  1 each Intravenous See Admin Instructions Hardeep Major DO         vasopressin (VASOSTRICT) 20 Units in sodium chloride 0.9 % 100 mL standard conc infusion  2.4 Units/hr Intravenous Continuous Hardeep Major DO         warfarin ANTICOAGULANT (COUMADIN) tablet 2.5 mg  2.5 mg Oral ONCE at 18:00 Hardeep Major DO         Warfarin Dose Required Daily - Pharmacist Managed  1 each Oral See Admin Instructions Mariah Liz MD         wound support modular (EXPEDITE) bottle 60 mL  60 mL Oral Daily Orlando Villasenor, RD   60 mL at 08/30/24 1127     No current [facility]-ordered outpatient medications on file.       Cardiographics:    Telemetry: A-fib     Echo:  The left ventricle is normal in size.  The visual ejection fraction is 35-40%.  There is moderate global hypokinesia of the left ventricle.  The right ventricle is moderately dilated.  Moderately decreased right ventricular systolic function  There is mild to moderate (1-2+) mitral regurgitation.  There is mild (1+) aortic regurgitation.  RVSP 29mmHg + RAP  Compared to the prior study dated 6/20/2022 (report in Care Everywhere) the EF  has decreased and the MR has increased   Lab Results   daughter-in-law "  Chemistry/lipid CBC Cardiac Enzymes/BNP/TSH/INR   No results for input(s): \"CHOL\", \"HDL\", \"LDL\", \"TRIG\", \"CHOLHDLRATIO\" in the last 01049 hours.  No results for input(s): \"LDL\" in the last 49885 hours.  Recent Labs   Lab Test 08/30/24  0846 08/30/24  0519   NA  --  133*   POTASSIUM  --  3.4   CHLORIDE  --  98   CO2  --  25   * 189*   BUN  --  24.4*   CR  --  2.77*   GFRESTIMATED  --  23*   MELISSA  --  7.7*     Recent Labs   Lab Test 08/30/24  0519 08/29/24  1257 08/29/24  0606   CR 2.77* 3.92* 3.96*     Recent Labs   Lab Test 08/01/22  1334   A1C 4.8          Recent Labs   Lab Test 08/30/24  0519   WBC 8.9   HGB 7.8*  7.8*   HCT 22.9*   MCV 97        Recent Labs   Lab Test 08/30/24  0519 08/30/24  0109 08/29/24  1818   HGB 7.8*  7.8* 8.1* 8.3*    No results for input(s): \"TROPONINI\" in the last 85693 hours.  Recent Labs   Lab Test 08/28/24  1231   NTBNPI 39,340*     Recent Labs   Lab Test 08/28/24  1231   TSH 16.20*     Recent Labs   Lab Test 08/30/24  0519 08/29/24  1336 08/29/24  0113   INR 1.41* 2.19* 5.81*                   "

## 2024-08-31 NOTE — CONSULTS
Consultation - left elbow and left patella fractures.    Ijeoma Polk,  1950, MRN [id number]    Admitting Dx: Hypokalemia [E87.6]  Lactic acidosis [E87.20]  Hypoglycemia [E16.2]  Atrial fibrillation with rapid ventricular response (H) [I48.91]  Supratherapeutic INR [R79.1]  ESRD on hemodialysis (H) [N18.6, Z99.2]  Transient hypotension [I95.9]    PCP: Outside, Provider, None   Code status:  Full Code        Extended Emergency Contact Information  Primary Emergency Contact: Austin Polk   [place]  Mobile Phone: [phone]  Relation: Son  Secondary Emergency Contact: Trip Polk  Mobile Phone: [phone]  Relation: Daughter-in-Law        Assessment and Plan   Left as well as left distal humerus and olecranon fractures treated with ORIF in [place].  Surgery by Dr. Salvador on 2024.  NWB on both upper and lower extremities on the left.  Continue NWB.  Sutures Left patella could probably be removed next week but small area of central necrosis midportion of incision.  Normally would start to allow weightbearing as tolerated in extension for the knee but will hold off until incision appears well-healed and sutures are removed.  Hand service to evaluate Left elbow.    Active Problems:    Hypokalemia    Lactic acidosis    Hypoglycemia    Atrial fibrillation with rapid ventricular response (H)    Supratherapeutic INR    ESRD on hemodialysis (H)    Transient hypotension       Chief Complaint <principal problem not specified>       HPI    We have been requested to evaluate Ijeoma Polk for left elbow and patellar injury.  The patient is unclear on the exact history but according to the records he was at a friend's house and had a fall on 2024.  He was in [place].  He underwent ORIF of his distal humerus, olecranon and patella.  He has been nonweightbearing on both upper and lower extremities on the left.  He was then transferred to a TCU and was admitted to [facility] after a hypotensive  episode while undergoing dialysis.  He has a history of end-stage renal disease and atrial fibrillation.  He also sustained rib fractures and a sternal fracture and small volume subarachnoid hemorrhage at the same time.  He was found to have a potentially infected sacral ulcer as well as a large right pleural effusion.     Medical History  He  has a past medical history of Chronic diarrhea, End stage renal disease (H), H/O gastric bypass, History of bowel resection, Hypertension, surgical complication (3/9/2009), Hypokalemia, Membranous nephropathy determined by biopsy, PAF (paroxysmal atrial fibrillation) (H), and Type 2 diabetes mellitus without complication, without long-term current use of insulin (H) (08/10/2022).    He has no past medical history of Asymptomatic human immunodeficiency virus (HIV) infection status (H), Cancer (H), Hepatitis, or History of blood transfusion.  Surgical History  He  has no past surgical history on file.   Social History  Reviewed, and he  reports that he has never smoked. He has never used smokeless tobacco. He reports that he does not currently use alcohol. He reports that he does not use drugs.   Allergies  Allergies   Allergen Reactions    Furosemide Hives    Lorazepam Rash    Penicillins Rash    Sulfadiazine Rash    Family History  Reviewed, and family history includes Alcoholism in his father; Emphysema in his mother.   Social History  He  reports that he has never smoked. He has never used smokeless tobacco. He reports that he does not currently use alcohol. He reports that he does not use drugs.   Additional psychosocial needs reviewed per nursing assessment.     Prior to Admission Medications   Medications Prior to Admission   Medication Sig Dispense Refill Last Dose    acetaminophen (TYLENOL) 500 MG tablet Take 1,000 mg by mouth 3 times daily.   8/28/2024 at AM; 1,000 mg (1 g total today), 1 of 3    aspirin 81 MG EC tablet Take 81 mg by mouth 2 times daily.   8/28/2024 at  AM; 1 of 2    atorvastatin (LIPITOR) 10 MG tablet TAKE 1 TABLET BY MOUTH ONCE DAILY AT BEDTIME   8/27/2024 at PM (1800)    B Complex-C-Folic Acid (TATY-BRIT RX) 1 mg TABS Take 1 tablet by mouth daily   8/28/2024 at AM    bisacodyl (DULCOLAX) 10 MG suppository Place 10 mg rectally every 72 hours as needed for constipation   Unknown at PRN    diphenoxylate-atropine (LOMOTIL) 2.5-0.025 MG tablet Take 1 tablet by mouth every 8 hours as needed for diarrhea 30 tablet 0 Past Week at 8/23    levothyroxine (EUTHYROX) 50 MCG tablet Take 1 tablet by mouth daily   8/28/2024 at AM    Lidocaine (LIDOCARE) 4 % Patch Place 1 patch onto the skin every 24 hours. To prevent lidocaine toxicity, patient should be patch free for 12 hrs daily.   8/28/2024 at AM; has one on    lidocaine-prilocaine (EMLA) 2.5-2.5 % external cream Apply topically daily as needed for moderate pain   Unknown at PRN    loperamide (IMODIUM) 2 MG capsule Take 2 mg by mouth 4 times daily as needed for diarrhea (may take 1 capsule after each loose stool. do not exceed more than 16 mg in one day (8 capsules)).   Unknown at PRN    Melatonin 10 MG TABS tablet Take 10 mg by mouth nightly as needed for sleep (this is in addition to the nightly 3 mg scheduled dose).   Past Week at HS    melatonin 3 MG tablet Take 3 mg by mouth at bedtime. This is scheduled in addition to the 10 mg at bedtime PRN dose   8/27/2024 at HS    methocarbamol (ROBAXIN) 750 MG tablet Take 750 mg by mouth 3 times daily.   8/28/2024 at AM; 1 of 3    methoxy PEG-Epoetin Beta (MIRCERA) 30 MCG/0.3ML SOSY injectable syringe Inject 30 mcg into the vein every 14 days.   Past Week at 8/23    metoprolol succinate ER (TOPROL XL) 50 MG 24 hr tablet Take 50 mg by mouth at bedtime. Hold for SBP <100   8/27/2024 at HS    Omega-3 Fatty Acids (OMEGA 3 PO) Take 1 tablet by mouth 3 times daily. Strength not known   8/28/2024 at AM; 1 of 3    omeprazole (PRILOSEC OTC) 20 MG EC tablet Take 20 mg by mouth 2 times  daily.   8/28/2024 at AM;1 of 2    oxyBUTYnin (DITROPAN) 5 MG tablet Take 5 mg by mouth every morning.   8/28/2024 at AM    oxyCODONE (ROXICODONE) 5 MG tablet Take 2 tablets (10 mg) by mouth every 4 hours as needed for severe pain. 40 tablet 0 8/27/2024 at AM    polyethylene glycol (MIRALAX) 17 g packet Take 1 packet by mouth daily   8/28/2024 at AM    senna-docusate (SENOKOT-S/PERICOLACE) 8.6-50 MG tablet Take 2 tablets by mouth daily   8/28/2024 at AM    Urea 20 20 % LOTN Externally apply 1 Application topically daily. (Site not specified on MAR)   8/28/2024 at AM    warfarin ANTICOAGULANT (COUMADIN) 5 MG tablet Take 5 mg by mouth daily. (Currently on HOLD as of 8/23)   Past Month at 8/22 PM          Review of Systems:  Pertinent items are noted in HPI. Physical Exam:  Temp:  [97.3  F (36.3  C)-98.6  F (37  C)] 97.6  F (36.4  C)  Pulse:  [] 152  Resp:  [7-30] 25  BP: ()/(50-89) 99/60  SpO2:  [64 %-100 %] 100 %    He is alert and oriented x 3.  Examination of the left leg reveals a anterior midline incision.  There is no increased warmth erythema but the central portion of the incision does appear slightly necrotic.  There is no surrounding erythema and no active drainage.  He seems able to actively extend the knee.  He seems to be able to dorsi and plantarflex at the ankle.  Sensation is grossly intact to light touch.  Dorsalis pedis and posterior tibialis pulses are palpable.  Calf is soft nontender and Achilles is intact.    He is able to wiggle his fingers.  His left arm is in a well-padded splint.       Pertinent Labs  Lab Results: personally reviewed.   not applicable     Pertinent Radiology  Radiology Results: Left knee demonstrates 2 cannulated screws with cerclage wire.  Fracture appears well reduced.  No evidence of loose or broken hardware.  There is underlying significant arthrosis involving the left knee.       Tawnya Harden MD

## 2024-08-31 NOTE — PROGRESS NOTES
HEMODIALYSIS TREATMENT NOTE    Date: 8/31/2024  Time: 3.15 PM    Data:  Pre Wt: 88.0 kg (167 lb 15.9 oz)   Desired Wt:2-3   kg   Post Wt:  85.0 kg  Weight change: 3.0  kg  Ultrafiltration - Post Run Net Total Removed (mL):3000 mL  Vascular Access Status: CVC  patent  Dialyzer Rinse: Streaked, Light  Total Blood Volume Processed:81.7  L   Total Dialysis (Treatment) Time: 3.0 hours   Dialysate Bath: K 3, Ca 3  Heparin: None    Lab:   Yes : Hep B Antigen & Antibody    Interventions:  3.5 hours HD done through RCVC,  with 600 DFR.  5% Albumin machine prime prior HD.  25 % Albumin 50 mL x2 were given to support blood pressure.  Levo dose adjusted by primary during run to support blood pressure.  SBP holding above 90's  throughout treatment.  HR A Fib throughout the treatment but pt asymptomatic.  Pt UF adjusted according to pt hemodynamic status & Crit-line.  3.0 kg UF pulled, Crit-line steady with -13% A profile.  Post HD, blood rinsed back, CVC heparin locked, handoff report given & Pt  in his room in a stable condition    Assessment:  Pt alert, denies chest pain, breathing comfortably in room air.  Monitoring every 15 minutes & PRN.  CVC dressing CDI      Plan:    For UF only treatment  tomorrow.  Note : Pt more stable towards the end of his HD run, MD informed with okay to increased his HD time by 30 minutes in order to pull 3.0 kg UF . Same done , no issue.

## 2024-08-31 NOTE — PROVIDER NOTIFICATION
1902: Called tele ICU to obtain orders for low dose sliding scale insulin, per daytime intensivist.  Tele/ICU RN will pass message along to oncoming MD.   Marian Meyer RN    1933: Spoke with CHIRAG Saenz tele/ICU.  Per intensivist, we will continue to monitor patients BG and if they begin to rise, they will readdress.  Updated NOC Dana BEARD.

## 2024-09-01 NOTE — PROGRESS NOTES
HEMODIALYSIS TREATMENT NOTE    Date: 9/1/2024  Time: 6:15 PM    Data:  Pre Wt: 86.5 kg (190 lb 11.2 oz)   Desired Wt:2-4   kg   Post Wt: 84.5 kg (186 lb 4.6 oz)  Weight change: 2 kg  Ultrafiltration - Post Run Net Total Removed (mL): 2000 mL  Vascular Access Status: patent  Dialyzer Rinse: Streaked, Light  Total Blood Volume Processed: 0 L   Total Dialysis (Treatment) Time: 2.0     Lab:   No    Interventions:  2.0 hours UF only treatment  done via RCVC, .  5% Albumin 250 mL  machine prime done.   Mid run SBP becoming soft below 90's , 25% Albumin 50 x2 were given with good effect.  SBP rebounding, UF adjusted according to pt hemodynamic status.  2.0 kg UF pulled, Crit-line steady with A profile at the end of HD run.  Post HD , blood rinsed back, CVC saline locked, handoff report given & pt left in a stable condition.     Assessment:  Pt alert, denies pain, calm & co operative.  Monitoring every 15 minutes & PRN.  CVC dressing CDI     Plan:    Per Renal Team

## 2024-09-01 NOTE — PLAN OF CARE
Chest tube removed yesterday, site dressing was reinforced on prior shift. No dressing interventions needed on overnights.  Patient having tough time getting comfortable, pain medication given x1 with mild relief.  Map remains above 65 with levo running at 0.06 and amio at 0.5.

## 2024-09-01 NOTE — PROGRESS NOTES
Cardiology Progress Note    Assessment:  Permanent atrial fibrillation with accelerated ventricular response due to sepsis, pain, pleural effusions   Sepsis syndrome  Wide QRS  complex tachycardia, short runs, probably nonsustained VT, aberrant conduction of A-fib less likely, suppressed with IV amiodarone  Decreased LV systolic function likely due to systemic illness and tachycardia  End-stage renal disease on dialysis  Morbid obesity status post gastric bypass   Recent fall with multiple fractures  Diabetes mellitus  Troponin elevation, type II injury/decreased clearance  History of hypertension    Plan:  Continue current dose of p.o. metoprolol  Switch IV to p.o. amiodarone because of wide QRS tachycardia presumably nonsustained VT and decreased LV systolic function    He may need to undergo ischemic evaluation after he has substantial recovery from sepsis and its complications  Not appropriate candidate for invasive cardiac evaluation at this point.    Subjective:   Denies chest pain or increasing shortness of breath    Objective:   /57   Pulse (!) 122   Temp 98.3  F (36.8  C) (Oral)   Resp 18   Wt 88 kg (194 lb 0.1 oz)   SpO2 100%   BMI 26.31 kg/m      Intake/Output Summary (Last 24 hours) at 8/30/2024 1013  Last data filed at 8/30/2024 0811  Gross per 24 hour   Intake 3002.09 ml   Output 300 ml   Net 2702.09 ml         Physical Exam:  GENERAL: no distress, mildly confused  LUNGS: Decreased breath sounds  CARDIAC: irregular  rhythm, S1 & S2 normal.  No heaves, thrills, gallops or murmurs.  ABDOMEN: flat, negative hepatosplenomegaly, soft and non-tender.      Current Facility-Administered Medications Ordered in Epic   Medication Dose Route Frequency Provider Last Rate Last Admin     acetaminophen (TYLENOL) oral liquid 1,000 mg  1,000 mg Oral TID Buffy Morillo MD   1,000 mg at 08/31/24 2140     albumin human 25 % injection 50 mL  50 mL Intravenous Q1H PRN Vania De Paz PA-C   50 mL at  08/31/24 1334     albumin human 5 % injection  mL   mL Intravenous Q1H PRN Vania De Paz PA-C         alteplase (CATHFLO ACTIVASE) injection 2 mg  2 mg Intracatheter Q1H PRN Vania De Paz PA-C         alteplase (CATHFLO ACTIVASE) injection 2 mg  2 mg Intracatheter Q1H PRN Vania De Paz PA-C         amiodarone (PACERONE) tablet 200 mg  200 mg Oral Daily David Donis MD         artificial saliva (BIOTENE MT) solution 1 spray  1 spray Mouth/Throat 4x Daily PRN Jose Hamilton MD         aspirin EC tablet 81 mg  81 mg Oral BID Jose Hamilton MD   81 mg at 09/01/24 0837     atorvastatin (LIPITOR) tablet 10 mg  10 mg Oral At Bedtime Jose Hamilton MD   10 mg at 08/31/24 2140     benzocaine-menthol (CEPACOL) 15-3.6 MG lozenge 1 lozenge  1 lozenge Buccal Q1H PRN Jose Hamilton MD         calcium carbonate (TUMS) chewable tablet 1,000 mg  1,000 mg Oral 4x Daily PRN Jose Hamilton MD         collagenase (SANTYL) ointment   Topical Daily Jsoe Hamilton MD   Given at 09/01/24 0851     hydrocortisone sodium succinate PF (solu-CORTEF) injection 25 mg  25 mg Intravenous Q8H Mariah Liz MD   25 mg at 09/01/24 0844     levothyroxine (SYNTHROID/LEVOTHROID) tablet 50 mcg  50 mcg Oral QAM AC Jose Hamilton MD   50 mcg at 09/01/24 0837     lidocaine (LMX4) cream   Topical Q1H PRN Jose Hamilton MD         lidocaine 1 % 0.1-1 mL  0.1-1 mL Other Q1H PRN Jose Hamilton MD         lidocaine-prilocaine (EMLA) cream   Topical Daily PRN Jose Hamilton MD         magnesium oxide (MAG-OX) tablet 400 mg  400 mg Oral Q4H Hardeep Major DO   400 mg at 09/01/24 0837     melatonin tablet 1 mg  1 mg Oral At Bedtime PRN Jose Hamilton MD         [Held by provider] methocarbamol (ROBAXIN) tablet 750 mg  750 mg Oral TID Jose Hamilton MD   750 mg at 08/28/24 1942     metoprolol (LOPRESSOR) injection 5 mg  5 mg Intravenous Q5 Min PRN David Donis MD         metoprolol tartrate (LOPRESSOR) tablet 25 mg  25 mg Oral or  Feeding Tube BID David Donis MD   25 mg at 09/01/24 0837     midodrine (PROAMATINE) tablet 10 mg  10 mg Oral TID w/meals Idania Crenshaw MD   10 mg at 09/01/24 0837     naloxone (NARCAN) injection 0.2 mg  0.2 mg Intravenous Q2 Min PRN Hardeep Major DO        Or     naloxone (NARCAN) injection 0.4 mg  0.4 mg Intravenous Q2 Min PRN Hardeep Major DO        Or     naloxone (NARCAN) injection 0.2 mg  0.2 mg Intramuscular Q2 Min PRN Hardeep Major DO        Or     naloxone (NARCAN) injection 0.4 mg  0.4 mg Intramuscular Q2 Min PRN Hardeep Major DO         norepinephrine (LEVOPHED) 16 mg in sodium chloride 0.9 % 250 mL infusion CENTRAL  0.01-0.6 mcg/kg/min Intravenous Continuous ShlaondaMariah fish MD   Paused at 09/01/24 0924     ondansetron (ZOFRAN ODT) ODT tab 4 mg  4 mg Oral Q6H PRN Jose Hamilton MD        Or     ondansetron (ZOFRAN) injection 4 mg  4 mg Intravenous Q6H PRN Jose Hamilton MD         oxyBUTYnin (DITROPAN) tablet 5 mg  5 mg Oral QAM Jose Haimlton MD   5 mg at 09/01/24 0837     oxyCODONE (ROXICODONE) tablet 5 mg  5 mg Oral Q4H PRN Hardeep Major DO   5 mg at 09/01/24 0059     pantoprazole (PROTONIX) EC tablet 40 mg  40 mg Oral BID AC Hardeep Major DO   40 mg at 09/01/24 0837     polyethylene glycol (MIRALAX) Packet 17 g  17 g Oral Daily Jose Hamilton MD   17 g at 09/01/24 0838     prochlorperazine (COMPAZINE) injection 5 mg  5 mg Intravenous Q6H PRN Jose Hamilton MD        Or     prochlorperazine (COMPAZINE) tablet 5 mg  5 mg Oral Q6H PRN Jose Hamilton MD        Or     prochlorperazine (COMPAZINE) suppository 12.5 mg  12.5 mg Rectal Q12H PRN Jose Hamilton MD         senna-docusate (SENOKOT-S/PERICOLACE) 8.6-50 MG per tablet 1 tablet  1 tablet Oral BID PRN Jose Hamilton MD        Or     senna-docusate (SENOKOT-S/PERICOLACE) 8.6-50 MG per tablet 2 tablet  2 tablet Oral BID PRN Jose Hamilton MD         sodium chloride (PF) 0.9% PF flush 10 mL  10 mL Intracatheter Q15 Min PRN Vania De Paz  DOMINIQUE GARCIAC         sodium chloride (PF) 0.9% PF flush 10-40 mL  10-40 mL Intracatheter Once PRN Julianne Sahu MD         sodium chloride (PF) 0.9% PF flush 3 mL  3 mL Intracatheter q1 min prn Hardeep Major C, DO         sodium chloride (PF) 0.9% PF flush 3 mL  3 mL Intracatheter Q8H Hardeep Major, DO   3 mL at 08/31/24 2141     sodium chloride (PF) 0.9% PF flush 3 mL  3 mL Intracatheter Q8H Jose Hamilton MD   3 mL at 09/01/24 0911     sodium chloride (PF) 0.9% PF flush 3 mL  3 mL Intracatheter q1 min prn Jose Hamilton MD         sodium chloride (PF) 0.9% PF flush 9 mL  9 mL Intracatheter During Dialysis/CRRT (from stock) Vania De Paz PA-EL         sodium chloride (PF) 0.9% PF flush 9 mL  9 mL Intracatheter During Dialysis/CRRT (from stock) Vania De Paz PA-EL         sodium chloride 0.9% BOLUS 100-150 mL  100-150 mL Intravenous Q15 Min PRN Vania De Paz PA-EL         sodium chloride 0.9% BOLUS 100-150 mL  100-150 mL Intravenous Q15 Min PRN Vania De Paz PA-C         urea (GORMEL) 20 % cream CREA   Topical Daily Jose Hamilton MD   Given at 09/01/24 0851     Warfarin Dose Required Daily - Pharmacist Managed  1 each Oral See Admin Instructions Mariah Liz MD         wound support modular (EXPEDITE) bottle 60 mL  60 mL Oral Daily Orlando Villasenor, RD   60 mL at 09/01/24 0909     No current [facility]-ordered outpatient medications on file.       Cardiographics:    Telemetry: A-fib     Echo:  The left ventricle is normal in size.  The visual ejection fraction is 35-40%.  There is moderate global hypokinesia of the left ventricle.  The right ventricle is moderately dilated.  Moderately decreased right ventricular systolic function  There is mild to moderate (1-2+) mitral regurgitation.  There is mild (1+) aortic regurgitation.  RVSP 29mmHg + RAP  Compared to the prior study dated 6/20/2022 (report in Care Everywhere) the EF  has decreased and the MR has increased   Lab Results    Chemistry/lipid CBC Cardiac  "Enzymes/BNP/TSH/INR   No results for input(s): \"CHOL\", \"HDL\", \"LDL\", \"TRIG\", \"CHOLHDLRATIO\" in the last 24897 hours.  No results for input(s): \"LDL\" in the last 48812 hours.  Recent Labs   Lab Test 08/30/24  0846 08/30/24  0519   NA  --  133*   POTASSIUM  --  3.4   CHLORIDE  --  98   CO2  --  25   * 189*   BUN  --  24.4*   CR  --  2.77*   GFRESTIMATED  --  23*   MELISSA  --  7.7*     Recent Labs   Lab Test 08/30/24  0519 08/29/24  1257 08/29/24  0606   CR 2.77* 3.92* 3.96*     Recent Labs   Lab Test 08/01/22  1334   A1C 4.8          Recent Labs   Lab Test 08/30/24  0519   WBC 8.9   HGB 7.8*  7.8*   HCT 22.9*   MCV 97        Recent Labs   Lab Test 08/30/24  0519 08/30/24  0109 08/29/24  1818   HGB 7.8*  7.8* 8.1* 8.3*    No results for input(s): \"TROPONINI\" in the last 54006 hours.  Recent Labs   Lab Test 08/28/24  1231   NTBNPI 39,340*     Recent Labs   Lab Test 08/28/24  1231   TSH 16.20*     Recent Labs   Lab Test 08/30/24  0519 08/29/24  1336 08/29/24  0113   INR 1.41* 2.19* 5.81*                   "

## 2024-09-01 NOTE — PROGRESS NOTES
RENAL PROGRESS NOTE    ASSESSMENT & PLAN:     - UF run today for fluid removal, gentle pull avoid hypotension  - Order placed for fistulagram for this week. 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  Runs now at [facility] under Cares of Dr Helm  MWF 3.5 TT, K3 bath  S/P Thrombectomy 8/12/24 at [facility] , this arm access has not been utilized since this time.    Has CVC, placed 8/14 following fracture of arm       HD 8/31, 3 L removed     UF run planned today 9/1, will plan for a very gentle run, only as tolerated, to avoid hypotension. Discussed with intensivist.     Unclear functional status of Left AVF, throbectomy 8/12 and not cannulated since this time, utilizing cvc.  Plan for fistulagram early this week during weekday (labor day tomorrow)        Hypokalemia  Low recently since mid August, available historical labs normal-high  In dialysis clinic has been encouraged to increase dietary K   Is on K3 bath typically   K OK today    Hypervolemia  Poor HD runs outpt due to hypotension  Ran on Thursday but little volume removal due to BP  Chest tube in place w 1800mL net out per nurse report  Albumin with HD to attempt to maintain hemodynamics to be able to UF  Gentle UF run today if able to tolerate      MBD-CKD - phos at goal, calcium with correction lower end of normal     HTN - metoprolol  Hypotensive,and now on pressors now        DM2 - no noted diabetic medications. Hypoglycemia noted, ER/hospital following.       Afib / Vtach - metoprolol  S/p amiodarone infusion   Cardiology following     HLD- statin     Thyroid disease - levothyroxine   TSH very high, ? Taking medication as Rx PTA     SUBJECTIVE:    Seen in room  Pt says breathing better  Good appetite  Seems somewhat confused why he is hear. Explained removal of  fluids, fluid in lungs  No NV reported  Asked pt about hx of his arm access and when last utilized, pt very poor historian, has no idea  Review case with intensivist, cautions UF run today, very gentle pull if pull at all. Message relayed to HD-nurse.  We also reviewed arm access, fistulagram / throbectomy / fracture. Ortho did remove cast. Not clear when access last used. Plan for fisutlagram later in week. Use CVC in meantime          OBJECTIVE:  Physical Exam   Temp: 98.6  F (37  C) Temp src: Axillary BP: 105/56 Pulse: 84   Resp: 15 SpO2: 97 % O2 Device: None (Room air) Oxygen Delivery: 2 LPM  Vitals:    08/31/24 1237   Weight: 88 kg (194 lb 0.1 oz)     Vital Signs with Ranges  Temp:  [97.5  F (36.4  C)-98.6  F (37  C)] 98.6  F (37  C)  Pulse:  [] 84  Resp:  [7-30] 15  BP: ()/(50-90) 105/56  SpO2:  [64 %-100 %] 97 %  I/O last 3 completed shifts:  In: 1081.12 [P.O.:750; I.V.:331.12]  Out: 3254 [Urine:250; Other:3004]        Patient Vitals for the past 72 hrs:   Weight   08/31/24 1237 88 kg (194 lb 0.1 oz)   [unfilled]    PHYSICAL EXAM:  General: sitting up in bed, awake, alert, oriented. Poor historican  HEENT:  Pupils equal, round, no scleral icterus  NECK:  no carotid bruits, no JVD  RESPIRATORY:  Lungs without crackles on anterior auscultation   CARDIOVASCULAR:  RRR, no murmur  VOLUME: pitting edema, less tense fludi filled today   ABDOMEN:  soft, BS present, non-tender, non-distended   GENITOURINARY:  No sanches, not urinating   INTEGUMENTARY: Warm, dry, no rash  NEUROLOGIC: grossly intact   Psych: calm, cooperative  ACCESS:   Cvc  Left upper AVF - bruit heard, no palpable thrill on exam     LABORATORY STUDIES:     Recent Labs   Lab 08/30/24  1206 08/30/24  0519 08/30/24  0109 08/29/24  1818 08/29/24  1336 08/29/24  1257 08/29/24  0113 08/28/24  1231 08/27/24  0728   WBC  --  8.9  --   --  10.2 10.4 7.4 12.7* 10.7   RBC  --  2.37*  --   --  2.22* 2.13* 3.42* 2.89* 2.45*   HGB 8.6* 7.8*  7.8* 8.1*  8.3* 7.2* 7.0* 11.2* 9.5* 8.0*   HCT  --  22.9*  --   --  21.5* 20.8* 32.7* 29.0* 24.9*   PLT  --  234  --   --  328 318 217 469* 333       Basic Metabolic Panel:  Recent Labs   Lab 09/01/24  0525 08/31/24  2126 08/31/24  1738 08/31/24  1202 08/31/24  0541 08/30/24  1834 08/30/24  1646 08/30/24  1640 08/30/24  1206 08/30/24  0846 08/30/24  0519 08/29/24  1647 08/29/24  1257 08/29/24  1027 08/29/24  0606 08/28/24  2130 08/28/24  1927 08/28/24  1248 08/28/24  1231 08/27/24  0728   NA  --   --   --   --   --   --   --   --   --   --  133*  --  135  --  131*  --  132*  --  133*  132* 134*   POTASSIUM 3.6  --   --   --  4.0  --   --  3.8 3.5  --  3.4  --  3.4   < > 2.8*  2.8*   < > 2.7*  --  3.3*  3.6 2.5*   CHLORIDE  --   --   --   --   --   --   --   --   --   --  98  --  94*  --  93*  --  93*  --  90*  90* 92*   CO2  --   --   --   --   --   --   --   --   --   --  25  --  25  --  26  --  23  --  21*  21* 30*   BUN  --   --   --   --   --   --   --   --   --   --  24.4*  --  38.9*  --  38.0*  --  36.5*  --  36.0*  34.7* 28.9*   CR  --   --   --   --   --   --   --   --   --   --  2.77*  --  3.92*  --  3.96*  --  3.83*  --  3.99*  3.86* 3.30*   GLC  --  129* 154* 160* 174* 170* 205*  --   --    < > 189*   < > 121*   < > 87  87   < > 127*   < > 98  64* 59*   MELISSA  --   --   --   --   --   --   --   --   --   --  7.7*  --  6.7*  --  7.5*  --  7.4*  --  7.4*  7.9* 7.3*    < > = values in this interval not displayed.       INR  Recent Labs   Lab 09/01/24  0525 08/31/24  0541 08/30/24  1835 08/30/24  0519   INR 1.66* 1.29* 1.28* 1.41*        Recent Labs   Lab Test 09/01/24  0525 08/31/24  0541 08/30/24  1835 08/30/24  1206 08/30/24  0519 08/29/24  1818 08/29/24  1336   INR 1.66* 1.29*   < >  --  1.41*  --  2.19*   WBC  --   --   --   --  8.9  --  10.2   HGB  --   --   --  8.6* 7.8*  7.8*   < > 7.2*   PLT  --   --   --   --  234  --  328    < > = values in this interval not displayed.       Personally reviewed  current labs       Vania De Paz PA-C  [facility]  [phone]

## 2024-09-01 NOTE — PROGRESS NOTES
[facility] Medicine PROGRESS NOTE      Identification/Summary:   Ijeoma Polk is a 74 year old male admitted on 8/28/2024. 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, atrial fibrillation on warfarin, anemia of chronic disease, diabetes mellitus type 2, recent fall and found with closed bicondylar fracture of distal end of left humerus and closed nondisplaced transverse fracture of left patella, who was recently at [place] [facility] (after he sustained a mechanical fall and was found with rib fractures and sternal fracture, pulmonary nodules, rectal thickening with planned outpatient follow-up, and required OR for thrombectomy of left AV fistula, atrial fibrillation, documented history of drug-induced hemorrhagic disorder, who comes from outpatient  Nephrology clinic where he was noted to be hypotensive and somnolent. He did have half of his usual dialysis run on Monday.)     In the ER, he was hypotensive as well as tachycardic, met SIRS criteria with concern for sepsis with potential infection pressure wounds on limbs and in the back, given broad-spectrum antibiotics cefepime and vancomycin, and admitted.  Patient was also found with atrial fibrillation with RVR, given metoprolol pushes, eventually amiodarone drip. Needed vasopressors, central line.     CT performed due to unclear source of sepsis. Notable for large right pleural effusion, extensive endobronchial opacities, likely GERD with esophageal fluid. Had thoracentesis on morning of 8/30. Fluid exudative.     Still trying to control HR and keep pressure up.  Transitioning off of Levophed and amiodarone drips.    Assessment and Plan:  Possible septic shock  Acute on chronic anemia  Large rt pleural effusion, tapped on 8/30, exudative, ngtd  Malnutrition  Pressure wounds-coccyx and T spine  Reflux, possible aspiration  infiltrates, fluid-filled esophagus and endobronchial opacities on CT  On midodrine, weaning from pressors and steroids  On cefepime, vanco  Appreciate critical care mgmt  SLP consult regarding aspiration risk  NJ for feeding, nutrition consult  Protonix for GERD    Recent fall with sternal fracture, rib fractures, left humerus, left patella fracture  Recent small volume subarachnoid hemorrhage  Schedule tylenol, oxycodone prn   Holding robaxin  Denies pain unless being moved  Underwent ORIF humerus and patella on 8/12  Appreciate ortho help  Nonweightbearing on the left upper and left lower extremities    A fib with RVR, wide-complex tachycardia  Essential htn  Elevated troponin  Supratherapeutic INR, reversed with vit k  Heart failure with reduced ejection fraction  As outpt is on metop 50, warfarin  Switching to oral amiodarone  Back on low dose metoprolol orally  Appreciate cardiology mgmt  Echo with global hypokinesis, EF around 40, mild MR, AI.  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 Breathing is fine. No n/v/d.  Says that he does not really know how he feels this morning but denies any pain.    Physical Exam/Objective:  Gen: no acute distress, comfortable, alert, interactive, very pleasant  but a little bit confused this morning  ENT: no scleral icterus  Pulm: lungs are clear anteriorly, breathing comfortably at rest  CV: irregular, tachy, severe bilateral lower ext edema, feet are warm and well-perfused today  Derm: lots of bruising, pale, frail skin  Psych: appropriate affect    Medications:   Current Facility-Administered Medications   Medication Dose Route Frequency Provider Last Rate Last Admin    acetaminophen (TYLENOL) oral liquid 1,000 mg  1,000 mg Oral TID Buffy Morillo MD   1,000 mg at 08/31/24 2140    aspirin EC tablet 81 mg  81 mg Oral BID Jose Hamilton MD   81 mg at 08/31/24 2141    atorvastatin (LIPITOR) tablet 10 mg  10 mg Oral At Bedtime Jose Hamilton MD   10 mg at 08/31/24 2140    ceFEPIme (MAXIPIME) 1 g vial to attach to  mL bag for ADULTS or NS 50 mL bag for PEDS  1 g Intravenous Q24H Hardeep Major DO   1 g at 08/31/24 1812    collagenase (SANTYL) ointment   Topical Daily Jose Hamilton MD   Given at 08/31/24 1648    hydrocortisone sodium succinate PF (solu-CORTEF) injection 50 mg  50 mg Intravenous Q6H Mariah Liz MD   50 mg at 09/01/24 0059    levothyroxine (SYNTHROID/LEVOTHROID) tablet 50 mcg  50 mcg Oral QAM AC Jose Hamilton MD   50 mcg at 08/31/24 0818    magnesium oxide (MAG-OX) tablet 400 mg  400 mg Oral Q4H Hardeep Major DO        [Held by provider] methocarbamol (ROBAXIN) tablet 750 mg  750 mg Oral TID Jose Hamilton MD   750 mg at 08/28/24 1942    metoprolol tartrate (LOPRESSOR) tablet 25 mg  25 mg Oral or Feeding Tube BID David Donis MD   25 mg at 08/31/24 2140    midodrine (PROAMATINE) tablet 10 mg  10 mg Oral TID w/meals Idania Crenshaw MD   10 mg at 08/31/24 1755    oxyBUTYnin (DITROPAN) tablet 5 mg  5 mg Oral QAM Jose Hamilton MD   5 mg at 08/31/24 0817    pantoprazole (PROTONIX) EC tablet 40 mg  40 mg Oral BID AC Hardeep Major DO   40 mg at 08/31/24 1531    polyethylene glycol (MIRALAX) Packet 17 g  17 g Oral Daily Jose Hamilton MD   17 g  at 08/31/24 0819    potassium chloride (KAYCIEL) solution 10 mEq  10 mEq Oral or Feeding Tube Once Hardeep Major DO        sodium chloride (PF) 0.9% PF flush 3 mL  3 mL Intracatheter Q8H Hardeep Major DO   3 mL at 08/31/24 2141    sodium chloride (PF) 0.9% PF flush 3 mL  3 mL Intracatheter Q8H Jose Hamilton MD   3 mL at 09/01/24 0059    sodium chloride (PF) 0.9% PF flush 9 mL  9 mL Intracatheter During Dialysis/CRRT (from stock) Vania De Paz PA-C        sodium chloride (PF) 0.9% PF flush 9 mL  9 mL Intracatheter During Dialysis/CRRT (from stock) Vania De Paz PA-C        urea (GORMEL) 20 % cream CREA   Topical Daily Jose Hamilton MD   Given at 08/31/24 1648    vancomycin place maxwell - receiving intermittent dosing  1 each Intravenous See Admin Instructions Hardeep Major DO        Warfarin Dose Required Daily - Pharmacist Managed  1 each Oral See Admin Instructions Mariah Liz MD        wound support modular (EXPEDITE) bottle 60 mL  60 mL Oral Daily Orlando Villasenor, RD   60 mL at 08/31/24 1318     Clinically Significant Risk Factors              # Hypoalbuminemia: Lowest albumin = 2.5 g/dL at 8/30/2024  5:19 AM, will monitor as appropriate       # Hypertension: Noted on problem list  # Chronic heart failure with reduced ejection fraction: last echo with EF <40%          # Overweight: Estimated body mass index is 26.31 kg/m  as calculated from the following:    Height as of 8/27/24: 1.829 m (6').    Weight as of this encounter: 88 kg (194 lb 0.1 oz)., PRESENT ON ADMISSION  # Severe Malnutrition: based on nutrition assessment, PRESENT ON ADMISSION   # Financial/Environmental Concerns: none                  Hardeep Major DO   [facility]

## 2024-09-01 NOTE — PLAN OF CARE
Patient disoriented to time, place and situations. Denies chest pain or shortness of breath. LS diminished, RA O2 Sat 97%.   Discomfort in left arm and leg with turns. Pain controlled with available scheduled Tylenol.   Initially on Amiodarone and Levophed, discontinued per provider. BP soft at times otherwise VSS and MAP >65. On Tele A-Fib HR controlled with PO medication. Bruises throughout the body.   Left knee remained immobilized. Plan dialysis this afternoon to remove fluid gently and avoid hypotension. Will monitor and continue plan of care.           Problem: Sepsis/Septic Shock  Goal: Blood Glucose Level Within Targeted Range  Outcome: Progressing     Problem: Dysrhythmia  Goal: Normalized Cardiac Rhythm  Outcome: Progressing     Problem: Wound  Goal: Skin Health and Integrity  Outcome: Progressing     Problem: Adult Inpatient Plan of Care  Goal: Absence of Hospital-Acquired Illness or Injury  Intervention: Identify and Manage Fall Risk  Recent Flowsheet Documentation  Taken 9/1/2024 0800 by Anne-Marie Cardona, RN  Safety Promotion/Fall Prevention:   safety round/check completed   room door open   nonskid shoes/slippers when out of bed   mobility aid in reach   lighting adjusted   increase visualization of patient   increased rounding and observation   clutter free environment maintained

## 2024-09-01 NOTE — PROGRESS NOTES
ICU PROGRESS NOTE:    Patient Summary:  Ijeoma Polk is a 74 year old gentleman with a past medical history significant for gastric bypass, short gut syndrome, chronic diarrhea, end-stage renal disease who was previously dialyzing via left upper extremity AV fistula now through a right tunneled subclavian catheter following thrombosis of graft after recent fall, hypertension, paroxysmal atrial fibrillation on anticoagulation, type 2 diabetes, presenting to the hospital for septic shock of unclear etiology.    Lines/Drains/Tubes:  CVC Triple Lumen Right Femoral (Active)   Site Assessment WDL 09/01/24 0500   Dressing Chlorhexidine disk;Transparent 09/01/24 0500   Dressing Status clean;dry;intact 09/01/24 0500   Dressing Intervention dressing changed 09/01/24 0500   Line Necessity Yes, meets criteria 09/01/24 0000   Blue - Status saline locked 09/01/24 0000   Blue - Intervention Flushed;Cap change 08/31/24 0400   Brown - Status infusing 09/01/24 0000   Brown - Intervention Flushed 08/31/24 0800   White - Status infusing 09/01/24 0000   White - Intervention Flushed 08/30/24 0800   Phlebitis Scale 0-->no symptoms 09/01/24 0000   Infiltration? no 09/01/24 0000   Number of days: 3       Hemodialysis Vascular Access Right Subclavian (Active)   Site Assessment [facility] 09/01/24 0500   Dressing Intervention Dressing changed 08/29/24 1438   Dressing Status Clean, dry, intact 09/01/24 0500   Hand Off Report Yes 08/31/24 1600   Number of days:      Overnight events:  Continues to require vasopressors and amiodarone.    Subjective:  Generalized pain.    Objective:  Physical Exam:  Vent settings for last 24 hours:  Resp: 26    /57 (BP Location: Left arm)   Pulse (!) 128   Temp 98.3  F (36.8  C) (Oral)   Resp 26   Wt 88 kg (194 lb 0.1 oz)   SpO2 100%   BMI 26.31 kg/m      Intake/Output last 3 shifts:  I/O last 3 completed shifts:  In: 1081.12 [P.O.:750; I.V.:331.12]  Out: 3254 [Urine:250; Other:3004]  Intake/Output this  shift:  No intake/output data recorded.    Physical Exam  Constitutional:       Appearance: Normal appearance.   HENT:      Head: Normocephalic.      Comments: Ecchymosis noted on face.     Nose: Nose normal.   Cardiovascular:      Heart sounds: Normal heart sounds.      Comments: In and out of A-Fib.  Pulmonary:      Comments: Diminished BS BL.  Abdominal:      General: Abdomen is flat.   Skin:     Comments: Bed sores noted on thoracic spine and in sacrum.   Neurological:      Mental Status: He is alert.           LAB:   Latest Reference Range & Units 08/30/24 11:51   Cell Count Fluid Source  Pleural Cavity, Right   Total Nucleated Cells /uL 4,817   % Neutrophils Fluid % 88   % Lymphocytes Fluid % 5   % Mono/Macro Fluid % 1   % Lining Cells % 4   % Other Cells Fluid % 2   Color Fluid Colorless, Yellow  Red !   Appearance Fluid Clear  Slightly Bloody !   Glucose Fluid Source  Pleural Cavity, Right   Glucose Fluid mg/dL 182   LD Fluid Source  Pleural Cavity, Right   Lactate Dehydrogenase Fluid U/L 811   pH Fluid Source  Pleural Cavity, Right   Ph Fluid pH 8.0   Protein Fluid Source  Pleural Cavity, Right   Protein Total Fluid g/dL 1.8   !: Data is abnormal  Recent Labs   Lab 08/30/24  1206 08/30/24  0519   WBC  --  8.9   HGB 8.6* 7.8*  7.8*   HCT  --  22.9*   PLT  --  234     Recent Labs   Lab 08/30/24  0519 08/29/24  1257 08/29/24  0606 08/28/24  1927 08/28/24  1231   * 135 131*   < > 133*  132*   CO2 25 25 26   < > 21*  21*   BUN 24.4* 38.9* 38.0*   < > 36.0*  34.7*   ALKPHOS 104 147  --   --  158*   ALT 14 11  --   --  21   AST 20 21  --   --   --     < > = values in this interval not displayed.       RADIOLOGY:  MICRO:  Date Source Organism   8/28 Blood NGTD      Organism Antibiotic Antibiotic Start Date Antibiotic End Date   NGTD Cefepime 8/28 Ongoing     Vancomycin 8/28 Ongoing         IMAGING:  CT CAP 8/29/24:  1.  Large right pleural effusion and moderate left pleural effusion with bilateral lower  lobe, middle lobe, and lingular consolidation, which could represent compressive atelectasis or pneumonia.  2.  Extensive endobronchial opacities in the right lower lobe, left lower lobe, and middle lobe, which could be from aspiration, mucous plugging, or retained secretions.  3.  Increased fluid in the esophagus suggesting gastroesophageal reflux.  4.  Increased fluid in the colon, which is a nonspecific marker of diarrhea.    (Unchanged from previous visit)  XR Pelvis 8/29/24:  Right femoral catheter terminates in the right hemipelvis. No acute fracture or dislocation. Mild degenerative narrowing of bilateral hip joints. Significant bilateral iliofemoral arterial atherosclerotic calcification.     CXR 8/28/24:  There is a tunneled dual lumen right IJ catheter with the tip over the right atrium. There are pleural effusions with atelectasis. Cannot assess for underlying airspace disease. No pneumothorax. The heart is not well assessed.      CT Head w/o Contrast 8/28/24:  1.  No acute intracranial process.  2.  Chronic small vessel ischemic disease and generalized cerebral atrophy.  3.  Encephalomalacia in the left frontal and left occipital lobes.        Assessment/Plan:  Ijeoma Polk is a 74 year old gentleman with a past medical history significant for gastric bypass, short gut syndrome, chronic diarrhea, end-stage renal disease who was previously dialyzing via left upper extremity AV fistula now through a right tunneled subclavian catheter following thrombosis of graft after recent fall, hypertension, paroxysmal atrial fibrillation on anticoagulation, type 2 diabetes, presenting to the hospital for septic shock likely from infected pressure ulcers.     NEURO: No acute issues presently. Is complaining of more pain over the last 24h.  Continue scheduled Tylenol for now.  As needed Oxycodone.  CVS:Has a known medical history of hypertension and paroxysmal atrial fibrillation.  He is on beta-blockers and systemic  anticoagulation for these and presented yesterday with hypotension and lactic acidosis likely from septic shock.  He has previously been noted to raise concerns for adrenal insufficiency and had a spot cortisol checked which was noted to be within normal limits.  He is typically on outpatient midodrine for low blood pressures.  Was noted to have runs of A-fib with RVR while in the emergency department and was administered metoprolol x 2 and switched over to an amiodarone infusion subsequently on.  He was also noted to have nonsustained runs of ventricular tachycardia which were likely secondary to his ongoing hypokalemia and have presently resolved. Amiodarone was resumed on 8/30 due to A-fib with rates >150 and concomitant drop in blood pressures. Overnight on 8/30, amiodarone infusion discontinued again due to bradycardia.  Underwent a transthoracic echocardiogram which revealed an EF of 35-40% with moderate global hypokinesis of the left ventricle and moderate dilation of the right ventricle.  This may be contributing to his ongoing hypotension.  Continue telemetry monitoring.  MAP goal of 60mmHg with SBP>90mmHg.  Vasopressors: Norepinephrine   Continue previously scheduled midodrine 3 times daily.  Stress dose steroids 25 mg Q8h.  Afib  Being treated with Amiodarone which is intermittently being held.   Was initiated on Metoprolol per cardiology for his permanent A-Fib. Likely leading to some drop in his blood pressures.  Continue coumadin.  RESP: CT CAP with BL Pleural effusions, R>L. Right chest tube with 1.8l drainage and consistent with an exudate. Repeat imaging with near clearance of right pleural space. Small left sided pleural effusion.  Right chest tube removed on 8/31.  Maintain SpO2 of >92%.  GI:Prior history of small bowel obstruction status post small bowel resection and since then short gut syndrome and chronic diarrhea.  Is chronically malnourished and presently not willing to have NG access  placed for tube feeds.  Resume p.o. diet.  Continue Protonix daily for ulcer prophylaxis.  Have talked to nutrition and will do calorie counting.  Malnutrition:    - Level of malnutrition: Moderate   5. RENAL : Has known end-stage renal disease due to glomerulonephritis and has previously been maintained on q. Monday, Wednesday and Friday hemodialysis through a left upper extremity fistula.  During his recent hospitalization, he developed a thrombus in this and underwent a thrombectomy of his fistula.  He was subsequently initiated on dialysis through a right tunneled subclavian access.  He is noted to have multiple electrolyte abnormalities most notably hypokalemia and is due to be run against a higher potassium bath. Underwent a dialysis run on 8/29. Will touch base with nephrology about whether or not his AVF can be used so we are able to remove his tunneled access.  Would trend renal function daily and closely follow I/O.  Follow electrolytes and correct as abnormalities arise.  ID:Presents with leukocytosis and septic shock likely secondary to infected wounds in his back.  MRSA noted to be negative.  Follow Blood cultures.  Antibiotics: Continue cefepime and discontinue vancomycin.  Have consulted orthopedics to assist in evaluation of his recent ORIF and make recommendations as needed.  HEMATOLOGIC:Noted to have acute onset of anemia and elevated INR. Received a total of 5m of Vitamin K and was administered 1 unit PRBC for Hb<7. INR now subtherapeutic, has been resumed on coumadin.  Daily CBC.  Continue coumadin.  ORTHO: Noted to have left humeral and left patellar fractures s/p ORIF. Is non-weight bearing per ortho notes from [facility].  Seen by orthopedics on 8/31 and left upper extremity splint was removed by them.  Okay to start working on range of motion.  Will request them to also assess left lower extremity.  ENDOCRINE:Known medical history of DM and presented with significant hypoglycemia despite  ampules of D50 being administered.  He is also noted to be hypothyroid.  Will hold off on sliding scale insulin given minimal PO intake and blood sugars persistently below 180mg/dl.  Accu-Cheks 4 times daily and with meals.  Continue outpatient levothyroxine.  ICU PROPHYLAXIS:Protonix, coumadin.  DISPO:Unclear. Continues to require ICU levels of care.         Total Critical Care Time : 40 minutes.    Mariah Liz MD  Pulmonary and Critical Care  [facility]

## 2024-09-01 NOTE — PROGRESS NOTES
09/01/24 1100   Appointment Info   Signing Clinician's Name / Credentials (PT) WENDY Holt   Student Supervision Direct supervision provided;Therapy services provided with the co-signing licensed therapist guiding and directing the services, and providing the skilled judgement and assessment throughout the session   Living Environment   Current Living Arrangements other (see comments)  (TCU)   Self-Care   Usual Activity Tolerance fair   Current Activity Tolerance poor   Equipment Currently Used at Home none  (Was not able to walk at TCU. Stated he did not use equipment before hand)   Fall history within last six months yes   Number of times patient has fallen within last six months 1   General Information   Onset of Illness/Injury or Date of Surgery 08/28/24   Referring Physician Dr. Hardeep Major   Patient/Family Therapy Goals Statement (PT) Return to TCU   Pertinent History of Current Problem (include personal factors and/or comorbidities that impact the POC) Multiple fractures s/p fall; generalized weakness   Existing Precautions/Restrictions weight bearing;fall   Weight-Bearing Status - LLE nonweight-bearing   Weight-Bearing Status - RLE full weight-bearing   Bed Mobility   Bed Mobility sidelying-sit   Sidelying-Sit Richardson (Bed Mobility) moderate assist (50% patient effort);2 person assist;verbal cues   Bed Mobility Limitations decreased ability to use arms for pushing/pulling;decreased ability to use legs for bridging/pushing;impaired ability to control trunk for mobility   Impairments Contributing to Impaired Bed Mobility pain;decreased ROM;impaired postural control;decreased strength   Assistive Device (Bed Mobility) bed rails;draw sheet   Clinical Impression   Criteria for Skilled Therapeutic Intervention Yes, treatment indicated   PT Diagnosis (PT) impaired functional mobility   Influenced by the following impairments pain, weakness, balance, postural control, ROM limitations   Functional  limitations due to impairments bed mobility, transfers, gait   Clinical Presentation (PT Evaluation Complexity) stable   Clinical Presentation Rationale Pt presents as medically diagnosed   Clinical Decision Making (Complexity) moderate complexity   Planned Therapy Interventions (PT) bed mobility training;strengthening;patient/family education;ROM (range of motion);transfer training   Risk & Benefits of therapy have been explained evaluation/treatment results reviewed;care plan/treatment goals reviewed;patient   PT Total Evaluation Time   PT Eval, Moderate Complexity Minutes (84956) 10   Physical Therapy Goals   PT Frequency 5x/week   PT Predicted Duration/Target Date for Goal Attainment 09/06/24   PT Goals Bed Mobility;Transfers   PT: Bed Mobility Minimal assist;Supine to/from sit;Within precautions   PT: Transfers Minimal assist;Sit to/from stand;Assistive device;Within precautions   Interventions   Interventions Quick Adds Therapeutic Activity;Therapeutic Procedure   Therapeutic Procedure/Exercise   Ther. Procedure: strength, endurance, ROM, flexibillity Minutes (64174) 8   Symptoms Noted During/After Treatment increased pain   Treatment Detail/Skilled Intervention Pt performed R LE exercises while seated EOB. Min-mod A of 1 to maintain seated posture and SBA for exercises. Marches, LAQ, ankle pumps, and hamstring curls x10 reps. VC's, TC's, and demo for exercise technique. Max A for ankle df.   Therapeutic Activity   Therapeutic Activities: dynamic activities to improve functional performance Minutes (14490) 15   Symptoms Noted During/After Treatment Fatigue;Increased pain   Treatment Detail/Skilled Intervention supine to sit w/mod A of 2 and use of bed rails and drawsheet. VC's to shift legs towards EOB and to press into the bed. Pt scooted to EOB w/drawsheet, dependent. Pt maintained EOB sitting balance for 12 minutes w/min to mod A of 1 to maintain postural control. VC's to place hands on bed to hold self up.  Pt performed seated LE exercises and was given a bath while seated EOB. Mod A of 3 for sit to supine to maintain precautions. Pt rolled to L side x2 and R side x1 w/Mod A of 3 while new sheets were placed. Pt scooted HOB w/drawsheet, dependent.   PT Discharge Planning   PT Plan bed mobility, strengthening, sitting balance   PT Discharge Recommendation (DC Rec) Transitional Care Facility   PT Rationale for DC Rec Pt was min-mod assist for all bed mobility and cannot tolerate standing or ambulating. Continued therapy recommneded for balance, bed mobility, strengthening, and transfers.   PT Brief overview of current status Pt sat EOB w/min-mod A of 1 for 12 minutes   PT Equipment Needed at Discharge wheelchair;walker, rolling   Total Session Time   Timed Code Treatment Minutes 23   Total Session Time (sum of timed and untimed services) 33

## 2024-09-01 NOTE — PROGRESS NOTES
CLINICAL NUTRITION SERVICES - REASSESSMENT NOTE     Nutrition Prescription    RECOMMENDATIONS FOR MDs/PROVIDERS TO ORDER:  Recommend TF to help pt meet estimated needs, wound healing.   No RD on site 9/2: use TF regimen below if pt has NG placed and TF is ordered. Adjust FWF pending fluid status    Malnutrition Status:    Severe malnutrition  In Context of:  Acute illness or injury  Chronic illness or disease    Recommendations already ordered by Registered Dietitian (RD):  Enteral nutrition recommendations:  Prior to feeds starting:  -Obtain and verify enteral access.  -Obtain baseline K+, Mag and Phos labs and ensure they are WNL.   -Patient is hemodynamically stable.    Once the above are met, recommend the following TF regimen as below:    1) Novasource Renal via NG at 15 mL/hr x 12 hrs + 1 pkt Prosource TF20. If K+ and Mag are WNL and Phos >/= 2.0, advance by 15 mL per hr q 8 to goal of 45 mL/hr x 24 hrs.   -Do not advance if K+ and Mag are below normal and Phos <2.0. Hold at current rate and replace electrolytes. When K+ and Mag are WNL and Phos  >/= 2.0 may advance as above. At goal rate, formula provides total volume of 1080 mL, 2240 kcal (29 kcal/kg, 100% needs), 118 gm Pro (1.5 gm/kg, 100% needs), 197 gm CHO, 108 gm fat, 0 gm fiber, and 774 mL free water per DW of 76.2 kg.    2) Free water flushes of 30 mL q 4 hrs. Total fluid (free water + flushes) = 954 mL per day.    3) HOB should be elevated at least 30-40 degrees when supine.    4) If plan is to prone, hold feeds x 1 hr prior to turning from supine to prone, and from prone to supine.    Future/Additional Recommendations:  Monitor po intake, labs, wts, need for nutrition support     EVALUATION OF THE PROGRESS TOWARD GOALS   Diet: Renal  Nutrition Support: Audible Magic 1.0 BID, Expedite daily  Intake (based on calorie counts 8/30-9/1):  8/30: 1.5 yogurts, 1 Expedite = 227 kcal, 25 g protein  8/31: 1 Expedite, 0.5 cup potato soup = 226 kcal, 12 g  protein  Pt meeting <25% estimated nutrition needs     NEW FINDINGS   Pt has declined offer of TF by MD, but plan is to continue trying and encouraging the idea with pt.     Labs:  Reviewed.  BUN: 25.1 (H)  Cr: 2.44 (H)  B-205 (H) past 24 hours    Meds:  Synthroid  Mag oxide  Protonix  Miralax  Expedite  Albumin injection PRN    Skin:  Edema: 3+ moderate in dependent, BLE ankles, feet. 2+ mild in generalized, BUE arms, wrists, L hand, L hip, back, BLE legs    Anthropometrics:   24 : 88 kg (194 lb 0.1 oz) - likely up from fluid wt  24 : 76.2 kg (168 lb)  24 : 80.3 kg (177 lb)  24 : 80.3 kg (177 lb)  22 : 80.7 kg (177 lb 14.4 oz)  22 : 81 kg (178 lb 9.2 oz)    Previous Goals   - Total avg nutritional intake to meet a minimum of 25 kcal/kg and 1.5 g PRO/kg daily (per dosing wt 76.2 kg). - not met  - Maintain wt >76.2 - unable to assess  - Electrolytes WNL - met    CURRENT NUTRITION DIAGNOSIS  Malnutrition related to poor appetite, altered GI function as evidenced by >5% wt loss in 1 month, <75% intake for >1 month, fat and muscle losses, moderate edema   Evaluation: no change    INTERVENTIONS  Implementation  Enteral Nutrition    Goals  - Total avg nutritional intake to meet a minimum of 25 kcal/kg and 1.5 g PRO/kg daily (per dosing wt 76.2 kg). -   - Maintain wt >76.2 -   - Electrolytes WNL -    Monitoring/Evaluation  Progress toward goals will be monitored and evaluated per protocol.

## 2024-09-01 NOTE — PLAN OF CARE
Surgical bandages removed by orthopedics.  Patient refuses ace wrap or sling at this time, both are in patient room for use when patient ready.

## 2024-09-01 NOTE — PROGRESS NOTES
"Speech-Language Pathology: Clinical Swallow Evaluation     09/01/24 1200   Appointment Info   Signing Clinician's Name / Credentials (SLP) Jenni Richardson MA, CCC-SLP   General Information   Onset of Illness/Injury or Date of Surgery 08/28/24   Referring Physician Hardeep Major, DO   Pertinent History of Current Problem Per ordering provider \"Ijeoma Polk is a 74 year old male admitted on 8/28/2024. 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, atrial fibrillation on warfarin, anemia of chronic disease, diabetes mellitus type 2, recent fall and found with closed bicondylar fracture of distal end of left humerus and closed nondisplaced transverse fracture of left patella, who was recently at [facility] (after he sustained a mechanical fall and was found with rib fractures and sternal fracture, pulmonary nodules, rectal thickening with planned outpatient follow-up, and required OR for thrombectomy of left AV fistula, atrial fibrillation, documented history of drug-induced hemorrhagic disorder, who comes from outpatient  Nephrology clinic where he was noted to be hypotensive and somnolent. He did have half of his usual dialysis run on Monday.)     In the ER, he was hypotensive as well as tachycardic, met SIRS criteria with concern for sepsis with potential infection pressure wounds on limbs and in the back, given broad-spectrum antibiotics cefepime and vancomycin, and admitted.  Patient was also found with atrial fibrillation with RVR, given metoprolol pushes, eventually amiodarone drip. Needed vasopressors, central line.      CT performed due to unclear source of sepsis. Notable for large right pleural effusion, extensive endobronchial opacities, likely GERD with esophageal fluid. Had thoracentesis on morning of 8/30. Fluid exudative.      Still trying to control HR and keep pressure up. " " Transitioning off of Levophed and amiodarone drips.\"   General Observations awake and cooperative   Type of Evaluation   Type of Evaluation Swallow Evaluation   Oral Motor   Oral Musculature generally intact   Mucosal Quality dry  (improved with water)   Dentition (Oral Motor)   Comment, Dentition (Oral Motor) Pt declined dentures d/t oral pain   Dentition (Oral Motor) edentulous;dental appliance/dentures   Dental Appliance/Denture (Oral Motor) not present during evaluation   Facial Symmetry (Oral Motor)   Facial Symmetry (Oral Motor) WNL   Lip Function (Oral Motor)   Lip Range of Motion (Oral Motor) WNL   Lip Strength (Oral Motor) WNL   Tongue Function (Oral Motor)   Tongue Strength (Oral Motor) WFL   Tongue Coordination/Speed (Oral Motor) reduced rate   Tongue ROM (Oral Motor) WNL   Jaw Function (Oral Motor)   Jaw Function (Oral Motor) WNL   Cough/Swallow/Gag Reflex (Oral Motor)   Volitional Throat Clear/Cough (Oral Motor) WNL   Volitional Swallow (Oral Motor) WNL   Vocal Quality/Secretion Management (Oral Motor)   Vocal Quality (Oral Motor) WFL   Secretion Management (Oral Motor) WNL   General Swallowing Observations   Past History of Dysphagia Pt was seen 8/10/24 by SLP following a concussion. He was not seen for swallow. His RN reports no s/s aspiration. The pt reports no concerns with swallow but has been choosing pureed items d/t oral pain and not wearing dentures.   Current Diet/Method of Nutritional Intake (General Swallowing Observations, NIS) regular diet;thin liquids (level 0)  (Pt has been choosing pureed items)   Swallowing Evaluation Clinical swallow evaluation   Clinical Swallow Evaluation   Clinical Swallow Evaluation Textures Trialed thin liquids  (Pt declined puree and soft solid trials)   Clinical Swallow Eval: Thin Liquid Texture Trial   Mode of Presentation, Thin Liquids cup;straw   Volume of Liquid or Food Presented 5oz   Oral Phase of Swallow WFL   Pharyngeal Phase of Swallow intact "   Diagnostic Statement No overt s/s aspiration   Swallowing Recommendations   Diet Consistency Recommendations pureed (level 4);thin liquids (level 0)  (Okay to advance solids per patient preference as oral pain improves and he is able to wear dentures.)   Supervision Level for Intake 1:1 supervision needed   Mode of Delivery Recommendations bolus size, small;slow rate of intake   Monitoring/Assistance Required (Eating/Swallowing) stop eating activities when fatigue is present   Recommended Feeding/Eating Techniques (Swallow Eval) maintain upright sitting position for eating;provide assist with feeding   Medication Administration Recommendations, Swallowing (SLP) One at a time, patient preference   Instrumental Assessment Recommendations instrumental evaluation not recommended at this time  (May be warranted pending progression)   General Therapy Interventions   Planned Therapy Interventions Dysphagia Treatment   Dysphagia treatment Instruction of safe swallow strategies;Compensatory strategies for swallowing;Modified diet education   Clinical Impression   Criteria for Skilled Therapeutic Interventions Met (SLP Eval) Yes, treatment indicated   Risks & Benefits of therapy have been explained evaluation/treatment results reviewed;care plan/treatment goals reviewed;participants included;patient;participants voiced agreement with care plan   Clinical Impression Comments Clinical Swallow Evaluation completed. Patient had no s/s aspiration with thin liquids. Oral motor function was WFL for thin liquids. Mastication was not assessed d/t pt declining solid or puree trials. He has not been wearing dentures d/t oral pain and would like his food pureed at this time. Hyolaryngeal elevation appears WFL upon visualization and palpation. SLP to follow for ongoing dysphagia management.   SLP Total Evaluation Time   Eval: oral/pharyngeal swallow function, clinical swallow Minutes ([id number]) 20   SLP Goals   Therapy Frequency (SLP Eval)  3 times/week   SLP Predicted Duration/Target Date for Goal Attainment 09/08/24   SLP Goals Swallow   SLP Discharge Planning   SLP Brief overview of current status  Recommend Puree and Thin. Pt must be alert and upright for intake. 1:1 assist. Okay to advance solids per patient preference as oral pain improves and he is able to wear dentures.

## 2024-09-01 NOTE — CONSULTS
[facility] Orthopedic Consultation    Ijeoma Polk MRN# [id number]   Age: 74 year old YOB: 1950     Date of Admission:  8/28/2024    Reason for consult: Left elbow distal humerus fracture, status post ORIF on 8/12/2024.       Requesting physician: Dr. Liz       Level of consult: Consult, follow and place orders           Assessment and Plan:   Assessment:   Left distal humerus fracture, status post ORIF on 8/12/2024.      Plan:   I had a discussion today with the patient about his treatment plan and treatment options.  We will discontinue use of the splint.  He will use a sling.  The incision should be covered with a dry dressing and an Ace bandage.  He may start working on range of motion for the left elbow.  The patient should start working with therapy for his left elbow motion.  He will still be nonweightbearing on the left upper extremity.  He may start using his left hand for light activities.  We will leave his sutures until next week.  The patient may bathe the left upper extremity normally.            Chief Complaint:   Status post ORIF left elbow distal humerus fracture with olecranon osteotomy.       History is obtained from the patient         History of Present Illness:   This patient is a 74 year old male who presents with the following condition requiring a hospital admission:                 Past Medical History:   I have reviewed and updated this patient's past medical history          Past Surgical History:   I have reviewed this patient's past surgical history          Social History:   I have reviewed this patient's social history          Family History:   I have reviewed this patient's family history          Immunizations:   Immunizations are current          Allergies:   All allergies reviewed and addressed          Medications:     Current Facility-Administered Medications   Medication Dose Route Frequency Provider Last Rate Last Admin     acetaminophen (TYLENOL) oral liquid 1,000 mg  1,000 mg Oral TID Buffy Morillo MD   1,000 mg at 08/31/24 1527    albumin human 25 % injection 50 mL  50 mL Intravenous Q1H PRN Vania De Paz PA-C   50 mL at 08/31/24 1334    albumin human 5 % injection  mL   mL Intravenous Q1H PRN Vania De Paz PA-C        alteplase (CATHFLO ACTIVASE) injection 2 mg  2 mg Intracatheter Q1H PRN Vania De Paz PA-C        alteplase (CATHFLO ACTIVASE) injection 2 mg  2 mg Intracatheter Q1H PRN Vania De Paz PA-C        amiodarone (NEXTERONE) 1.8 mg/mL in dextrose 5% 200 mL ADULT STANDARD infusion  0.5 mg/min Intravenous Continuous Latanya Auguste MD 16.7 mL/hr at 08/31/24 1824 0.5 mg/min at 08/31/24 1824    artificial saliva (BIOTENE MT) solution 1 spray  1 spray Mouth/Throat 4x Daily PRN Jose Hamilton MD        aspirin EC tablet 81 mg  81 mg Oral BID Jose Hamilton MD   81 mg at 08/31/24 0818    atorvastatin (LIPITOR) tablet 10 mg  10 mg Oral At Bedtime Jose Hamilton MD   10 mg at 08/30/24 2233    benzocaine-menthol (CEPACOL) 15-3.6 MG lozenge 1 lozenge  1 lozenge Buccal Q1H PRN Jose Hamilton MD        calcium carbonate (TUMS) chewable tablet 1,000 mg  1,000 mg Oral 4x Daily PRN Jose Hamilton MD        ceFEPIme (MAXIPIME) 1 g vial to attach to  mL bag for ADULTS or NS 50 mL bag for PEDS  1 g Intravenous Q24H Hardeep Major DO   1 g at 08/31/24 1812    collagenase (SANTYL) ointment   Topical Daily Jose Hamilton MD   Given at 08/31/24 1648    hydrocortisone sodium succinate PF (solu-CORTEF) injection 50 mg  50 mg Intravenous Q6H Mariah Liz MD   50 mg at 08/31/24 1952    levothyroxine (SYNTHROID/LEVOTHROID) tablet 50 mcg  50 mcg Oral QAM AC Jose Hamilton MD   50 mcg at 08/31/24 0818    lidocaine (LMX4) cream   Topical Q1H PRN Jose Hamilton MD        lidocaine (LMX4) cream   Topical Q1H PRN Julianne Sahu MD        lidocaine 1 % 0.1-1 mL  0.1-1 mL Other Q1H PRN Jose Hamilton MD         lidocaine 1 % 0.1-5 mL  0.1-5 mL Other Q1H PRN Julianne Sahu MD        lidocaine-prilocaine (EMLA) cream   Topical Daily PRN Jose Hamilton MD        melatonin tablet 1 mg  1 mg Oral At Bedtime PRN Jose Hamilton MD        [Held by provider] methocarbamol (ROBAXIN) tablet 750 mg  750 mg Oral TID Jose Hamilton MD   750 mg at 08/28/24 1942    metoprolol (LOPRESSOR) injection 5 mg  5 mg Intravenous Q5 Min PRN David Donis MD        metoprolol tartrate (LOPRESSOR) tablet 25 mg  25 mg Oral or Feeding Tube BID David Donis MD   25 mg at 08/31/24 1318    midodrine (PROAMATINE) tablet 10 mg  10 mg Oral TID w/meals Idania Crenshaw MD   10 mg at 08/31/24 1755    naloxone (NARCAN) injection 0.2 mg  0.2 mg Intravenous Q2 Min PRN Hardeep Major DO        Or    naloxone (NARCAN) injection 0.4 mg  0.4 mg Intravenous Q2 Min PRN Hardeep Major DO        Or    naloxone (NARCAN) injection 0.2 mg  0.2 mg Intramuscular Q2 Min PRN Hardeep Major DO        Or    naloxone (NARCAN) injection 0.4 mg  0.4 mg Intramuscular Q2 Min PRN Hardeep Major DO        norepinephrine (LEVOPHED) 16 mg in sodium chloride 0.9 % 250 mL infusion CENTRAL  0.01-0.6 mcg/kg/min Intravenous Continuous ShalondaMariah townsend MD 4.3 mL/hr at 08/31/24 1800 0.06 mcg/kg/min at 08/31/24 1800    ondansetron (ZOFRAN ODT) ODT tab 4 mg  4 mg Oral Q6H PRN Jose Hamilton MD        Or    ondansetron (ZOFRAN) injection 4 mg  4 mg Intravenous Q6H PRN Jose Hamilton MD        oxyBUTYnin (DITROPAN) tablet 5 mg  5 mg Oral QAM Jose Hamilton MD   5 mg at 08/31/24 0817    oxyCODONE (ROXICODONE) tablet 5 mg  5 mg Oral Q4H PRN Hardeep Major DO        pantoprazole (PROTONIX) EC tablet 40 mg  40 mg Oral BID AC Hardeep Major DO   40 mg at 08/31/24 1531    polyethylene glycol (MIRALAX) Packet 17 g  17 g Oral Daily Jose Hamilton MD   17 g at 08/31/24 0819    prochlorperazine (COMPAZINE) injection 5 mg  5 mg Intravenous Q6H PRN Jose Hamilton MD        Or    proccliftonorperazine  (COMPAZINE) tablet 5 mg  5 mg Oral Q6H PRN Jose Hamilton MD        Or    prochlorperazine (COMPAZINE) suppository 12.5 mg  12.5 mg Rectal Q12H PRN Jose Hamilton MD        senna-docusate (SENOKOT-S/PERICOLACE) 8.6-50 MG per tablet 1 tablet  1 tablet Oral BID PRN Jose Hamilton MD        Or    senna-docusate (SENOKOT-S/PERICOLACE) 8.6-50 MG per tablet 2 tablet  2 tablet Oral BID PRN Jose Hamilton MD        sodium chloride (PF) 0.9% PF flush 10 mL  10 mL Intracatheter Q15 Min PRN Vania De Paz PA-C        sodium chloride (PF) 0.9% PF flush 10-40 mL  10-40 mL Intracatheter Once PRN Julianne Sahu MD        sodium chloride (PF) 0.9% PF flush 3 mL  3 mL Intracatheter q1 min prn Hardeep Major,         sodium chloride (PF) 0.9% PF flush 3 mL  3 mL Intracatheter Q8H Hardeep Major,    3 mL at 08/31/24 0531    sodium chloride (PF) 0.9% PF flush 3 mL  3 mL Intracatheter Q8H Jose Hamilton MD   3 mL at 08/31/24 1800    sodium chloride (PF) 0.9% PF flush 3 mL  3 mL Intracatheter q1 min prn Jose Hamilton MD        sodium chloride (PF) 0.9% PF flush 9 mL  9 mL Intracatheter During Dialysis/CRRT (from stock) Vania De Paz PA-C        sodium chloride (PF) 0.9% PF flush 9 mL  9 mL Intracatheter During Dialysis/CRRT (from stock) Vania De Paz PA-C        sodium chloride 0.9% BOLUS 100-150 mL  100-150 mL Intravenous Q15 Min PRN Vania De Paz PA-C        sodium chloride 0.9% BOLUS 100-150 mL  100-150 mL Intravenous Q15 Min PRN Vania De Paz PA-C        urea (GORMEL) 20 % cream CREA   Topical Daily Jose Hamilton MD   Given at 08/31/24 1648    vancomycin place maxwell - receiving intermittent dosing  1 each Intravenous See Admin Instructions Hardeep Major,         vasopressin (VASOSTRICT) 20 Units in sodium chloride 0.9 % 100 mL standard conc infusion  2.4 Units/hr Intravenous Continuous Hardeep Major,         Warfarin Dose Required Daily - Pharmacist Managed  1 each Oral See Admin Instructions Mariah Liz,  MD        wound support modular (EXPEDITE) bottle 60 mL  60 mL Oral Daily Orlando Villasenor, RD   60 mL at 08/31/24 1318             Review of Systems:   The Review of Systems is negative other than noted in the HPI          Physical Exam:   Vitals were reviewed  All vitals have been reviewed  The patient's left elbow has an incision posteriorly over the olecranon.  There are monofilament sutures in place.  There is no erythema or evidence of infection.  There is mild tenderness diffusely over the left elbow.  He has significant limitation of flexion and extension of the left elbow because of stiffness.  He has a normal motor and sensory examination distally.  He has brisk capillary refill distally.  He has a normal motor and sensory examination distally.          Data:   All laboratory and imaging data in the past 24 hours reviewed  X-rays of the left elbow dated 8/31/2024 are available for review.  The distal humerus fracture is well reduced.  There is a lateral plate, a medial plate, and an olecranon plate.  All the hardware is in good position.     Attestation:  I have reviewed today's vital signs, notes, medications, labs and imaging.    Agustín Bishop MD

## 2024-09-02 NOTE — PROGRESS NOTES
[facility] MEDICINE PROGRESS NOTE      Summary: 74 year old male into [facility] on 8/28/2024 for  Hypotension, lethargy.  Pt was admitted at [facility] on  8/9 after a mechanical fall with multiple fractures including left  Distal humerus and left patella.  He had ORIF on 9/12.  He  Was dischaged home then moved to [place] and transferred care.    PMHx includes ESRD with dialysis, atrial fibrillation with coumadin use,  Hypertension, diabetes mellitus type 2 noninsulin    Hospital course is marked by initial treatment on admission with  Empiric antibiotics including cefepime and vancomycin for sepsis  Without a source.  He has required pressors. Cultures are negative to date.        9/2/2024:  off levophed since yesterday; has no energy; denies pain;   Is eating at times blood pressure continues to be soft;   Ongoing issues include the low BP, consideration for   Possible infectious source; dialysis access    Hospital day number 5     Hypotension: originally given empiric for septic shock though  Without a source, off Levophed since yesterday;  Had a normal cortisol, is on outpatient midodrine, hydrocortisone   3 times daily continues  ESRD with dialysis: previous LUE fistula thrombosed; now  Access is right tunneled subclavian:  dialyzed yesterday with 3 L removed, plan to repeat today with gentle volume removed;  Patient to receive fistulogram earlier this week     Anemia of chronic disease: Hemoglobin 8  HIstory of atrial fibrillation; NSVT: amiodarone started during  This stay; changed from iv to oral; continue with  Metoprolol tartrate 25 mg twice daily ,may need ischemic eval at some point though  Is currently not stable enough;   Pleural effusion, right greater than left: CT placed on 8/28 with  1.8 liters exudative fluid; tube removed on 8/31 (due to  Pleural inflammation from blunt chest trauma);   DM2, noninsulin:    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  HTN: Metoprolol tartrate 25 mg twice daily  12.  GERD: Continue Protonix 40 mg twice daily  13.  Leukocytosis: level was 9 on 8/30; now at 17; consider ID input  14.  DVT prevention: Coumadin per pharmacy  15.  Disposition:  Medically Ready for Discharge:  3-4 days            Lexi Tracy MD  [facility]  Phone: #[phone]  Securely message with the Vocera Web Console (learn more here)  Text page via [employer] Paging/Directory     Interval History/Subjective: I am so afraid of dying;    Physical Exam/Objective:  Temp:  [97.3  F (36.3  C)-98.7  F (37.1  C)] 97.4  F (36.3  C)  Pulse:  [] 79  Resp:  [7-31] 14  BP: ()/(47-73) 87/57  SpO2:  [59 %-100 %] 98 %  Body mass index is 25.86 kg/m .    GENERAL:  Alert, chronically ill; no distress   HEAD:  Normocephalic, without obvious abnormality, atraumatic   EYES:  PERRL, conjunctiva/corneas clear, no scleral icterus, EOM's intact   NOSE: Nares normal, septum midline, mucosa normal, no drainage   THROAT: Lips, mucosa, and tongue normal; teeth and gums normal, mouth moist   NECK: Supple, symmetrical, trachea midline   BACK:   Symmetric, no curvature, ROM normal   LUNGS:   Clear to auscultation bilaterally, no rales, rhonchi, or wheezing, symmetric chest rise on inhalation, respirations unlabored   CHEST WALL:  Right chest wall dialysis access   HEART:  Regular rate and rhythm, S1 and S2 normal, no murmur, rub, or gallop    ABDOMEN:   Soft, non-tender, bowel sounds active all four quadrants, no masses, no organomegaly, no rebound or guarding   EXTREMITIES: Left upper extremity in sling left hand is swollen hands are warm   SKIN:  Bed sores on thoracic spine and on sacrum   NEURO: Alert, oriented x3, moves all four extremities freely   PSYCH: Cooperative, behavior is appropriate      Data reviewed today: I personally reviewed all new medications, labs, imaging/diagnostics reports over the past 24 hours. Pertinent findings include:    Imaging:   No results found for this or any previous visit (from the past 24 hour(s)).    Labs:    Medications:   Personally Reviewed.  Medications   Current Facility-Administered Medications   Medication Dose Route Frequency Provider Last Rate Last Admin    norepinephrine (LEVOPHED) 16 mg in sodium chloride 0.9 % 250 mL infusion CENTRAL  0.01-0.6 mcg/kg/min Intravenous Continuous Mariah Liz MD   Paused at 09/01/24 0924     Current Facility-Administered Medications   Medication Dose Route Frequency Provider Last Rate Last Admin    acetaminophen (TYLENOL) oral liquid 975 mg  975 mg Oral TID Hardeep Major DO   975 mg at 09/01/24 1817    amiodarone (PACERONE) tablet 200 mg  200 mg Oral Daily David Donis MD   200 mg at 09/01/24 1048    aspirin EC tablet 81 mg  81 mg Oral BID Jose Hamilton MD   81 mg at 09/01/24 2104    atorvastatin (LIPITOR) tablet 10 mg  10 mg Oral At Bedtime Jose Hamilton MD   10 mg at 09/01/24 2104    collagenase (SANTYL) ointment   Topical Daily Jose Hamilton MD   Given at 09/01/24 0851    hydrocortisone sodium succinate PF (solu-CORTEF) injection 25 mg  25 mg Intravenous Q8H Mariah Liz MD   25 mg at 09/02/24 0046    levothyroxine (SYNTHROID/LEVOTHROID) tablet 50 mcg  50 mcg Oral QAM AC Jose Hamilton MD   50 mcg at 09/01/24 0837    [Held by provider] methocarbamol (ROBAXIN) tablet 750 mg  750 mg Oral TID Jose Hamilton MD   750 mg at 08/28/24 1942    metoprolol tartrate (LOPRESSOR) tablet 25 mg  25 mg Oral or Feeding Tube BID David Donis MD   25 mg at 09/01/24 0837    midodrine (PROAMATINE) tablet 10 mg  10 mg Oral TID w/meals Idania Crenshaw MD   10 mg at 09/01/24  1817    oxyBUTYnin (DITROPAN) tablet 5 mg  5 mg Oral QAM Jose Hamilton MD   5 mg at 09/01/24 0837    pantoprazole (PROTONIX) EC tablet 40 mg  40 mg Oral BID AC Hardeep Major DO   40 mg at 09/01/24 1817    polyethylene glycol (MIRALAX) Packet 17 g  17 g Oral Daily Jose Hamilton MD   17 g at 09/01/24 0838    sodium chloride (PF) 0.9% PF flush 3 mL  3 mL Intracatheter Q8H Hardeep Major,    3 mL at 09/02/24 0558    sodium chloride (PF) 0.9% PF flush 3 mL  3 mL Intracatheter Q8H Jose Hamilton MD   10 mL at 09/02/24 0047    sodium chloride (PF) 0.9% PF flush 9 mL  9 mL Intracatheter During Dialysis/CRRT (from stock) Vania De Paz PA-C        sodium chloride (PF) 0.9% PF flush 9 mL  9 mL Intracatheter During Dialysis/CRRT (from stock) Vania De Paz PA-C        urea (GORMEL) 20 % cream CREA   Topical Daily Jose Hamilton MD   Given at 09/01/24 0851    Warfarin Dose Required Daily - Pharmacist Managed  1 each Oral See Admin Instructions Mariah Lzi MD        wound support modular (EXPEDITE) bottle 60 mL  60 mL Oral Daily Orlando Villasenor, RD   60 mL at 09/01/24 0909

## 2024-09-02 NOTE — PROGRESS NOTES
Cardiology Progress Note    Assessment:  Permanent atrial fibrillation with labile ventricular response rate, recent acceleration related to multiple injuries, sepsis, deconditioning  Sepsis syndrome, improved  Wide QRS  complex tachycardia, short runs, probably nonsustained VT, aberrant conduction of A-fib less likely, on p.o. amiodarone  Decreased LV systolic function likely due to systemic illness and tachycardia  End-stage renal disease on dialysis  Morbid obesity status post gastric bypass   Recent fall with multiple fractures  Diabetes mellitus  Troponin elevation, type II injury/decreased clearance  History of hypertension    Plan:  Continue current dose of p.o. metoprolol, unable to increase today due to borderline low blood pressure  Continue p.o. amiodarone    He may need to undergo ischemic evaluation after he has substantial recovery from sepsis and its complications  Not appropriate candidate for invasive cardiac evaluation at this point.    Subjective:   Denies chest pain or increasing shortness of breath    Objective:   BP 98/82   Pulse (!) 155   Temp 97.5  F (36.4  C) (Oral)   Resp 14   Wt 86.5 kg (190 lb 11.2 oz)   SpO2 98%   BMI 25.86 kg/m      Intake/Output Summary (Last 24 hours) at 8/30/2024 1013  Last data filed at 8/30/2024 0811  Gross per 24 hour   Intake 3002.09 ml   Output 300 ml   Net 2702.09 ml         Physical Exam:  GENERAL: no distress, mildly confused  LUNGS: Decreased breath sounds  CARDIAC: irregular  rhythm, S1 & S2 normal.  No heaves, thrills, gallops or murmurs.  ABDOMEN: flat, negative hepatosplenomegaly, soft and non-tender.      Current Facility-Administered Medications Ordered in Epic   Medication Dose Route Frequency Provider Last Rate Last Admin     acetaminophen (TYLENOL) oral liquid 975 mg  975 mg Oral TID Hardeep Major,    975 mg at 09/01/24 1817     albumin human 25 % injection 50 mL  50 mL Intravenous Q1H PRN Vania De Paz PA-C   50 mL at 09/01/24 1618      albumin human 5 % injection  mL   mL Intravenous Q1H PRN Vania De Paz PA-C   250 mL at 09/01/24 1522     alteplase (CATHFLO ACTIVASE) injection 2 mg  2 mg Intracatheter Q1H PRN Vania De Paz PA-C         alteplase (CATHFLO ACTIVASE) injection 2 mg  2 mg Intracatheter Q1H PRN Vania De Paz PA-C         amiodarone (PACERONE) tablet 200 mg  200 mg Oral Daily David Donis MD   200 mg at 09/02/24 0834     artificial saliva (BIOTENE MT) solution 1 spray  1 spray Mouth/Throat 4x Daily PRN Jose Hamilton MD         aspirin EC tablet 81 mg  81 mg Oral BID Jose Hamilton MD   81 mg at 09/01/24 2104     atorvastatin (LIPITOR) tablet 10 mg  10 mg Oral At Bedtime Jose Hamilton MD   10 mg at 09/01/24 2104     benzocaine-menthol (CEPACOL) 15-3.6 MG lozenge 1 lozenge  1 lozenge Buccal Q1H PRN Jose Hamilton MD         calcium carbonate (TUMS) chewable tablet 1,000 mg  1,000 mg Oral 4x Daily PRN Jose Hamilton MD         collagenase (SANTYL) ointment   Topical Daily Jose Hamilton MD   Given at 09/01/24 0851     hydrocortisone sodium succinate PF (solu-CORTEF) injection 25 mg  25 mg Intravenous Q8H Mariah Liz MD   25 mg at 09/02/24 0046     levothyroxine (SYNTHROID/LEVOTHROID) tablet 50 mcg  50 mcg Oral QAM AC Jose Hamilton MD   50 mcg at 09/01/24 0837     lidocaine (LMX4) cream   Topical Q1H PRN Jose Hamilton MD         lidocaine 1 % 0.1-1 mL  0.1-1 mL Other Q1H PRN Jose Hamilton MD         lidocaine-prilocaine (EMLA) cream   Topical Daily PRN Jose Hamilton MD         melatonin tablet 1 mg  1 mg Oral At Bedtime PRN Jose Hamilton MD         [Held by provider] methocarbamol (ROBAXIN) tablet 750 mg  750 mg Oral TID Jose Hamilton MD   750 mg at 08/28/24 1942     metoprolol (LOPRESSOR) injection 5 mg  5 mg Intravenous Q5 Min PRN David Donis MD         metoprolol tartrate (LOPRESSOR) tablet 25 mg  25 mg Oral or Feeding Tube BID David Donis MD   25 mg at 09/02/24 0860      midodrine (PROAMATINE) tablet 10 mg  10 mg Oral TID w/meals Idania Crenshaw MD   10 mg at 09/02/24 0834     naloxone (NARCAN) injection 0.2 mg  0.2 mg Intravenous Q2 Min PRN Hardeep Major DO        Or     naloxone (NARCAN) injection 0.4 mg  0.4 mg Intravenous Q2 Min PRN Hardeep Major DO        Or     naloxone (NARCAN) injection 0.2 mg  0.2 mg Intramuscular Q2 Min PRN Hardeep Major DO        Or     naloxone (NARCAN) injection 0.4 mg  0.4 mg Intramuscular Q2 Min PRN Hardeep Major DO         norepinephrine (LEVOPHED) 16 mg in sodium chloride 0.9 % 250 mL infusion CENTRAL  0.01-0.6 mcg/kg/min Intravenous Continuous ShalondaMariah fish MD   Paused at 09/01/24 0924     ondansetron (ZOFRAN ODT) ODT tab 4 mg  4 mg Oral Q6H PRN Jose Hamilton MD        Or     ondansetron (ZOFRAN) injection 4 mg  4 mg Intravenous Q6H PRN Jose Hamilton MD         oxyBUTYnin (DITROPAN) tablet 5 mg  5 mg Oral QAM Jose Hamilton MD   5 mg at 09/01/24 0837     oxyCODONE (ROXICODONE) tablet 5 mg  5 mg Oral Q4H PRN Hardeep Major DO   5 mg at 09/01/24 0059     pantoprazole (PROTONIX) EC tablet 40 mg  40 mg Oral BID AC Hardeep Major DO   40 mg at 09/02/24 0835     polyethylene glycol (MIRALAX) Packet 17 g  17 g Oral Daily Jose Hamilton MD   17 g at 09/01/24 0838     prochlorperazine (COMPAZINE) injection 5 mg  5 mg Intravenous Q6H PRN Jose Hamilton MD        Or     prochlorperazine (COMPAZINE) tablet 5 mg  5 mg Oral Q6H PRN Jose Hamilton MD        Or     prochlorperazine (COMPAZINE) suppository 12.5 mg  12.5 mg Rectal Q12H PRN Jose Hamilton MD         senna-docusate (SENOKOT-S/PERICOLACE) 8.6-50 MG per tablet 1 tablet  1 tablet Oral BID PRN Jose Hamilton MD        Or     senna-docusate (SENOKOT-S/PERICOLACE) 8.6-50 MG per tablet 2 tablet  2 tablet Oral BID PRN Jose Hamilton MD         sodium chloride (PF) 0.9% PF flush 10 mL  10 mL Intracatheter Q15 Min PRN Vania De Paz, PA-C         sodium chloride (PF) 0.9% PF flush 10-40 mL  10-40 mL  "Intracatheter Once PRN Julianne Sahu MD         sodium chloride (PF) 0.9% PF flush 3 mL  3 mL Intracatheter q1 min prn Hardeep Major, DO         sodium chloride (PF) 0.9% PF flush 3 mL  3 mL Intracatheter Q8H Hardeep Major, DO   3 mL at 09/02/24 0558     sodium chloride (PF) 0.9% PF flush 3 mL  3 mL Intracatheter Q8H Jose Hamilton MD   10 mL at 09/02/24 0047     sodium chloride (PF) 0.9% PF flush 3 mL  3 mL Intracatheter q1 min prn Jose Hamilton MD         sodium chloride (PF) 0.9% PF flush 9 mL  9 mL Intracatheter During Dialysis/CRRT (from stock) Vania De Paz PA-C         sodium chloride (PF) 0.9% PF flush 9 mL  9 mL Intracatheter During Dialysis/CRRT (from stock) Vania De Paz PA-C         sodium chloride 0.9% BOLUS 100-150 mL  100-150 mL Intravenous Q15 Min PRN Vania De Paz PA-EL         sodium chloride 0.9% BOLUS 100-150 mL  100-150 mL Intravenous Q15 Min PRN Vania De Paz PA-C         urea (GORMEL) 20 % cream CREA   Topical Daily Jose Hamilton MD   Given at 09/01/24 0851     Warfarin Dose Required Daily - Pharmacist Managed  1 each Oral See Admin Instructions Mariah Liz MD         wound support modular (EXPEDITE) bottle 60 mL  60 mL Oral Daily Orlando Villasenor RD   60 mL at 09/01/24 0909     No current [facility]-ordered outpatient medications on file.       Cardiographics:    Telemetry: A-fib     Echo:  The left ventricle is normal in size.  The visual ejection fraction is 35-40%.  There is moderate global hypokinesia of the left ventricle.  The right ventricle is moderately dilated.  Moderately decreased right ventricular systolic function  There is mild to moderate (1-2+) mitral regurgitation.  There is mild (1+) aortic regurgitation.  RVSP 29mmHg + RAP  Compared to the prior study dated 6/20/2022 (report in Care Everywhere) the EF  has decreased and the MR has increased   Lab Results    Chemistry/lipid CBC Cardiac Enzymes/BNP/TSH/INR   No results for input(s): \"CHOL\", \"HDL\", \"LDL\", " "\"TRIG\", \"CHOLHDLRATIO\" in the last 32903 hours.  No results for input(s): \"LDL\" in the last 62000 hours.  Recent Labs   Lab Test 08/30/24  0846 08/30/24  0519   NA  --  133*   POTASSIUM  --  3.4   CHLORIDE  --  98   CO2  --  25   * 189*   BUN  --  24.4*   CR  --  2.77*   GFRESTIMATED  --  23*   MELISSA  --  7.7*     Recent Labs   Lab Test 08/30/24  0519 08/29/24  1257 08/29/24  0606   CR 2.77* 3.92* 3.96*     Recent Labs   Lab Test 08/01/22  1334   A1C 4.8          Recent Labs   Lab Test 08/30/24  0519   WBC 8.9   HGB 7.8*  7.8*   HCT 22.9*   MCV 97        Recent Labs   Lab Test 08/30/24  0519 08/30/24  0109 08/29/24  1818   HGB 7.8*  7.8* 8.1* 8.3*    No results for input(s): \"TROPONINI\" in the last 09877 hours.  Recent Labs   Lab Test 08/28/24  1231   NTBNPI 39,340*     Recent Labs   Lab Test 08/28/24  1231   TSH 16.20*     Recent Labs   Lab Test 08/30/24  0519 08/29/24  1336 08/29/24  0113   INR 1.41* 2.19* 5.81*                   "

## 2024-09-02 NOTE — PLAN OF CARE
Goal Outcome Evaluation:       Pt remains ICU status for monitoring. BP remains stable on PO midodrine. Continues to have intermittent afib with RVR and nonsustained runs of vtach on tele monitor. HR elevated this morning to 140s-150s but improved with scheduled PO metoprolol. Again this evening during dialysis pt had HR up to 150s sustaining--PRN IV metoprolol given x 1. Dialysis RN quit run early due to elevated HR and a nonsustained run of Vtach noted on monitor. Pt denies any pain. Pt remained on bedrest today due to NWB status of left leg. Routine turn and repositioning to offload back/sacrum. Wound cares on back wound completed. Small amount of PO intake.      Overall Patient Progress: improving

## 2024-09-02 NOTE — PROGRESS NOTES
RENAL PROGRESS NOTE    ASSESSMENT & PLAN:     - normal MWF dialysis, run today  - Plans for very gentle dialysis today and only removal of fluid as able to tolerate.   - Order placed for fistulagram for this week. NPO orders in place for Tuesday 0001, with hope for fistulagram (can remove NPO if unable to do Tuesday)   - Run with CVC until access cleared by IR, Left arm fracture: caution gentle cannulation. OK then to remove.     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  Runs now at [facility] under Cares of Dr Helm, recently established  MWF 3.5 TT, K3 bath  S/P Thrombectomy 8/12/24 at [facility] , this arm access has not been utilized since this time.    Has CVC, placed 8/14 following fracture of arm     HD 8/31, 3 L removed   UF 9/1, 2L removed  Plans for very gentle pull today as tolerated    Access:   L AVF creation 6/2022 (by Dr Rebolledo, [place]?)   Unclear functional status of AVF, throbectomy 8/12 ([facility]) and not cannulated since this time, utilizing cvc.  Plan for fistulagram early this week  Orders in for IR for fistulagram  If following fistulagram, access patent, OK to use, with strong caution that arm has fracture ad to be cautious with use  If AVF is good for use, OK to remove CVC form renal    Hypokalemia  Low recently since mid August, available historical labs normal-high  In dialysis clinic has been encouraged to increase dietary K   Is on K3 bath typically   K with replacement today     Hypervolemia  Poor HD runs outpt due to hypotension  Ran on Thursday but little volume removal due to BP  Chest tube in place w 1800mL net out per nurse report  Albumin with HD to attempt to maintain hemodynamics for UF       MBD-CKD - phos at goal, calcium with correction lower end of normal     HTN - metoprolol  Hypotensive,and now on pressors now        DM2 - no noted diabetic medications. Hypoglycemia noted, ER/hospital  following.       Afib / Vtach - metoprolol  S/p amiodarone infusion   Cardiology following     HLD- statin     Thyroid disease - levothyroxine   TSH very high, ? Taking medication as Rx PTA     SUBJECTIVE:    Seen in room  Seemed to be sleeping but woken up and shares listens to tv with eyes closed  Reviewed dialysis and access plan.   Hope for fistualgram tomorrow and If OK to use arm access, pull CVC  No acute concerns today   Plan for gentle HD run    OBJECTIVE:  Physical Exam   Temp: 97.4  F (36.3  C) Temp src: Axillary BP: (!) 87/57 Pulse: 79   Resp: 14 SpO2: 98 % O2 Device: None (Room air)    Vitals:    08/31/24 1237 09/01/24 1400 09/02/24 0041   Weight: 88 kg (194 lb 0.1 oz) 86.5 kg (190 lb 11.2 oz) 86.5 kg (190 lb 11.2 oz)     Vital Signs with Ranges  Temp:  [97.3  F (36.3  C)-98.7  F (37.1  C)] 97.4  F (36.3  C)  Pulse:  [] 79  Resp:  [7-29] 14  BP: ()/(47-73) 87/57  SpO2:  [59 %-100 %] 98 %  I/O last 3 completed shifts:  In: 278.75 [P.O.:120; I.V.:158.75]  Out: 2000 [Other:2000]        Patient Vitals for the past 72 hrs:   Weight   09/02/24 0041 86.5 kg (190 lb 11.2 oz)   09/01/24 1400 86.5 kg (190 lb 11.2 oz)   08/31/24 1237 88 kg (194 lb 0.1 oz)   [unfilled]    PHYSICAL EXAM:  General: sitting up in bed, awake, alert, oriented. Poor historican  HEENT:  Pupils equal, round, no scleral icterus  NECK:  no carotid bruits, no JVD  RESPIRATORY:  Lungs without crackles on anterior auscultation   CARDIOVASCULAR:  RRR, no murmur  VOLUME: pitting edema, less tense fludi filled today   ABDOMEN:  soft, BS present, non-tender, non-distended   GENITOURINARY:  No sanches, not urinating   INTEGUMENTARY: Warm, dry, no rash  NEUROLOGIC: grossly intact   Psych: calm, cooperative  ACCESS:   Cvc  Left upper AVF - bruit heard, no palpable thrill on exam     LABORATORY STUDIES:     Recent Labs   Lab 09/02/24  0517 09/01/24  0857 08/30/24  1206 08/30/24  0519 08/30/24  0109 08/29/24  1818 08/29/24  1336 08/29/24  1257  08/29/24  0113   WBC 16.4* 17.5*  --  8.9  --   --  10.2 10.4 7.4   RBC 2.43* 2.58*  --  2.37*  --   --  2.22* 2.13* 3.42*   HGB 8.0* 8.6* 8.6* 7.8*  7.8* 8.1* 8.3* 7.2* 7.0* 11.2*   HCT 23.8* 25.2*  --  22.9*  --   --  21.5* 20.8* 32.7*    188  --  234  --   --  328 318 217       Basic Metabolic Panel:  Recent Labs   Lab 09/02/24  0517 09/01/24  1349 09/01/24  1002 09/01/24  0525 08/31/24 2126 08/31/24  1738 08/31/24  1202 08/31/24  0541 08/30/24  1646 08/30/24  1640 08/30/24  1206 08/30/24  0846 08/30/24  0519 08/29/24  1647 08/29/24  1257 08/29/24  1027 08/29/24  0606 08/28/24  2130 08/28/24  1927     --   --  138  --   --   --   --   --   --   --   --  133*  --  135  --  131*  --  132*   POTASSIUM 3.3*  --   --  3.6  3.6  --   --   --  4.0  --  3.8 3.5  --  3.4  --  3.4   < > 2.8*  2.8*   < > 2.7*   CHLORIDE 99  --   --  99  --   --   --   --   --   --   --   --  98  --  94*  --  93*  --  93*   CO2 22  --   --  22  --   --   --   --   --   --   --   --  25  --  25  --  26  --  23   BUN 36.5*  --   --  25.1*  --   --   --   --   --   --   --   --  24.4*  --  38.9*  --  38.0*  --  36.5*   CR 2.95*  --   --  2.44*  --   --   --   --   --   --   --   --  2.77*  --  3.92*  --  3.96*  --  3.83*   GLC 85 116* 119* 126* 129* 154*   < > 174*   < >  --   --    < > 189*   < > 121*   < > 87  87   < > 127*   MELISSA 8.0*  --   --  8.1*  --   --   --   --   --   --   --   --  7.7*  --  6.7*  --  7.5*  --  7.4*    < > = values in this interval not displayed.       INR  Recent Labs   Lab 09/02/24  0517 09/01/24  0525 08/31/24  0541 08/30/24  1835   INR 2.40* 1.66* 1.29* 1.28*        Recent Labs   Lab Test 09/02/24  0517 09/01/24  0857 09/01/24  0525   INR 2.40*  --  1.66*   WBC 16.4* 17.5*  --    HGB 8.0* 8.6*  --     188  --        Personally reviewed current labs       Vania De Paz PA-C  [facility]  [phone]

## 2024-09-02 NOTE — PROGRESS NOTES
ICU PROGRESS NOTE:    Patient Summary:  Ijeoma Polk is a 74 year old gentleman with a past medical history significant for gastric bypass, short gut syndrome, chronic diarrhea, end-stage renal disease who was previously dialyzing via left upper extremity AV fistula now through a right tunneled subclavian catheter following thrombosis of graft after recent fall, hypertension, paroxysmal atrial fibrillation on anticoagulation, type 2 diabetes, presenting to the hospital for septic shock of unclear etiology.    Lines/Drains/Tubes:  CVC Triple Lumen Right Femoral (Active)   Site Assessment WDL 09/02/24 0800   Dressing Chlorhexidine disk;Transparent;Securement device 09/02/24 0800   Dressing Status clean;dry;intact 09/01/24 2000   Dressing Intervention dressing changed 09/01/24 1400   Line Necessity Yes, meets criteria 09/02/24 0800   Blue - Status saline locked 09/02/24 0800   Blue - Intervention Flushed;Cap change 08/31/24 0400   Brown - Status saline locked 09/02/24 0800   Brown - Intervention Flushed 08/31/24 0800   White - Status saline locked 09/02/24 0800   White - Intervention Flushed 08/30/24 0800   Phlebitis Scale 0-->no symptoms 09/02/24 0800   Infiltration? no 09/02/24 0800   Number of days: 4       Hemodialysis Vascular Access Right Subclavian (Active)   Site Assessment WDL 09/02/24 0800   Dressing Intervention Dressing changed 08/29/24 1438   Dressing Status Clean, dry, intact 09/02/24 0800   Hand Off Report Yes 09/01/24 1730   Number of days:        Hemodialysis Vascular Access AV fistula Left Arm (Active)   Site Assessment WDL;Lionel present 09/02/24 0800   Number of days:      Overnight events:  Underwent UF on 9/1 and had 2 g removed.  Overnight became hypotensive and his metoprolol was held for this reason.  This morning developed atrial fibrillation with a rapid ventricular response.    Subjective:  Generalized pain.  States that he is scared that he is going to die.    Objective:  Physical  Exam:  Vent settings for last 24 hours:  Resp: 19    BP 98/82   Pulse (!) 155   Temp 97.5  F (36.4  C) (Oral)   Resp 19   Wt 86.5 kg (190 lb 11.2 oz)   SpO2 93%   BMI 25.86 kg/m      Intake/Output last 3 shifts:  I/O last 3 completed shifts:  In: 278.75 [P.O.:120; I.V.:158.75]  Out: 2000 [Other:2000]  Intake/Output this shift:  I/O this shift:  In: 120 [P.O.:120]  Out: -     Physical Exam  Constitutional:       Appearance: Normal appearance.   HENT:      Head: Normocephalic.      Comments: Ecchymosis noted on face.     Nose: Nose normal.   Cardiovascular:      Heart sounds: Normal heart sounds.      Comments: In and out of A-Fib.  Pulmonary:      Comments: Diminished BS BL.  Abdominal:      General: Abdomen is flat.   Skin:     Comments: Bed sores noted on thoracic spine and in sacrum.   Neurological:      Mental Status: He is alert.           LAB:   Latest Reference Range & Units 08/30/24 11:51   Cell Count Fluid Source  Pleural Cavity, Right   Total Nucleated Cells /uL 4,817   % Neutrophils Fluid % 88   % Lymphocytes Fluid % 5   % Mono/Macro Fluid % 1   % Lining Cells % 4   % Other Cells Fluid % 2   Color Fluid Colorless, Yellow  Red !   Appearance Fluid Clear  Slightly Bloody !   Glucose Fluid Source  Pleural Cavity, Right   Glucose Fluid mg/dL 182   LD Fluid Source  Pleural Cavity, Right   Lactate Dehydrogenase Fluid U/L 811   pH Fluid Source  Pleural Cavity, Right   Ph Fluid pH 8.0   Protein Fluid Source  Pleural Cavity, Right   Protein Total Fluid g/dL 1.8   !: Data is abnormal  Recent Labs   Lab 09/02/24  0517   WBC 16.4*   HGB 8.0*   HCT 23.8*        Recent Labs   Lab 09/02/24  0517 09/01/24  0525 08/30/24  0519 08/29/24  1257 08/28/24  1927 08/28/24  1231    138 133* 135   < > 133*  132*   CO2 22 22 25 25   < > 21*  21*   BUN 36.5* 25.1* 24.4* 38.9*   < > 36.0*  34.7*   ALKPHOS  --   --  104 147  --  158*   ALT  --   --  14 11  --  21   AST  --   --  20 21  --   --     < > = values in  this interval not displayed.       RADIOLOGY:  MICRO:  Date Source Organism   8/28 Blood NGTD      Organism Antibiotic Antibiotic Start Date Antibiotic End Date   NGTD Cefepime 8/28 Ongoing     Vancomycin 8/28 Ongoing         IMAGING:  CT CAP 8/29/24:  1.  Large right pleural effusion and moderate left pleural effusion with bilateral lower lobe, middle lobe, and lingular consolidation, which could represent compressive atelectasis or pneumonia.  2.  Extensive endobronchial opacities in the right lower lobe, left lower lobe, and middle lobe, which could be from aspiration, mucous plugging, or retained secretions.  3.  Increased fluid in the esophagus suggesting gastroesophageal reflux.  4.  Increased fluid in the colon, which is a nonspecific marker of diarrhea.    (Unchanged from previous visit)  XR Pelvis 8/29/24:  Right femoral catheter terminates in the right hemipelvis. No acute fracture or dislocation. Mild degenerative narrowing of bilateral hip joints. Significant bilateral iliofemoral arterial atherosclerotic calcification.     CXR 8/28/24:  There is a tunneled dual lumen right IJ catheter with the tip over the right atrium. There are pleural effusions with atelectasis. Cannot assess for underlying airspace disease. No pneumothorax. The heart is not well assessed.      CT Head w/o Contrast 8/28/24:  1.  No acute intracranial process.  2.  Chronic small vessel ischemic disease and generalized cerebral atrophy.  3.  Encephalomalacia in the left frontal and left occipital lobes.        Assessment/Plan:  Ijeoma Polk is a 74 year old gentleman with a past medical history significant for gastric bypass, short gut syndrome, chronic diarrhea, end-stage renal disease who was previously dialyzing via left upper extremity AV fistula now through a right tunneled subclavian catheter following thrombosis of graft after recent fall, hypertension, paroxysmal atrial fibrillation on anticoagulation, type 2 diabetes,  presenting to the hospital for septic shock likely from infected pressure ulcers.     NEURO: No acute issues presently.   Continue scheduled Tylenol for now.  As needed Oxycodone.  CVS:Has a known medical history of hypertension and paroxysmal atrial fibrillation.  He is on beta-blockers and systemic anticoagulation for these and presented yesterday with hypotension and lactic acidosis likely from septic shock.  He has previously been noted to raise concerns for adrenal insufficiency and had a spot cortisol checked which was noted to be within normal limits.  He is typically on outpatient midodrine for low blood pressures.  Was noted to have runs of A-fib with RVR while in the emergency department and was administered metoprolol x 2 and switched over to an amiodarone infusion subsequently on.  He was also noted to have nonsustained runs of ventricular tachycardia which were likely secondary to his ongoing hypokalemia and have presently resolved. Amiodarone was resumed on 8/30 due to A-fib with rates >150 and concomitant drop in blood pressures. Overnight on 8/30, amiodarone infusion discontinued again due to bradycardia.  Underwent a transthoracic echocardiogram which revealed an EF of 35-40% with moderate global hypokinesis of the left ventricle and moderate dilation of the right ventricle.  This may be contributing to his ongoing hypotension.  Hypotensive again overnight on 9/1 and metoprolol was once again held.  This morning on 9/2 was again noted to be in A-fib with RVR.  Continue telemetry monitoring.  MAP goal of 60mmHg with SBP>90mmHg.  Vasopressors: Norepinephrine   Continue previously scheduled midodrine 3 times daily.  Stress dose steroids 25 mg Q8h.  Afib  Being treated with Amiodarone which is intermittently being held.   Continue metoprolol per cardiology for his permanent A-Fib. Likely leading to some drop in his blood pressures.  Continue coumadin.  May require eventual cardiac evaluation.  RESP: CT  CAP with BL Pleural effusions, R>L. Right chest tube with 1.8l drainage and consistent with an exudate. Repeat imaging with near clearance of right pleural space. Small left sided pleural effusion.  Right chest tube removed on 8/31.  Maintain SpO2 of >92%.  GI:Prior history of small bowel obstruction status post small bowel resection and since then short gut syndrome and chronic diarrhea.  Is chronically malnourished and presently not willing to have NG access placed for tube feeds.  Resume p.o. diet.  Continue Protonix daily for ulcer prophylaxis.  Have talked to nutrition and will do calorie counting.  Malnutrition:    - Level of malnutrition: Moderate   5. RENAL : Has known end-stage renal disease due to glomerulonephritis and has previously been maintained on q. Monday, Wednesday and Friday hemodialysis through a left upper extremity fistula.  During his recent hospitalization, he developed a thrombus in this and underwent a thrombectomy of his fistula.  He was subsequently initiated on dialysis through a right tunneled subclavian access.  He is noted to have multiple electrolyte abnormalities most notably hypokalemia and is due to be run against a higher potassium bath. Underwent a dialysis run on 8/29. Will touch base with nephrology about whether or not his AVF can be used so we are able to remove his tunneled access.  Would trend renal function daily and closely follow I/O.  Follow electrolytes and correct as abnormalities arise.  ID:Presents with leukocytosis and septic shock likely secondary to infected wounds in his back.  MRSA noted to be negative.  Follow Blood cultures.  Antibiotics: Unclear how cefepime has been discontinued, have resumed this given ongoing leukocytosis.  Repeat procalcitonin today.  HEMATOLOGIC:Noted to have acute onset of anemia and elevated INR. Received a total of 5m of Vitamin K and was administered 1 unit PRBC for Hb<7. INR now subtherapeutic, has been resumed on  coumadin.  Daily CBC.  Continue coumadin.  ORTHO: Noted to have left humeral and left patellar fractures s/p ORIF. Is non-weight bearing per ortho notes from [facility].  Seen by orthopedics on 8/31 and left upper extremity splint was removed by them.  Okay to start working on range of motion.  Will request them to also assess left lower extremity--pending.  ENDOCRINE:Known medical history of DM and presented with significant hypoglycemia despite ampules of D50 being administered.  He is also noted to be hypothyroid.  Will hold off on sliding scale insulin given minimal PO intake and blood sugars persistently below 180mg/dl.  Accu-Cheks 4 times daily and with meals.  Continue outpatient levothyroxine.  ICU PROPHYLAXIS:Protonix, coumadin.  DISPO:Unclear. Continues to require ICU levels of care.         Total Critical Care Time : 45 minutes.    Mariah Liz MD  Pulmonary and Critical Care  [facility]

## 2024-09-03 NOTE — PROGRESS NOTES
ICU PROGRESS NOTE:    Patient Summary:  Ijeoma Plok is a 74 year old gentleman with a past medical history significant for gastric bypass, short gut syndrome, chronic diarrhea, end-stage renal disease who was previously dialyzing via left upper extremity AV fistula now through a right tunneled subclavian catheter following thrombosis of graft after recent fall, hypertension, paroxysmal atrial fibrillation on anticoagulation, type 2 diabetes, presenting to the hospital for septic shock of unclear etiology.    Lines/Drains/Tubes:  CVC Triple Lumen Right Femoral (Active)   Site Assessment WDL 09/03/24 0400   Dressing Chlorhexidine disk;Transparent;Securement device 09/03/24 0400   Dressing Status clean;dry;intact 09/03/24 0400   Dressing Intervention dressing changed 09/01/24 1400   Line Necessity Yes, meets criteria 09/03/24 0400   Blue - Status saline locked 09/03/24 0400   Blue - Intervention Flushed 09/02/24 2000   Brown - Status saline locked 09/03/24 0400   Brown - Intervention Flushed 09/02/24 2000   White - Status saline locked 09/03/24 0400   White - Intervention Flushed 09/02/24 2000   Phlebitis Scale 0-->no symptoms 09/03/24 0400   Infiltration? no 09/03/24 0400   Number of days: 5       Hemodialysis Vascular Access Right Subclavian (Active)   Site Assessment WDL 09/03/24 0400   Dressing Intervention Dressing changed 09/03/24 0400   Dressing Status Clean, dry, intact 09/03/24 0400   Hand Off Report Yes 09/01/24 1730   Number of days:        Hemodialysis Vascular Access AV fistula Left Arm (Active)   Site Assessment WDL;Thrill present 09/03/24 0400   Number of days:      Overnight events:  Dialysis attempted but patient developed sustained VT and dialysis was discontinued.     Subjective:  Generalized pain.      Objective:  Physical Exam:  Vent settings for last 24 hours:  Resp: 19    /55   Pulse (!) 137   Temp 98.5  F (36.9  C) (Oral)   Resp 19   Wt 87.7 kg (193 lb 5.5 oz)   SpO2 98%   BMI  26.22 kg/m      Intake/Output last 3 shifts:  I/O last 3 completed shifts:  In: 780 [P.O.:780]  Out: 50 [Urine:50]  Intake/Output this shift:  No intake/output data recorded.    Physical Exam  Constitutional:       Appearance: Normal appearance.   HENT:      Head: Normocephalic.      Comments: Ecchymosis noted on face.     Nose: Nose normal.   Cardiovascular:      Heart sounds: Normal heart sounds.      Comments: In and out of A-Fib.  Pulmonary:      Comments: Diminished BS BL.  Abdominal:      General: Abdomen is flat.   Skin:     Comments: Bed sores noted on thoracic spine and in sacrum.   Neurological:      Mental Status: He is alert.           LAB:   Latest Reference Range & Units 08/30/24 11:51   Cell Count Fluid Source  Pleural Cavity, Right   Total Nucleated Cells /uL 4,817   % Neutrophils Fluid % 88   % Lymphocytes Fluid % 5   % Mono/Macro Fluid % 1   % Lining Cells % 4   % Other Cells Fluid % 2   Color Fluid Colorless, Yellow  Red !   Appearance Fluid Clear  Slightly Bloody !   Glucose Fluid Source  Pleural Cavity, Right   Glucose Fluid mg/dL 182   LD Fluid Source  Pleural Cavity, Right   Lactate Dehydrogenase Fluid U/L 811   pH Fluid Source  Pleural Cavity, Right   Ph Fluid pH 8.0   Protein Fluid Source  Pleural Cavity, Right   Protein Total Fluid g/dL 1.8   !: Data is abnormal  Recent Labs   Lab 09/03/24  0340   WBC 17.8*   HGB 8.9*   HCT 26.3*   *     Recent Labs   Lab 09/03/24  0340 09/02/24  0517 09/01/24  0525 08/30/24  0519 08/29/24  1257 08/28/24  1927 08/28/24  1231    135 138 133* 135   < > 133*  132*   CO2 21* 22 22 25 25   < > 21*  21*   BUN 41.1* 36.5* 25.1* 24.4* 38.9*   < > 36.0*  34.7*   ALKPHOS  --   --   --  104 147  --  158*   ALT  --   --   --  14 11  --  21   AST  --   --   --  20 21  --   --     < > = values in this interval not displayed.       RADIOLOGY:  MICRO:  Date Source Organism   8/28 Blood NGTD      Organism Antibiotic Antibiotic Start Date Antibiotic End Date    NGTD Cefepime 8/28 Ongoing     Vancomycin 8/28 Ongoing         IMAGING:  CT CAP 8/29/24:  1.  Large right pleural effusion and moderate left pleural effusion with bilateral lower lobe, middle lobe, and lingular consolidation, which could represent compressive atelectasis or pneumonia.  2.  Extensive endobronchial opacities in the right lower lobe, left lower lobe, and middle lobe, which could be from aspiration, mucous plugging, or retained secretions.  3.  Increased fluid in the esophagus suggesting gastroesophageal reflux.  4.  Increased fluid in the colon, which is a nonspecific marker of diarrhea.    (Unchanged from previous visit)  XR Pelvis 8/29/24:  Right femoral catheter terminates in the right hemipelvis. No acute fracture or dislocation. Mild degenerative narrowing of bilateral hip joints. Significant bilateral iliofemoral arterial atherosclerotic calcification.     CXR 8/28/24:  There is a tunneled dual lumen right IJ catheter with the tip over the right atrium. There are pleural effusions with atelectasis. Cannot assess for underlying airspace disease. No pneumothorax. The heart is not well assessed.      CT Head w/o Contrast 8/28/24:  1.  No acute intracranial process.  2.  Chronic small vessel ischemic disease and generalized cerebral atrophy.  3.  Encephalomalacia in the left frontal and left occipital lobes.        Assessment/Plan:  Ijeoma Polk is a 74 year old gentleman with a past medical history significant for gastric bypass, short gut syndrome, chronic diarrhea, end-stage renal disease who was previously dialyzing via left upper extremity AV fistula now through a right tunneled subclavian catheter following thrombosis of graft after recent fall, hypertension, paroxysmal atrial fibrillation on anticoagulation, type 2 diabetes, presenting to the hospital for septic shock likely from infected pressure ulcers.     NEURO: No acute issues presently.   Continue scheduled Tylenol for now.  As  needed Oxycodone.  CVS:Has a known medical history of hypertension and paroxysmal atrial fibrillation.  He is on beta-blockers and systemic anticoagulation for these and presented yesterday with hypotension and lactic acidosis likely from septic shock.  He has previously been noted to raise concerns for adrenal insufficiency and had a spot cortisol checked which was noted to be within normal limits.  He is typically on outpatient midodrine for low blood pressures.  Was noted to have runs of A-fib with RVR while in the emergency department and was administered metoprolol x 2 and switched over to an amiodarone infusion subsequently on.  He was also noted to have nonsustained runs of ventricular tachycardia which were likely secondary to his ongoing hypokalemia and have presently resolved. Amiodarone was resumed on 8/30 due to A-fib with rates >150 and concomitant drop in blood pressures. Overnight on 8/30, amiodarone infusion discontinued again due to bradycardia.  Underwent a transthoracic echocardiogram which revealed an EF of 35-40% with moderate global hypokinesis of the left ventricle and moderate dilation of the right ventricle.  This may be contributing to his ongoing hypotension.  Hypotensive again overnight on 9/1 and metoprolol was once again held.  This morning on 9/2 was again noted to be in A-fib with RVR.  Continue telemetry monitoring.  MAP goal of 60mmHg with SBP>90mmHg.  Continue previously scheduled midodrine 3 times daily.  Stress dose steroids 25 mg Q8h--last day today.  Afib  Being treated with Amiodarone which is intermittently being held.   Continue metoprolol per cardiology for his permanent A-Fib. Likely leading to some drop in his blood pressures.  Continue coumadin.  May require eventual cardiac evaluation.  RESP: CT CAP with BL Pleural effusions, R>L. Right chest tube with 1.8l drainage and consistent with an exudate. Repeat imaging with near clearance of right pleural space. Small left  sided pleural effusion.  Right chest tube removed on 8/31.  Maintain SpO2 of >92%.  GI:Prior history of small bowel obstruction status post small bowel resection and since then short gut syndrome and chronic diarrhea.  Is chronically malnourished and presently not willing to have NG access placed for tube feeds.  Resume p.o. diet.  Continue Protonix daily for ulcer prophylaxis.  Have talked to nutrition and will do calorie counting.  Malnutrition:    - Level of malnutrition: Moderate   5. RENAL : Has known end-stage renal disease due to glomerulonephritis and has previously been maintained on q. Monday, Wednesday and Friday hemodialysis through a left upper extremity fistula.  During his recent hospitalization, he developed a thrombus in this and underwent a thrombectomy of his fistula.  He was subsequently initiated on dialysis through a right tunneled subclavian access.  He is noted to have multiple electrolyte abnormalities most notably hypokalemia and is due to be run against a higher potassium bath. Underwent a dialysis run on 8/29. Will touch base with nephrology about whether or not his AVF can be used so we are able to remove his tunneled access. Requested that they not pull fluid on run 9/2 as the patient requires ongoing vasopressor support--run terminated due to VT.   Would trend renal function daily and closely follow I/O.  Follow electrolytes and correct as abnormalities arise.  Has significant generalized edema likely from hypoalbuminemia. Clinically low intravascular volume. Would be judicious about volume removal.  ID:Presents with leukocytosis and septic shock likely secondary to infected wounds in his back.  MRSA noted to be negative. Worsening inflammatory markers and leukocytosis. Repeat procalcitonin improved but not down by 50% indicating potential need to broaden antibiotics.  Follow Blood cultures, have ordered new set today.  Antibiotics: Unclear how cefepime has been discontinued, have  resumed this given ongoing leukocytosis.  Have ordered CXR today.  HEMATOLOGIC:Noted to have acute onset of anemia and elevated INR. Received a total of 5m of Vitamin K and was administered 1 unit PRBC for Hb<7. INR now therapeutic, has been resumed on coumadin.  Daily CBC.  Continue coumadin.  ORTHO: Noted to have left humeral and left patellar fractures s/p ORIF. Is non-weight bearing per ortho notes from [facility].  Seen by orthopedics on 8/31 and left upper extremity splint was removed by them.  Okay to start working on range of motion.  Will request them to also assess left lower extremity--pending.  ENDOCRINE:Known medical history of DM and presented with significant hypoglycemia despite ampules of D50 being administered.  He is also noted to be hypothyroid.  Will hold off on sliding scale insulin given minimal PO intake and blood sugars persistently below 180mg/dl.  Accu-Cheks 4 times daily and with meals.  Continue outpatient levothyroxine.  ICU PROPHYLAXIS:Protonix, coumadin.  DISPO: Should be stepped down to IMC status..         Total Critical Care Time : 40 minutes.    Mariah Liz MD  Pulmonary and Critical Care  [facility], [place]

## 2024-09-03 NOTE — PROGRESS NOTES
Ortho will sign off. Please contact with any further questions or concerns.      Letty Rich PA-C

## 2024-09-03 NOTE — PROGRESS NOTES
Care Management Follow Up    Length of Stay (days): 6    Expected Discharge Date: 09/06/2024     Concerns to be Addressed: discharge planning     Patient plan of care discussed at interdisciplinary rounds: Yes    Anticipated Discharge Disposition: Assisted Living, Transitional Care, Home Care              Anticipated Discharge Services: Home Care  Anticipated Discharge DME: None    Patient/family educated on Medicare website which has current facility and service quality ratings: yes  Education Provided on the Discharge Plan: Yes  Patient/Family in Agreement with the Plan: yes    Referrals Placed by CM/SW: Post Acute Facilities  Private pay costs discussed: Not applicable    Discussed  Partnership in Safe Discharge Planning  document with patient/family: No     Handoff Completed: No, handoff not indicated or clinically appropriate    Additional Information:  Spoke with pt's son, Austin [phone], he stated he spoke with the doctor this morning and the doctor put in for a Palliative consult. Austin wants to hold off on looking for a TCU today and see what Palliative says when they met with the pt.     Next Steps: Wait for Palliative consult.     David Thomas RN

## 2024-09-03 NOTE — CONSULTS
Interventional Radiology - Consultation CHART REVIEW Note:  [facility]  2024     Reason for Consult:  Fistulogram   Requesting Provider:  Vania De Paz PA-C     HPI:  Ijeoma Polk is a 74 year old male with with multiple medical problems including HTN, type 2 diabetes mellitus, atrial fib on chronic anticoagulation, gastric bypass, ESRD on HD who sustained a recent mechanical fall from standing and multiple  fractures including a left distal humerus fracture. Per chart review vascular surgery was called to the OR after patient was intubated for planned orthopedic intervention when the patient was observed to have lost a thrill of the upper extremity arteriovenous graft; s/p thrombectomy 24 in [place].     Now with reports per order details of left upper AVF. ORIF of elbow, and thrombectomy of access. Not utilized since this time. Gillian historican, was treated in [facility] until recent admit here. IR consulted to perform fistulogram prior to using fistula for dialysis; currently dialyzes via tunneled HD catheter.      IMAGIN/12/24 Date of Operation: 2024   _________________________________________________________________     Preoperative Diagnosis: Closed fracture of distal end of left humerus, initial encounter [S42.402A]   Postoperative Diagnosis: Same     Procedure(s):   Panel I:      Open Reduction Internal Fixation Left Elbow - Left   Open Reduction Internal Fixation Left Patella, C-Arm - Left     Panel II:     THROMBECTOMY, AV FISTULA     Findings: Thrombosis of arteriovenous graft.  Restoration of flow observed postoperatively.  Thrill was not palpable likely secondary to hypotension.   _________________________________________________________________     Surgeon: Umesh Harper MD   Assistant: None     Anesthesia: General   EBL: 40 mL   Specimen: None   Complication: None   Implant: None        NPO Status:  midnight per chart review  and discussion with RN.  Anticoagulation/Antiplatelets/Bleeding tendencies:  aspirin 81mg daily per chart review and discussion with RN. Warfarin on hold.  Antibiotics:  on IV cefepime    PAST MEDICAL HISTORY:  Past Medical History:   Diagnosis Date    Chronic diarrhea     End stage renal disease (H)     H/O gastric bypass     History of bowel resection     Hypertension, surgical complication 3/9/2009    Hypokalemia     Membranous nephropathy determined by biopsy     PAF (paroxysmal atrial fibrillation) (H)     Type 2 diabetes mellitus without complication, without long-term current use of insulin (H) 08/10/2022       PAST SURGICAL HISTORY:  No past surgical history on file.    ALLERGIES:  Allergies   Allergen Reactions    Furosemide Hives    Lorazepam Rash    Penicillins Rash    Sulfadiazine Rash        MEDICATIONS:  Current Facility-Administered Medications   Medication Dose Route Frequency Provider Last Rate Last Admin    acetaminophen (TYLENOL) oral liquid 975 mg  975 mg Oral TID Hardeep Major DO   975 mg at 09/02/24 2107    albumin human 25 % injection 50 mL  50 mL Intravenous Q1H PRN Vania De Paz PA-C   50 mL at 09/02/24 1636    albumin human 5 % injection  mL   mL Intravenous Q1H PRN Vania De Paz PA-C   250 mL at 09/01/24 1522    alteplase (CATHFLO ACTIVASE) injection 2 mg  2 mg Intracatheter Q1H PRN Vania De Paz PA-C        alteplase (CATHFLO ACTIVASE) injection 2 mg  2 mg Intracatheter Q1H PRN Vania De Paz PA-C        amiodarone (PACERONE) tablet 200 mg  200 mg Oral Daily David Donis MD   200 mg at 09/02/24 0834    artificial saliva (BIOTENE MT) solution 1 spray  1 spray Mouth/Throat 4x Daily PRN Jose Hamilton MD        aspirin EC tablet 81 mg  81 mg Oral BID Jose Hamilton MD   81 mg at 09/02/24 2024    atorvastatin (LIPITOR) tablet 10 mg  10 mg Oral At Bedtime Jose Hamilton MD   10 mg at 09/02/24 2107    benzocaine-menthol (CEPACOL) 15-3.6 MG lozenge 1 lozenge  1  lozenge Buccal Q1H PRN Jose Hamilton MD        calcium carbonate (TUMS) chewable tablet 1,000 mg  1,000 mg Oral 4x Daily PRN Jose Hamilton MD        ceFEPIme (MAXIPIME) 2 g vial to attach to  mL bag for ADULTS or NS 50 mL bag for PEDS  2 g Intravenous Q24H Mariah Liz MD   2 g at 09/02/24 1304    collagenase (SANTYL) ointment   Topical Daily Jose Hamilton MD   Given at 09/02/24 1501    hydrocortisone sodium succinate PF (solu-CORTEF) injection 25 mg  25 mg Intravenous Q8H Mariah Liz MD   25 mg at 09/03/24 0045    levothyroxine (SYNTHROID/LEVOTHROID) tablet 50 mcg  50 mcg Oral QAM AC Jose Hamilton MD   50 mcg at 09/03/24 0632    lidocaine (LMX4) cream   Topical Q1H PRN Jose Hamilton MD        lidocaine 1 % 0.1-1 mL  0.1-1 mL Other Q1H PRN Jose Hamilton MD        lidocaine-prilocaine (EMLA) cream   Topical Daily PRN Jose Hamilton MD        melatonin tablet 1 mg  1 mg Oral At Bedtime PRN Jose Hamilton MD        [Held by provider] methocarbamol (ROBAXIN) tablet 750 mg  750 mg Oral TID Jose Hamilton MD   750 mg at 08/28/24 1942    metoprolol (LOPRESSOR) injection 5 mg  5 mg Intravenous Q5 Min PRN David Donis MD   5 mg at 09/03/24 0503    metoprolol tartrate (LOPRESSOR) tablet 25 mg  25 mg Oral or Feeding Tube BID David Donis MD   25 mg at 09/02/24 2024    midodrine (PROAMATINE) tablet 10 mg  10 mg Oral TID w/meals Idania Crenshaw MD   10 mg at 09/02/24 1655    naloxone (NARCAN) injection 0.2 mg  0.2 mg Intravenous Q2 Min PRN Hardeep Major DO        Or    naloxone (NARCAN) injection 0.4 mg  0.4 mg Intravenous Q2 Min PRN Hardeep Major DO        Or    naloxone (NARCAN) injection 0.2 mg  0.2 mg Intramuscular Q2 Min PRN Hardeep Major DO        Or    naloxone (NARCAN) injection 0.4 mg  0.4 mg Intramuscular Q2 Min PRN Hardeep Major DO        norepinephrine (LEVOPHED) 16 mg in sodium chloride 0.9 % 250 mL infusion CENTRAL  0.01-0.6 mcg/kg/min Intravenous Continuous Mariah Liz MD    Paused at 09/01/24 0924    ondansetron (ZOFRAN ODT) ODT tab 4 mg  4 mg Oral Q6H PRN Jose Hamilton MD        Or    ondansetron (ZOFRAN) injection 4 mg  4 mg Intravenous Q6H PRN Jose Hamilton MD        oxyBUTYnin (DITROPAN) tablet 5 mg  5 mg Oral QAM Jose Hamilton MD   5 mg at 09/02/24 0923    oxyCODONE (ROXICODONE) tablet 5 mg  5 mg Oral Q4H PRN aHrdeep Major,    5 mg at 09/01/24 0059    pantoprazole (PROTONIX) EC tablet 40 mg  40 mg Oral BID AC Hardeep Major DO   40 mg at 09/03/24 0632    polyethylene glycol (MIRALAX) Packet 17 g  17 g Oral Daily Jose Hamilton MD   17 g at 09/01/24 0838    prochlorperazine (COMPAZINE) injection 5 mg  5 mg Intravenous Q6H PRN Jose Hamilton MD        Or    prochlorperazine (COMPAZINE) tablet 5 mg  5 mg Oral Q6H PRN Jose Hamilton MD        Or    prochlorperazine (COMPAZINE) suppository 12.5 mg  12.5 mg Rectal Q12H PRN Jose Hamilton MD        senna-docusate (SENOKOT-S/PERICOLACE) 8.6-50 MG per tablet 1 tablet  1 tablet Oral BID PRN Jose Hamilton MD        Or    senna-docusate (SENOKOT-S/PERICOLACE) 8.6-50 MG per tablet 2 tablet  2 tablet Oral BID PRN Jose Hamilton MD        sodium chloride (PF) 0.9% PF flush 10 mL  10 mL Intracatheter Q15 Min PRN Vania De Paz PA-C        sodium chloride (PF) 0.9% PF flush 10-40 mL  10-40 mL Intracatheter Once PRN Julianne Sahu MD        sodium chloride (PF) 0.9% PF flush 3 mL  3 mL Intracatheter q1 min prn Hardeep Major,         sodium chloride (PF) 0.9% PF flush 3 mL  3 mL Intracatheter Q8H Hardeep Major DO   3 mL at 09/03/24 0339    sodium chloride (PF) 0.9% PF flush 3 mL  3 mL Intracatheter Q8H Jose Hamilton MD   3 mL at 09/03/24 0045    sodium chloride (PF) 0.9% PF flush 3 mL  3 mL Intracatheter q1 min prn Jose Hamilton MD        sodium chloride 0.9% BOLUS 100-150 mL  100-150 mL Intravenous Q15 Min PRN Vania De Paz PA-C        sodium chloride 0.9% BOLUS 100-150 mL  100-150 mL Intravenous Q15 Min PRN Vania De Paz,  PA-C        urea (GORMEL) 20 % cream CREA   Topical Daily Jose Hamilton MD   Given at 09/02/24 0933    Warfarin Dose Required Daily - Pharmacist Managed  1 each Oral See Admin Instructions Mariah Liz MD        wound support modular (EXPEDITE) bottle 60 mL  60 mL Oral Daily Orlando Villasenor, RD   60 mL at 09/02/24 0931        LABS:  INR   Date Value Ref Range Status   09/03/2024 2.88 (H) 0.85 - 1.15 Final      Hemoglobin   Date Value Ref Range Status   09/03/2024 8.9 (L) 13.3 - 17.7 g/dL Final     Platelet Count   Date Value Ref Range Status   09/03/2024 141 (L) 150 - 450 10e3/uL Final     Creatinine   Date Value Ref Range Status   09/03/2024 2.98 (H) 0.67 - 1.17 mg/dL Final     Potassium   Date Value Ref Range Status   09/03/2024 3.8 3.4 - 5.3 mmol/L Final   08/01/2022 2.9 (L) 3.4 - 5.3 mmol/L Final         EXAM:  BP 95/57 (BP Location: Right arm)   Pulse 104   Temp 97.5  F (36.4  C) (Axillary)   Resp 16   Wt 87.7 kg (193 lb 5.5 oz)   SpO2 100%   BMI 26.22 kg/m    Not assessed      ASSESSMENT/PLAN:    Recommend proceeding with LUE fistulogram with sedation as needed and possible intervention.    Keep NPO    Radiologist to further review procedure with patient.    EMR reviewed. No formal assessment completed.    Thank you for kindly for this consultation.     Total time spent on the date of the encounter: 20 minutes.      RABIA Fisher CNP  Interventional Radiology  [phone]

## 2024-09-03 NOTE — PROGRESS NOTES
[facility]    Medicine Progress Note - Hospitalist Service    Date of Admission:  8/28/2024  See problem list and ICU notes condition continues to be poor with general poor prognosis see renal notes regarding difficulties with dialysis and maintaining blood pressure   palliative care consultation has been requested these issues were broached with the son Duane who seems to have some insight and asked appropriate questions we will continue same plan of care until further direction he is still full code and that can be reviewed      Assessment & Plan   Hospital day number 6      Hypotension: originally given empiric for septic shock though  Without a source, off Levophed since yesterday;  Had a normal cortisol, is on outpatient midodrine, hydrocortisone   3 times daily continues  ESRD with dialysis: previous LUE fistula thrombosed; now  Access is right tunneled subclavian:  dialyzed yesterday with 3 L removed, plan to repeat today with gentle volume removed;  Patient to receive fistulogram earlier this week                Anemia of chronic disease: Hemoglobin 8  HIstory of atrial fibrillation; NSVT: amiodarone started during  This stay; changed from iv to oral; continue with  Metoprolol tartrate 25 mg twice daily ,may need ischemic eval at some point though  Is currently not stable enough;   Pleural effusion, right greater than left: CT placed on 8/28 with  1.8 liters exudative fluid; tube removed on 8/31 (due to  Pleural inflammation from blunt chest trauma);   DM2, noninsulin:    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  HTN: Metoprolol tartrate 25 mg twice daily  12.  GERD: Continue Protonix 40 mg twice  daily  13.  Leukocytosis: level was 9 on 8/30; now at 17; consider ID input  14.  DVT prevention: Coumadin per pharmacy  15.  Disposition:  Medically Ready for Discharge:  3-4 days          Diet: Snacks/Supplements Adult: Expedite Bottle; With Meals  NPO per Anesthesia Guidelines for Procedure/Surgery Except for: Meds    DVT Prophylaxis: Warfarin  Dasilva Catheter: Not present  Lines: PRESENT      CVC Triple Lumen Right Femoral-Site Assessment: WDL  Hemodialysis Vascular Access Right Subclavian-Site Assessment: WDL  Hemodialysis Vascular Access AV fistula Left Arm-Site Assessment: WDL;Bruit present;Thrill present      Cardiac Monitoring: ACTIVE order. Indication: Tachyarrhythmias, acute (48 hours)  Code Status: Full Code      Clinically Significant Risk Factors        # Hypokalemia: Lowest K = 3.3 mmol/L in last 2 days, will replace as needed       # Hypoalbuminemia: Lowest albumin = 2.5 g/dL at 8/30/2024  5:19 AM, will monitor as appropriate     # Hypertension: Noted on problem list  # Chronic heart failure with reduced ejection fraction: last echo with EF <40%          # Overweight: Estimated body mass index is 26.22 kg/m  as calculated from the following:    Height as of 8/27/24: 1.829 m (6').    Weight as of this encounter: 87.7 kg (193 lb 5.5 oz).   # Severe Malnutrition: based on nutrition assessment    # Financial/Environmental Concerns: none               Disposition Plan     Medically Ready for Discharge: Anticipated in 2-4 Days         Drake Ferrell MD  Hospitalist Service  [facility]  Securely message with [employer] (more info)  Text page via [employer] Paging/Directory   ______________________________________________________________________    Interval History    ; not much change all he wants today is to have his sling adjusted on his left arm no major pain issues somewhat confused    Physical Exam   Vital Signs: Temp: 97.5  F (36.4  C) Temp src: Oral BP: 102/56 Pulse: (!) 134   Resp:  14 SpO2: 100 % O2 Device: None (Room air) Oxygen Delivery: 2 LPM  Weight: 193 lbs 5.49 oz  Comfortable in ICU bed older than stated age chronically ill sling on left arm.   ----------------------------------------------------------------------------------------    Medical Decision Making       45 MINUTES SPENT BY ME on the date of service doing chart review, history, exam, documentation & further activities per the note.        Including discussion was very reasonable son and decision maker Haris Simpson
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
## 2024-09-03 NOTE — PLAN OF CARE
Problem: Adult Inpatient Plan of Care  Goal: Plan of Care Review  Description: The Plan of Care Review/Shift note should be completed every shift.  The Outcome Evaluation is a brief statement about your assessment that the patient is improving, declining, or no change.  This information will be displayed automatically on your shift  note.  Outcome: Progressing  Flowsheets (Taken 9/3/2024 1408)  Plan of Care Reviewed With:   patient   child     Problem: Adult Inpatient Plan of Care  Goal: Absence of Hospital-Acquired Illness or Injury  Intervention: Identify and Manage Fall Risk  Recent Flowsheet Documentation  Taken 9/3/2024 1200 by Nicol Pozo, RN  Safety Promotion/Fall Prevention: safety round/check completed  Taken 9/3/2024 0800 by Nicol Pozo, RN  Safety Promotion/Fall Prevention: safety round/check completed     Problem: Adult Inpatient Plan of Care  Goal: Absence of Hospital-Acquired Illness or Injury  Intervention: Prevent Skin Injury  Recent Flowsheet Documentation  Taken 9/3/2024 1230 by Nicol Pozo, RN  Body Position:   left   turned   upper extremity elevated  Taken 9/3/2024 1000 by Nicol Pozo RN  Body Position:   turned   right  Taken 9/3/2024 0810 by Nicol Pozo, RN  Body Position:   left   turned   upper extremity elevated     Problem: Adult Inpatient Plan of Care  Goal: Optimal Comfort and Wellbeing  Intervention: Provide Person-Centered Care  Recent Flowsheet Documentation  Taken 9/3/2024 1200 by Nicol Pozo, RN  Trust Relationship/Rapport: emotional support provided  Taken 9/3/2024 1000 by Nicol Pozo, RN  Trust Relationship/Rapport: emotional support provided  Taken 9/3/2024 0800 by Niclo Pozo, RN  Trust Relationship/Rapport: emotional support provided     Problem: Dysrhythmia  Goal: Normalized Cardiac Rhythm  Outcome: Not Progressing     Problem: Sepsis/Septic Shock  Goal: Blood Glucose Level Within Targeted Range  Outcome: Progressing     Problem: Sepsis/Septic Shock  Goal: Absence of  Infection Signs and Symptoms  Outcome: Progressing  Intervention: Promote Recovery  Recent Flowsheet Documentation  Taken 9/3/2024 1230 by Nicol Pozo, RN  Activity Management: activity adjusted per tolerance  Taken 9/3/2024 1200 by Nicol Pozo, RN  Activity Management: activity adjusted per tolerance  Taken 9/3/2024 0810 by Nicol Pozo RN  Activity Management: activity adjusted per tolerance  Taken 9/3/2024 0800 by Nicol Pozo RN  Activity Management: activity adjusted per tolerance     Problem: Sepsis/Septic Shock  Goal: Absence of Infection Signs and Symptoms  Outcome: Progressing     Problem: Wound  Goal: Skin Health and Integrity  Outcome: Progressing  Intervention: Optimize Skin Protection  Recent Flowsheet Documentation  Taken 9/3/2024 1230 by Nicol Pozo, RN  Activity Management: activity adjusted per tolerance  Head of Bed (HOB) Positioning: HOB at 20-30 degrees  Taken 9/3/2024 1200 by Nicol Pozo, RN  Activity Management: activity adjusted per tolerance  Taken 9/3/2024 1000 by Nicol Pozo RN  Head of Bed (HOB) Positioning: HOB at 20-30 degrees  Taken 9/3/2024 0810 by Nicol Pozo, CHIRAG  Activity Management: activity adjusted per tolerance  Head of Bed (HOB) Positioning: HOB at 20-30 degrees  Taken 9/3/2024 0800 by Nicol Pozo RN  Activity Management: activity adjusted per tolerance   Goal Outcome Evaluation:      Plan of Care Reviewed With: patient, child      Patient hr is still up and down. Mostly high hr 120-150's. Bp have been on he low side but MAP has bere above goal of 60. Pt alert to self and place at time. Wounds are stable maybe a little better. Edema still 3+ in bilateral legs and feet. 3+ in buttocks Meplex replaced on sacrum and coccyx. No pain. Turned patient very 2 hours side to side. Legs elevated. Remains on Room air.

## 2024-09-03 NOTE — DISCHARGE INSTRUCTIONS
Fistulogram Discharge Instructions:  You had a fistulogram (evaluation of your fistula/graft). A puncture was made into your fistula/graft and your fistula/graft and blood vessels were evaluated for narrowing and clot. Please follow these instructions as you recover:    Care Instructions:   - If you received sedation for your procedure, do not drive or operate heavy machinery for the rest of the day.  - Resume your normal activities as you tolerate.  - Remove dressing tomorrow.   - You may shower tomorrow.     Follow Up:  - Follow up with your nephrologists/dialysis unit for your dialysis plan.    Please seek medical evaluation for:  - Uncontrolled bleeding from puncture site.   - Fever (greater than 101 F (38.3C)).  - Purulent (yellow/green/foul smelling) drainage from puncture site.   - Increasing pain at puncture site.  - Increasing redness at puncture site.    WOC DISCHARGE INSTRUCTIONS:  Spine & coccyx  Soak wound with vashe moistened gauze for 2-5 minutes, pat dry  Apply cavilon no sting barrier film to periwound skin  Apply nickel thick layer of santyl ointment to wound  Cover with xeroform, secure with abd pad and tape to spine and sacral mepilex to coccyx  Change dressing daily + PRN if soiled, saturated, falls off

## 2024-09-03 NOTE — PROGRESS NOTES
Gave 5mg IV metoprolol for hr greater than 160 with bp 102/62. Hr I 120-130 now. Turned pt onto left side as ultrasound unable to do ultrasound of left arm tonight.

## 2024-09-03 NOTE — PROGRESS NOTES
RENAL PROGRESS NOTE    PLAN:   Truncated  dialysis run yesterday (vtach, occurring even off HD)  NO urgent need for addtl dialysis today, has procedures schedule and resp status/lytes at least stable,  next run tomorrow on schedule,   AVG US today (concern in IR to do fistulogram d/t positioning required and vtach runs), getting US instead-->discussed with DR Killian, if graft is patent consider attempt to use AVG (in order to get his CVC removed  Repeat blood cx pending (ongoing leukocytosis) , if successful graft access, would rec getting CVC removed soon.       ASSESSMENT   ESRD on HD  ESRD due to membranous nephropathy he had remotely been treated with rituximab.  Runs now at [facility] under Cares of Dr Helm, recently established  MWF 3.5 TT, K3 bath, EDW 81kg   S/P Thrombectomy 8/12/24 at [facility] , this arm access has not been utilized since this time.    Has CVC, placed 8/14 following fracture of arm     Access:   L AVG creation 6/2022 (by Dr Rebolledo, [place]?)   Unclear functional status of AVG, throbectomy 8/12 ([facility]) and not cannulated since this time, utilizing cvc.  Plan for fistulagram early this week  Orders in for IR for fistulagram  If access patent, OK to use if able in sling   OK to remove CVC if we are able to use his AVG  successfully      Hypokalemia  Liberalize diet, high K dialysate as needed.      Hypervolemia  Poor HD runs outpt due to hypotension  Not overtly overloaded, but if wt is accurate, >6kg above target (exam not convincing of this)       MBD-CKD -   phos at goal,  very poor intake, calcium with correction lower end of normal, not on binders     HTN -   Variable BP, on scheduled oral pressors , back on Metop for attempt at rate control.          DM2 - no noted diabetic medications. Hypoglycemia noted, ER/hospital following.       Afib / Vtach -   metoprolol, amiodarone infusion   Cardiology following     HLD- statin     Thyroid disease -  "  levothyroxine   TSH very high, ? Taking medication as Rx PTA       SUBJECTIVE:  pt is new to me, MMP, trouble with HD yesterday d/t RVR, some HOTN, tx aborted after only 45\", holding off on tx today with planned fistulogrma and RVR again this a.m.with rates 150's, awaiting cardiac input, any other options here aside from amio and Metop.  He is on scheduled Midodrine TID and off IV pressors today.  Wt is up from target is accurate, though he is not overtly overloaded by exam.  Discussed with pts RN, IR to see and do fistulagram of his L arm graft, if patent, consider attempting to use AVG tomorrow and get CVC out. (Addm:  discussed with Dr Killian, IR.  Willl get graft US today, if patent, decent flow, skip fistulgram and attempt to use AVG tomorrow as above).      OBJECTIVE:  Physical Exam   Temp: 98.5  F (36.9  C) Temp src: Oral BP: 102/55 Pulse: 104   Resp: 16 SpO2: 100 % O2 Device: None (Room air) Oxygen Delivery: 2 LPM  Vitals:    24 1400 24 0041 24 0612   Weight: 86.5 kg (190 lb 11.2 oz) 86.5 kg (190 lb 11.2 oz) 87.7 kg (193 lb 5.5 oz)     Vital Signs with Ranges  Temp:  [97.5  F (36.4  C)-98.5  F (36.9  C)] 98.5  F (36.9  C)  Pulse:  [] 104  Resp:  [12-28] 16  BP: ()/(51-77) 102/55  SpO2:  [83 %-100 %] 100 %  I/O last 3 completed shifts:  In: 780 [P.O.:780]  Out: 50 [Urine:50]    Temp (24hrs), Av.9  F (36.6  C), Min:97.5  F (36.4  C), Max:98.5  F (36.9  C)      Patient Vitals for the past 72 hrs:   Weight   24 0612 87.7 kg (193 lb 5.5 oz)   24 0041 86.5 kg (190 lb 11.2 oz)   24 1400 86.5 kg (190 lb 11.2 oz)   24 1237 88 kg (194 lb 0.1 oz)     Intake/Output Summary (Last 24 hours) at 9/3/2024 0912  Last data filed at 9/3/2024 0400  Gross per 24 hour   Intake 660 ml   Output 50 ml   Net 610 ml       PHYSICAL EXAM:  General - Alert and oriented x3, appears comfortable, NAD, staff in room,   Cardiovascular - rate 150's, runs of v-tach, afib  Respiratory " - on 1L per NC, dim  Abd:soft   Extremities - No sig lower extremity edema bilaterally  Skin: dry, intact, no rash, good turgor  Neuro:  Grossly intact, no focal deficits  MSK:  Grossly intact  Psych:  flat affect   Scant UO at baseline.   Wt 87 kg ? Accurate bed wt (target is 81 kg PTA at dialysis).     LABORATORY STUDIES:     Recent Labs   Lab 09/03/24  0340 09/02/24  0517 09/01/24  0857 08/30/24  1206 08/30/24  0519 08/30/24  0109 08/29/24  1818 08/29/24  1336 08/29/24  1257   WBC 17.8* 16.4* 17.5*  --  8.9  --   --  10.2 10.4   RBC 2.70* 2.43* 2.58*  --  2.37*  --   --  2.22* 2.13*   HGB 8.9* 8.0* 8.6* 8.6* 7.8*  7.8* 8.1*   < > 7.2* 7.0*   HCT 26.3* 23.8* 25.2*  --  22.9*  --   --  21.5* 20.8*   * 156 188  --  234  --   --  328 318    < > = values in this interval not displayed.       Basic Metabolic Panel:  Recent Labs   Lab 09/03/24  0340 09/02/24  2131 09/02/24  1206 09/02/24  0953 09/02/24  0759 09/02/24  0517 09/01/24  1349 09/01/24  1002 09/01/24  0525 08/31/24  1202 08/31/24  0541 08/30/24  1646 08/30/24  1640 08/30/24  0846 08/30/24  0519 08/29/24  1647 08/29/24  1257 08/29/24  1027 08/29/24  0606     --   --   --   --  135  --   --  138  --   --   --   --   --  133*  --  135  --  131*   POTASSIUM 3.8  --   --  3.5  --  3.3*  --   --  3.6  3.6  --  4.0  --  3.8   < > 3.4  --  3.4   < > 2.8*  2.8*   CHLORIDE 99  --   --   --   --  99  --   --  99  --   --   --   --   --  98  --  94*  --  93*   CO2 21*  --   --   --   --  22  --   --  22  --   --   --   --   --  25  --  25  --  26   BUN 41.1*  --   --   --   --  36.5*  --   --  25.1*  --   --   --   --   --  24.4*  --  38.9*  --  38.0*   CR 2.98*  --   --   --   --  2.95*  --   --  2.44*  --   --   --   --   --  2.77*  --  3.92*  --  3.96*   GLC 86 90 95  --  79 85 116*   < > 126*   < > 174*   < >  --    < > 189*   < > 121*   < > 87  87   MELISSA 7.8*  --   --   --   --  8.0*  --   --  8.1*  --   --   --   --   --  7.7*  --  6.7*  --  7.5*     < > = values in this interval not displayed.       INR  Recent Labs   Lab 09/03/24  0340 09/02/24  0517 09/01/24  0525 08/31/24  0541   INR 2.88* 2.40* 1.66* 1.29*        Recent Labs   Lab Test 09/03/24  0340 09/02/24  0517   INR 2.88* 2.40*   WBC 17.8* 16.4*   HGB 8.9* 8.0*   * 156       Personally reviewed current labs      Jesusita Leger, [facility]  [facility]  [phone]

## 2024-09-03 NOTE — CARE PLAN
Shift Events:     PRN Metoprolol given for HR >140 (See MAR).     Assessment:    Neuro: A/Ox4, follows commands    Respiratory: RA    Cardiovascular: Tele Afib RVR    GI/: Oliguric. Diet: NPO since 0000. X2 Loose BM this shift.    Lines/Drains: R) Femoral ART line, R) tunneled dialysis catheter    Pain: Denied pain throughout shift      Problem: Dysrhythmia  Goal: Normalized Cardiac Rhythm  Outcome: Not Progressing  Intervention: Monitor and Manage Cardiac Rhythm Effect  Recent Flowsheet Documentation  Taken 9/3/2024 0400 by Ashwini Patrick RN  VTE Prevention/Management:   SCDs off (sequential compression devices)   patient refused intervention  Taken 9/3/2024 0000 by Ashwini Patrick RN  VTE Prevention/Management:   SCDs off (sequential compression devices)   patient refused intervention  Taken 9/2/2024 2000 by Ashwini Patrick RN  VTE Prevention/Management:   SCDs off (sequential compression devices)   patient refused intervention     Problem: Risk for Delirium  Goal: Improved Sleep  Outcome: Progressing     Problem: Sepsis/Septic Shock  Goal: Blood Glucose Level Within Targeted Range  Outcome: Progressing  Goal: Absence of Infection Signs and Symptoms  Outcome: Progressing  Intervention: Promote Recovery  Recent Flowsheet Documentation  Taken 9/3/2024 0400 by Ashwini Patrick RN  Activity Management: activity adjusted per tolerance  Taken 9/3/2024 0200 by Ashwini Patrick RN  Activity Management: activity adjusted per tolerance  Taken 9/3/2024 0000 by Ashwini Patrick RN  Activity Management: activity adjusted per tolerance  Taken 9/2/2024 2000 by Ashwini Patrick RN  Activity Management: activity adjusted per tolerance     Problem: Wound  Goal: Skin Health and Integrity  Outcome: Progressing  Intervention: Optimize Skin Protection  Recent Flowsheet Documentation  Taken 9/3/2024 0400 by Ashwini Patrick RN  Activity Management: activity adjusted per tolerance  Head of Bed (HOB) Positioning: HOB at 20-30 degrees  Taken  9/3/2024 0200 by Ashwini Patrick RN  Activity Management: activity adjusted per tolerance  Head of Bed (HOB) Positioning: HOB at 20-30 degrees  Taken 9/3/2024 0000 by Ashwini Patrick RN  Activity Management: activity adjusted per tolerance  Head of Bed (HOB) Positioning: HOB at 20-30 degrees  Taken 9/2/2024 2200 by Ashwini Patrick RN  Head of Bed (HOB) Positioning: HOB at 20-30 degrees  Taken 9/2/2024 2000 by Ashwini Patrick RN  Activity Management: activity adjusted per tolerance  Goal: Optimal Wound Healing  Outcome: Progressing     Problem: Hemodialysis  Goal: Safe, Effective Therapy Delivery  Outcome: Progressing  Intervention: Optimize Device Care and Function  Recent Flowsheet Documentation  Taken 9/3/2024 0400 by Ashwini Patrick RN  Medication Review/Management: medications reviewed  Taken 9/3/2024 0000 by Ashwini Patrick RN  Medication Review/Management: medications reviewed  Taken 9/2/2024 2200 by Ashwini Patrick RN  Medication Review/Management: medications reviewed  Taken 9/2/2024 2000 by Ashwini Patrick RN  Medication Review/Management: medications reviewed   Goal Outcome Evaluation:

## 2024-09-03 NOTE — PROGRESS NOTES
[facility] Nurse Inpatient Assessment     Consulted for: pressure wounds on back    Summary: 8/29: Unable to visualize wounds today, patient on dialysis and levo - bedside nurse stating blood pressure dips drastically with turning at this time.     Patient History (according to provider note(s):      8/28/2024 12:02 PM     CCx:Septic shock     HPI:Mr. Polk is a 74 year old gentleman with a past medical history significant for history of gastric bypass, short gut syndrome, chronic diarrhea, end-stage renal disease who was previously dialyzing via left upper extremity AV fistula now through a right tunneled subclavian catheter following thrombosis of graft after recent fall, hypertension, paroxysmal atrial fibrillation on anticoagulation, type 2 diabetes amongst other medical issues who presents to the hospital for the aforementioned chief complaint.  His recent medical history is significant for a mechanical fall when he presented to [facility] with subarachnoid hemorrhage, sternal fractures and rib fractures, left-sided distal humeral fracture, left patellar fracture and a right sixth to eighth rib fractures.  He had been discharged to [facility] for rehab on 8/16 and was noted to be somnolent and hypotensive with systolic blood pressures in the 50s yesterday.  On arrival to the emergency department yesterday, he was noted to be hypoglycemic requiring 2 A of D50 and subsequently initiated on D10 infusion.  He was also noted to be in atrial fibrillation with RVR and was administered metoprolol after which an amiodarone infusion was initiated.  He was noted to have lactic acidosis, supratherapeutic INR of 5, hypokalemia mild leukocytosis, anemia and a mild troponin elevation in the absence of acute ST-T changes.  Use noted on exam to have a long T-spine pressure ulcer that appeared to be infected and thought to be the etiology of his present sepsis.  Blood cultures were  drawn and he was initiated on broad-spectrum antibiotics.  Notably, overnight he continued to be hypotensive and did not have improvement of blood pressures with initiation of fluids because of which norepinephrine infusion was initiated.     Recent medical history:  As noted above, he was recently admitted to [facility] after mechanical fall that was not preceded by any dizziness or loss of consciousness.  He was noted to have a small subarachnoid hemorrhage, sternal fractures right 6-8 rib fractures, left patellar fracture left distal humeral fracture.  His INR was noted to be 7 at the time of admission with a hemoglobin of 6.4 and he was treated with Kcentra and administered packed red blood cells.  During the same admission it was noted that his left upper extremity fistula developed a thrombus and he underwent thrombectomy on 8/12.  He also underwent an ORIF left patellar fracture and left distal humerus fracture on the same day.  Neurosurgery was consulted but felt that his subarachnoid hemorrhage could be manage conservatively.  Is also noted that he likely had digoxin toxicity during that hospitalization and this was subsequently discontinued.    Assessment:      Pressure Injury Location: spine      8/29                                                                      9/3    Last photo: 9/3  Wound type: Pressure Injury     Pressure Injury Stage: Unstageable, present on admission     Wound history/plan of care:   bedside nurse reported that the patient stated he was not turned all night at TCU    Wound base: 50 % Eschar, 50% slough     Palpation of the wound bed:  BRIAN       Drainage: scant     Description of drainage: serosanguinous and liquifying slough     Measurements (length x width x depth, in cm) 11 x 3 x 0cm      Tunneling N/A     Undermining N/A  Periwound skin: Erythema- blanchable      Color: dusky, pink, and red      Temperature: unknown  Odor: none  Pain: mild  Pain intervention prior  to dressing change: slow and gentle cares   Treatment goal: Infection control/prevention, Protection, Remove necrotic tissue, and Soften necrotic tissue  STATUS: initial assessment  Supplies ordered: at bedside  _______________________________________________________________________________________________________________________________________________________________________________________________________________________________________________    Pressure Injury Location: coccyx      8/29                                                                     9/3    Last photo: 9/3  Wound type: Pressure Injury     Pressure Injury Stage: 3, present on admission     Wound history/plan of care:   jaun as above    Wound base: 50 % Slough, 50 % Epidermis     Palpation of the wound bed: normal      Drainage: scant     Description of drainage: serosanguinous      Measurements (length x width x depth, in cm) 4.5 x 1.2 x 0.3cm     Tunneling N/A     Undermining N/A  Periwound skin: Erythema- blanchable      Color: dusky and pink      Temperature: unknown  Odor: none  Pain: mild  Pain intervention prior to dressing change: slow and gentle cares   Treatment goal: Protection and Promote epidermal migration  STATUS: initial assessment  Supplies ordered: at bedside    My PI Risk Assessment     Sensory Perception: 3 - Slightly Limited     Moisture: 2 - Very moist      Activity: 1 - Bedfast      Mobility: 2 - Very limited     Nutrition: 2 - Probably inadequate      Friction/Shear: 2 - Potential problem      TOTAL: 12       Treatment Plan:     Spine & coccyx  Soak wound with vashe moistened gauze for 2-5 minutes, pat dry  Apply cavilon no sting barrier film to periwound skin  Apply nickel thick layer of santyl ointment to wound  Cover with xeroform, secure with abd pad and tape to spine and sacral mepilex to coccyx  Change dressing daily + PRN if soiled, saturated, falls off    Pressure Injury Prevention (PIP) Plan:  If patient is  "declining pressure injury prevention interventions: Explore reason why and address patient's concerns, Educate on pressure injury risk and prevention intervention(s), If patient is still declining, document \"informed refusal\" , and Ensure Care team is aware ( provider, charge nurse, etc)  HOB: Maintain at or below 30 degrees, unless contraindicated  Repositioning in bed: Every 1-2 hours , Left/right positioning; avoid supine, Raise foot of bed prior to raising head of bed, to reduce patient sliding down (shear), and Frequent microturns using positioning wedges, as patient tolerates  Heels: Keep elevated off mattress and Pillows under calves  Protective Dressing: Sacral Mepilex for prevention (#458089),  especially for the agitated patient   Positioning Equipment:Positioning wedges (#070097) to help maintain 30 degree side lying position   Chair positioning: Chair cushion (#[id number])    If patient has a buttock pressure injury, or high risk for PI use chair cushion or SPS.  Moisture Management: Perineal cleansing /protection: Follow Incontinence Protocol, Avoid brief in bed, Clean and dry skin folds with bathing , Consider InterDry (#198531) between folds, and Moisturize dry skin  Under Devices: Inspect skin under all medical devices during skin inspection , Ensure tubes are stabilized without tension, and Ensure patient is not lying on medical devices or equipment when repositioned  Ask provider to discontinue device when no longer needed.          Orders: Written    RECOMMEND PRIMARY TEAM ORDER: None, at this time  Education provided: importance of repositioning, plan of care, and Off-loading pressure  Discussed plan of care with: Nurse  WOC nurse follow-up plan: weekly  Notify WOC if wound(s) deteriorate.  Nursing to notify the Provider(s) and re-consult the WOC Nurse if new skin concern.    DATA:     Current support surface: Standard  Low air loss (MAJO pump, Isolibrium, Pulsate)  Containment of urine/stool: " Incontinence Protocol  BMI: Body mass index is 26.22 kg/m .   Active diet order: Orders Placed This Encounter      NPO per Anesthesia Guidelines for Procedure/Surgery Except for: Meds     Output: I/O last 3 completed shifts:  In: 280 [P.O.:180; I.V.:100]  Out: 50 [Urine:50]     Labs:   Recent Labs   Lab 09/03/24  0340 08/30/24  1206 08/30/24  0519   ALBUMIN  --   --  2.5*   HGB 8.9*   < > 7.8*  7.8*   INR 2.88*   < > 1.41*   WBC 17.8*   < > 8.9    < > = values in this interval not displayed.     Pressure injury risk assessment:   Sensory Perception: 4-->no impairment  Moisture: 3-->occasionally moist  Activity: 2-->chairfast  Mobility: 3-->slightly limited  Nutrition: 2-->probably inadequate  Friction and Shear: 2-->potential problem  Aristides Score: 16    KHUSHI SteeleN RN CWOCN  Pager no longer in use, please contact through [employer] group: [facility] [employer] [facility]

## 2024-09-03 NOTE — PROGRESS NOTES
HEMODIALYSIS TREATMENT NOTE    Date: 9/2/2024  Time: 7:06 PM    Data:  Weight change: 0 kg  Ultrafiltration - Post Run Net Total Removed (mL): 0 mL  Vascular Access Site: patent   Dialyzer Rinse: Light, Streaked  Total Blood Volume Processed: 18 L Liters  Total Dialysis (Treatment) Time: 47 min Hours  Dialysate Bath: K 3, Ca 3  Heparin: Heparin: None    Lab:   No    Interventions:  Dialysis done through: Right catheter  UF set to 0.3 Liters of fluid removal, accommodating priming and rinse back volumes  BFR: Blood Flow Rate (BFR): 350-400 mL/min  DFR: Potassium/Calcium Rate: 600 mL/min  Albumin 25% administered per MAR for BP support  Tx initiated without problems.   Pt is in afib at baseline, HR increased to 140's-150's sustained  100 ml Saline bolus admin without effect, Vtach run noted on monitor, tx stopped immediately, pt was rinsed back successfully, ICU RN at bedside, Vania OLEA notified, ordered to end HD for today due to increased  hemodynamic instability with HD,   Catheter lumens locked with saline, cath guards changed post HD  Report given to Jed RN at bedside, pt remains in ICU under continuous monitoring.    Assessment:  A/O x 3, calm & cooperative, denies pain  Right CVC intact, dressing CDI.     Plan:    Per Renal team

## 2024-09-03 NOTE — PROGRESS NOTES
CLINICAL NUTRITION SERVICES - REASSESSMENT NOTE     Nutrition Prescription    RECOMMENDATIONS FOR MDs/PROVIDERS TO ORDER:  none    Malnutrition Status:    Severe malnutrition previously noted     Recommendations already ordered by Registered Dietitian (RD):  Discontinue Natalia Farms, pt does not want another nutrition supplement option   Continue with Expedite and Greek Yogurt, will need to monitor phosphorous     Future/Additional Recommendations:  Will monitor progress towards goals      EVALUATION OF THE PROGRESS TOWARD GOALS   Diet: NPO started today was on a renal dialysis diet with expedite daily and natalia farms bid and vanilla greek yogurt tid   Intake: Pt ordered 1927kcal and 112g protein with variable intake from % on 9/2     NEW FINDINGS   Pt does not like [employer] Farms, feels it gives him diarrhea. He does like the Expedite.   He also likes the greek yogurt, He is aware this is not the best option for a dialysis pt but needs to prioritize calories     ANTHROPOMETRICS  09/03/24 0612 87.7 kg (193 lb 5.5 oz) Bed scale   09/02/24 0041 86.5 kg (190 lb 11.2 oz) Bed scale   09/01/24 1400 86.5 kg (190 lb 11.2 oz) --   08/31/24 1237 88 kg (194 lb 0.1 oz) Bed scale     PHYSICAL FINDINGS  Per flowsheet:   Edema- +2 generalized, +2 BLE, +3 bilateral hip, +3 BLE   GI- WDL  Skin- back friction injury, elbow friction injury, sacrum pressure wound- no updates     LABS  Reviewed BUN-41.1, Cr-2.98    MEDICATIONS  Reviewed synthroid, miralax    MALNUTRITION:  Severe malnutrition previously noted     NUTRITION DIAGNOSIS  Malnutrition related to poor appetite, altered GI function as evidenced by >5% wt loss in 1 month, <75% intake for >1 month, fat and muscle losses, moderate edema   Evaluation: ongoing    Previous Goals  - Total avg nutritional intake to meet a minimum of 25 kcal/kg and 1.5 g PRO/kg daily (per dosing wt 76.2 kg). - progressing  - Maintain wt >76.2 - wt is up  - Electrolytes WNL -mg wnl, no new PO4      Goals  - Total avg nutritional intake to meet a minimum of 25 kcal/kg and 1.5 g PRO/kg daily (per dosing wt 76.2 kg). -   - Maintain wt >76.2 -   - Electrolytes WNL     INTERVENTIONS  Implementation  Discontinue Nat Brodie, pt does not want another nutrition supplement option   Continue with Expedite and Greek Yogurt, will need to monitor phosphorous       Monitoring/Evaluation  Progress toward goals will be monitored and evaluated per protocol.

## 2024-09-03 NOTE — PROGRESS NOTES
Reached out to Dr Brown cardiology about Pt heart rate of 140-160's. MD stated aware of hr and low bp. Stated can't order anything and recheck bp and give when can.

## 2024-09-03 NOTE — CONSULTS
"Palliative Care Consultation Note  [facility]      Patient: Ijeoma Polk  Date of Admission:  8/28/2024    Requesting Clinician / Team: Dr. Ferrell  Reason for consult: \"end stage problems  including ESRD, full code---- review of goals of care and options with family appreciated.\"       Recommendations & Counseling     GOALS OF CARE:   Life-prolonging with limits  - DNR/I  Continue current cares and treatments.  Spoke with patient's son, Duane, and daughter-in-law, Sammie this afternoon who indicate desires to continue with current medical treatments.  Understand that patient has multiple end-stage problems and that prognosis is guarded.  Reviewed with them what stopping dialysis would look like, that it is not painful and that he would be looking at short days to weeks of life.  Family plan on visiting with patient later this evening to discuss further.  Discussed CODE STATUS with them and they confirmed that patient has been no CPR/no intubation order placed to reflect this.    ADVANCE CARE PLANNING:  Patient has an advance directive dated 6/7/2022.  Primary Health Care Agent son, Duane Brauch.  Alternate(s) daughter-in-law, Trip Polk.   No POLST on file.  Code status: No CPR- Do NOT Intubate    MEDICAL MANAGEMENT:   We are not actively managing symptoms at this time.    PSYCHOSOCIAL/SPIRITUAL SUPPORT:  Family son, JESSY Avila Hillorie, 4 grandchildren 22,17,15,13  [facility]: [facility]     Palliative Care will continue to follow see patient again on Thursday, 9/5/2024. Thank you for the consult and allowing us to aid in the care of Ijeoma Polk.    These recommendations have been discussed with team.    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  Recently hospitalized at [facility] after sustaining a mechanical fall and found to have rib fractures and a sternal fracture, pulmonary nodules and rectal thickening with mild outpatient follow-up but required OR for thrombectomy of AV fistula.  Patient who presented on 8/28/2024 outpatient clinic with increased somnolence and hypotension.  Started with broad-spectrum antibiotics, cefepime and vancomycin.  Patient has had multiuse since admission with RVR and not tolerating dialysis because of this and low blood pressure.  Patient is also noted to have sores throughout the thoracic spine and sacrum, has ecchymosis on face, intermittent atrial fibrillation and diminished breath sounds with bilateral pleural effusions.    Today, the patient was seen for:  Introduction to palliative medicine specialty, establish rapport, initial goals of care discussion    History of Present Illness   Met with patient very briefly just to review systems and more lengthy time spent with sonAustin, and daughter in Trip patle.   Palliative care team helps patients and families dealing with serious, potentially life-limiting illnesses, navigate their care while focusing on the whole person; providing emotional, social and spiritual support.  Palliative care often assists with symptom management, information sharing about what to expect from the illness, available treatment options and what effect those options may have on the disease course, and provide effective communication and caring support.    Prognosis, Goals, & Planning:   Functional Status just prior to this current  hospitalization:  ECOG3 (Capable of only limited self-care; needs help with ADLs; in bed/chair >50% of waking hours)    Prognosis, Goals, and/or Advance Care Planning:  Reviewed patient's current medical condition and treatments with them.  Answered questions about multiple medical problems and difficulty tolerating dialysis due to elevated heart rate and hypotension.  Reviewed current medications being utilized including metoprolol and amiodarone to help with rate control.  Reviewed with them present concern is to stabilize his heart rate, evaluate his fistula and determine if he will be able to tolerate dialysis tomorrow.  Son reports that patient told him that he felt like he was dying yesterday.  His son expresses concern that his father is declining and declining quickly.  Anticipatory guidance provided to them about what his experience would be if dialysis was not tolerated and needed to be discontinued or was decided to be discontinued and transition to comfort.  Reviewed what comfort cares including what it looks like.  Discussed that individuals on hemodialysis do not experience discomfort from discontinuing dialysis that they become more fatigued, sleep more and sleep until they pass.  Discussed life expectancy in  terms of 7 to 14 days in most cases when dialysis stops.  Plan to visit with patient this evening and talk with him more about what his wishes would be.  Son feels that his father may put decision-making to him.  Support provided to him today.     Code Status was addressed today:   Son and daughter-in-law both articulate that patient's wishes have always been to be a DO NOT RESUSCITATE and DO NOT INTUBATE.  They wish this to be communicated with the medical team.  Order placed to reflect this.    Patient's decision making preferences: shared with support from loved ones        Patient has decision-making capacity today for complex decisions: Questionable          Coping, Meaning, & Spirituality:    Mood, coping, and/or meaning in the context of serious illness were addressed today: Yes    Social:   Important relationships/caregivers:sonAustin and DIL Trip, 4 grand children    Medications:  Reviewed this patient's medication profile and medications from this hospitalization.   [place] Board of Pharmacy Data Base Reviewed: Yes:   reviewed - controlled substances prescribed by other outside provider(s).    ROS:  Comprehensive ROS is reviewed and is negative except as here & per HPI:     Physical Exam   Vital Signs with Ranges  Temp:  [97.5  F (36.4  C)-98.5  F (36.9  C)] 97.5  F (36.4  C)  Pulse:  [] 134  Resp:  [12-28] 14  BP: ()/(51-77) 102/56  SpO2:  [83 %-100 %] 100 %  Wt Readings from Last 10 Encounters:   09/03/24 87.7 kg (193 lb 5.5 oz)   08/27/24 76.2 kg (168 lb)   08/21/24 80.3 kg (177 lb)   08/18/24 80.3 kg (177 lb)   08/16/24 80.3 kg (177 lb)   08/01/22 80.7 kg (177 lb 14.4 oz)   07/13/22 81 kg (178 lb 9.2 oz)     193 lbs 5.49 oz    PHYSICAL EXAM:  Constitutional: no distress and fatigued  Cardiovascular: Tachycardic with apical rate 128  Respiratory: negative findings: normal respiratory rate and rhythm  Head: Cephalic, atraumatic  Abdomen:, Nontender, positive bowel sounds  NEURO: Answered questions appropriately.  Fatigued.  Oriented x 3    Data reviewed:  Results for orders placed or performed during the hospital encounter of 08/28/24 (from the past 24 hour(s))   Glucose by meter   Result Value Ref Range    GLUCOSE BY METER POCT 90 70 - 99 mg/dL   INR   Result Value Ref Range    INR 2.88 (H) 0.85 - 1.15   CBC with platelets   Result Value Ref Range    WBC Count 17.8 (H) 4.0 - 11.0 10e3/uL    RBC Count 2.70 (L) 4.40 - 5.90 10e6/uL    Hemoglobin 8.9 (L) 13.3 - 17.7 g/dL    Hematocrit 26.3 (L) 40.0 - 53.0 %    MCV 97 78 - 100 fL    MCH 33.0 26.5 - 33.0 pg    MCHC 33.8 31.5 - 36.5 g/dL    RDW 19.0 (H) 10.0 - 15.0 %    Platelet Count 141 (L) 150 - 450 10e3/uL   Magnesium    Result Value Ref Range    Magnesium 2.2 1.7 - 2.3 mg/dL   Basic metabolic panel   Result Value Ref Range    Sodium 138 135 - 145 mmol/L    Potassium 3.8 3.4 - 5.3 mmol/L    Chloride 99 98 - 107 mmol/L    Carbon Dioxide (CO2) 21 (L) 22 - 29 mmol/L    Anion Gap 18 (H) 7 - 15 mmol/L    Urea Nitrogen 41.1 (H) 8.0 - 23.0 mg/dL    Creatinine 2.98 (H) 0.67 - 1.17 mg/dL    GFR Estimate 21 (L) >60 mL/min/1.73m2    Calcium 7.8 (L) 8.8 - 10.4 mg/dL    Glucose 86 70 - 99 mg/dL   CRP inflammation   Result Value Ref Range    CRP Inflammation 44.60 (H) <5.00 mg/L   XR Chest Port 1 View    Narrative    EXAM: XR CHEST PORT 1 VIEW  LOCATION: [facility]  DATE: 9/3/2024    INDICATION: Worsening leukocytosis.  COMPARISON: 8/30/2024      Impression    IMPRESSION: Central line tip in the low SVC right atrial junction stable since previous. Stable small left pleural effusion. Stable mild interstitial prominence in the hilum could represent some edema. The previous right chest tube over the lung bases no   longer present. No significant change.   Glucose by meter   Result Value Ref Range    GLUCOSE BY METER POCT 74 70 - 99 mg/dL     70 MINUTES SPENT BY ME on the date of service doing chart review, history, exam, documentation & further activities per the note.

## 2024-09-04 NOTE — PROGRESS NOTES
"Called to assess pt d/t acute hypotenion and again a fib with RVR.  Pt awake and states \"nothing hurts too bad\".  Intensivist doctor completing bedside ECHO  Will start vasopressor agents, IV amiodarone bolus and gtt  Call out to cardiology  Spoke with Nephrology present in room.  He will currently be unable to tolerate scheduled dialysis.  Spoke with son, Duane.  Nephrology discussed with Duane and updated him.  Also questions answered.    Family will discuss plan of care together and call back ICU  Continue current medical treatment at this time.     Cardiology states that retrial of IV digoxin a possibility - noted to have toxicity in the past.  "
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
## 2024-09-04 NOTE — DISCHARGE SUMMARY
Death Summary:    Diagnosis:    Septic shock, likely secondary to infected skin wounds   2.   Paroxysmal a fib with RVR  3.   ESRD on hemodialysis  4.   Acute metabolic encephalopathy  5.   Bilateral pleural effusions - s/p chest tube placement  6.   Recent traumatic fall with multiple fractures 8/9/24  7.   DM  8.   Anemia of chronic disease  9.   Abnormal chest imaging, possible superimposed pneumonia    Summary of stay:    75 yo male with pt PMH of ESRD on hemodialysis who presented to the ED with hypotension and lethargy.  Initially met criteria for early sepsis on presentation with presumed infectious source infected skin wounds.   Noted to have a fib with RVR; required IV metoprolol doses and transition to IV/po amiodarone.  Initially also required intermittent vasopressor agents to maintain normotensive state.      Noted to have recently been admitted in [place], [place] 8/9/24 after sustaining a mechanical fall in which he sustained multiple fractures.  He underwent ORIF of left distal humerus and left patellar fracture on 9/12/24 before he was discharged from the hospital.     He was noted to have bilateral pleural effusions (R>L). Chest tube placed on the right side 8/28-8/31/24.      Nephrology consulted.  Recently had 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/24 at [facility] and then CVC placed 8/14/24 following fracture of left arm.  Fistulogram was planned prior to his clinical decompensation.  He had poor HD runs d/t hypotension.  Due to his progressing clinical status with septic shock, not felt to be a candidate for ongoing hemodialysis treatments 9/4/24.    He developed progressive clinical s/s of septic shock and worsening leukocytosis despite treatment with IV cefepime.  Palliative care was consulted and involved in goals of care discussions with patient and his family 9/3/24.      He developed worsening hypotension and tachyarrythmia on am of day of discharge.  IV vasopressors and IV amiodarone bolus and gtt  initiated.  He continued to clinically decline despite these efforts and was transitioned to comfort care goals.      Time of death:  1247pm     was notified due to recent trauma.

## 2024-09-04 NOTE — PLAN OF CARE
Problem: Dysrhythmia  Goal: Normalized Cardiac Rhythm  Outcome: Progressing   Goal Outcome Evaluation:  (See earlier note). Pt is sleepy but alert when awake. HR not much improved after metoprolol, continues to  be 120-160's with frequent abherrant bursts.  BP improved after midodrin although difficult to obtain and many attempts before getting a reading. All drsgs CDI. On Room air, sats >95%. CMS intact to left extremities although cool to touch. Denies pain and sleeps well between cares. Incontinent of large loose BM. Call light in place to make needs known.

## 2024-09-04 NOTE — PROGRESS NOTES
Spoke with palliative care service on-call and updated them on phone regarding pt's clinical status.     Met with son, Duane, and his wife at bedside.  Updated them on clinical status.  Ijeoma is min responsive now and having some guarded respiration.  BP hard to obtain via RN with cuff.      Explained to family present that he is actively dying.  They will be calling [facility] to update.  I have encouraged them to have them talk to them on the phone so that they can hear their voice even if he cannot respond.      Agreeable to some small doses of IV dilaudid now to ease any SOB symptoms; he has tolerated this medication in the past without side effects.  Would like to continue current drips at this time until one additonal family member can come to the hospital around 1400; but not to escalate cares or drips further.      An additional 40 min spent

## 2024-09-04 NOTE — PROVIDER NOTIFICATION
"0750: Writing RN at bedside with NA.  Unable to get BP reading.  Attempted multiple RUE and BLE Bps with no success. Unable to get manual BP.  Additional ICU RN at bedside and attempted manual.  Rough reading was \"maybe 60s/30s\".  Patient asymptomatic, although lethargic.  HR 160s.  MD and cardiology paged.  MD now at bedside.  Writer discussed with intensivist, who initiated Ellis and Amio bolus following by gtt.  Writer spoke to cards via phone, who will see patient this morning.  MD called and spoke to son Jose re: current situation.  Neph at bedside and also spoke to son.  Plans for son to come to bedside, possibly with grandchildren, and will most likely transition to comfort measures at that time.  Currently titrating Ellis to maintain MAP >60. Marian Meyer, RN  "

## 2024-09-04 NOTE — PROGRESS NOTES
Afib RVR ^ing over last 2 hours more consistently 130-150's. Having difficulty obtaining BP. Dynamap BP to RW 80/50's. Given Midodrine crushed in pudding improving BP enough to IV Metoprolol for HR. Pt is alert when awakened and denies lightheaded or dizziness. Pt. Has been sleeping well between cares.

## 2024-09-04 NOTE — PROGRESS NOTES
Writer contacted nephrology and cardiology regarding the patient's low blood pressure. Nephrology advised writer to reach out to cardiology. Cardiologist told writer that MAP goal is greater than 60 at this time. Provider told writer that it is okay to give scheduled dose of po metoprolol as long as MAP is greater than 60. Provider also ordered x1 dose of 10mg of Midodrine PRN if MAP drops below 60. Provider feels as though the benefits outweigh the risks of holding the patient's scheduled beta blocker at this time due to patient's a fib w/RVR.

## 2024-09-04 NOTE — PROGRESS NOTES
Care Management Follow Up    Length of Stay (days): 7    Expected Discharge Date: 09/06/2024     Concerns to be Addressed: discharge planning     Patient plan of care discussed at interdisciplinary rounds: Yes    Anticipated Discharge Disposition: Assisted Living, Transitional Care, Home Care              Anticipated Discharge Services: Home Care  Anticipated Discharge DME: None    Patient/family educated on Medicare website which has current facility and service quality ratings: yes  Education Provided on the Discharge Plan: Yes  Patient/Family in Agreement with the Plan: yes    Referrals Placed by CM/SW: Post Acute Facilities  Private pay costs discussed: Not applicable    Discussed  Partnership in Safe Discharge Planning  document with patient/family: No     Handoff Completed: No, handoff not indicated or clinically appropriate    Additional Information:  Received a call from CHIRAG Hopkins  from [facility]. Sandeep was wanting an update on the pt and JUAN DAVID. Sandeep stated that the pt will be receiving Skilled Nursing, full level care and they will be using a beti lift with pt since he's as assist of 2. Thay are able to accept pt back when pt is ready for discharge and they will allow hospice in the facility if that is the route that the family decides to go.     Sandeep Harris [phone]     Next Steps: Keep Sandeep updated on JUAN DAVID.     1326~ Sandeep was updated on pt's passing.     David Thomas RN

## 2024-09-04 NOTE — PROGRESS NOTES
Called to pronounce pt    Pt seen with family in the room  No heart beat or respiration ausc    Time of death:  1247pm

## 2024-09-04 NOTE — PROGRESS NOTES
RENAL PROGRESS NOTE    PLAN:   Discussed at length with pts son Austin and Dr Huff=Lou.  Pt increasingly HoTN, RVRv-tach runs, worsening leukocytosis; son has decided to pursue comfort cares after discussions with his father's care team.  Dialysis would be near impossible in this setting; stopping dialysis indefinitely  Renal will sign off        ASSESSMENT   Disp:  Transitioning to comfort cares, pt on pressor support until his family can come to see him.      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  Runs now at [facility] under Cares of Dr Helm, recently established  MWF 3.5 TT, K3 bath, EDW 81kg   S/P Thrombectomy 8/12/24 at [facility] , this arm access has not been utilized since this time.    Has CVC, placed 8/14 following fracture of arm     Access:   L AVG creation 6/2022 (by Dr Rebolledo, [place]?)   Unclear functional status of AVG, throbectomy 8/12 ([facility]) and not cannulated since this time, utilizing cvc.  Has flow, fistulogram aborted yesterday d/t pt instability      Hypokalemia  Liberalize diet, high K dialysate as needed.      Hypervolemia  Poor HD runs outpt due to hypotension  Not overtly overloaded, but if wt is accurate, >6kg above target (exam not convincing of this)       MBD-CKD -   phos at goal,  very poor intake, calcium with correction lower end of normal, not on binders     HTN -   Variable BP, on scheduled oral pressors , back on Metop for attempt at rate control.          DM2 - no noted diabetic medications. Hypoglycemia noted, ER/hospital following.       Afib / Vtach -   metoprolol, amiodarone infusion   Cardiology following     HLD- statin     Thyroid disease -   levothyroxine   TSH very high, ? Taking medication as Rx PTA       SUBJECTIVE:    As above, overall decline overnoc, HOTN now on pressors, RVR, runs fo v-tach persist.  Pt less interactive, reportedly told his DIL last night that he was  dying.  Discussed issues over the phone with his son (as did DR Refugio Blake), MMP, inability to do dialysis with cardiac issues; going on comfort cares.     OBJECTIVE:  Physical Exam   Temp: 97.6  F (36.4  C) Temp src: Axillary BP: 93/60 Pulse: 105   Resp: 20 SpO2: 90 % O2 Device: None (Room air)    Vitals:    24 0041 24 0612 24 0600   Weight: 86.5 kg (190 lb 11.2 oz) 87.7 kg (193 lb 5.5 oz) 86.5 kg (190 lb 11.2 oz)     Vital Signs with Ranges  Temp:  [97.5  F (36.4  C)-98.6  F (37  C)] 97.6  F (36.4  C)  Pulse:  [] 105  Resp:  [14-24] 20  BP: ()/(26-91) 93/60  SpO2:  [78 %-100 %] 90 %  I/O last 3 completed shifts:  In: 400 [P.O.:300; I.V.:100]  Out: -     Temp (24hrs), Av.9  F (36.6  C), Min:97.5  F (36.4  C), Max:98.5  F (36.9  C)      Patient Vitals for the past 72 hrs:   Weight   24 0600 86.5 kg (190 lb 11.2 oz)   24 0612 87.7 kg (193 lb 5.5 oz)   24 0041 86.5 kg (190 lb 11.2 oz)   24 1400 86.5 kg (190 lb 11.2 oz)     Intake/Output Summary (Last 24 hours) at 9/3/2024 0912  Last data filed at 9/3/2024 0400  Gross per 24 hour   Intake 660 ml   Output 50 ml   Net 610 ml       PHYSICAL EXAM:  General - resting, does open eyes and engage briefly, denies pain, no SOB,   Cardiovascular - rate 150's, runs of v-tach, afib, sBP 60's  Respiratory - on 02 per :NC        LABORATORY STUDIES:     Recent Labs   Lab 24  0423 24  0340 24  0517 24  0857 24  1206 24  0519 24  1818 24  1336   WBC 25.0* 17.8* 16.4* 17.5*  --  8.9  --  10.2   RBC 3.28* 2.70* 2.43* 2.58*  --  2.37*  --  2.22*   HGB 10.7* 8.9* 8.0* 8.6* 8.6* 7.8*  7.8*   < > 7.2*   HCT 31.7* 26.3* 23.8* 25.2*  --  22.9*  --  21.5*    141* 156 188  --  234  --  328    < > = values in this interval not displayed.       Basic Metabolic Panel:  Recent Labs   Lab 24  0821 24  0423 24  2102 24  1753 24  1228 24  0340  09/02/24  1206 09/02/24  0953 09/02/24  0759 09/02/24 0517 09/01/24  1002 09/01/24  0525 08/31/24  1202 08/31/24  0541 08/30/24  0846 08/30/24  0519 08/29/24  1647 08/29/24  1257   NA  --  137  --   --   --  138  --   --   --  135  --  138  --   --   --  133*  --  135   POTASSIUM  --  4.4  --   --   --  3.8  --  3.5  --  3.3*  --  3.6  3.6  --  4.0   < > 3.4  --  3.4   CHLORIDE  --  99  --   --   --  99  --   --   --  99  --  99  --   --   --  98  --  94*   CO2  --  19*  --   --   --  21*  --   --   --  22  --  22  --   --   --  25  --  25   BUN  --  51.9*  --   --   --  41.1*  --   --   --  36.5*  --  25.1*  --   --   --  24.4*  --  38.9*   CR  --  3.51*  --   --   --  2.98*  --   --   --  2.95*  --  2.44*  --   --   --  2.77*  --  3.92*   GLC 88 99 102* 95 74 86   < >  --    < > 85   < > 126*   < > 174*   < > 189*   < > 119*  121*   MELISSA  --  7.9*  --   --   --  7.8*  --   --   --  8.0*  --  8.1*  --   --   --  7.7*  --  6.7*    < > = values in this interval not displayed.       INR  Recent Labs   Lab 09/04/24 0423 09/03/24 0340 09/02/24 0517 09/01/24 0525   INR 3.31* 2.88* 2.40* 1.66*        Recent Labs   Lab Test 09/04/24 0423 09/03/24 0340   INR 3.31* 2.88*   WBC 25.0* 17.8*   HGB 10.7* 8.9*    141*       Personally reviewed current labs      Jesusita Leger, [facility]  [facility]  [phone]

## 2024-09-04 NOTE — PROGRESS NOTES
Patient body picked up by  home.  Son took belongings home, dentures were sent with patient.  Marian Meyer RN

## 2024-09-04 NOTE — PLAN OF CARE
Speech Language Therapy Discharge Summary    Reason for therapy discharge:    All goals and outcomes met, no further needs identified.    Progress towards therapy goal(s). See [facility] for goal details.  Goals met    Therapy recommendation(s):    No further therapy is recommended.Pt with low BP upon entering room. Pt able to answer questions. He states his mouth still hurts with dentures. With gloved hand, I put pressure all around inside of mouth/gums and pt denied pain. He states he does not want dentures in and wants pureed food. Nurse had just given him sips of water with no overt sx's aspiraiton. Will keep pt on puree (per pt choice) with thin liquids and dc services as pt is happy with pureed textures.     Goal Outcome Evaluation:

## 2024-09-04 NOTE — PROGRESS NOTES
Pulmonary Progress Note  9/4/2024      Admit Date: 8/28/2024  CODE: No CPR- Do NOT Intubate    Reason for Consult: recurrent hypotension, shock    Assessment/Plan:   74M w/ cachexia, ESRD on HD, HFrEF, short gut syndrome, multiple other issues, presented with septic shock with unclear source. Improved initially but this morning developed afib/RVR with recurrent shock requiring pressors. Overall appears to be declining significantly and will not be able to tolerate HD due to extreme hypotension.   Discussed case with [facility], nephrology, RN. Family en route and likely to transition to comfort care later today.     Recommendations:  - keep SpO2 94-96%, avoid hyperoxia  - continue phenylephrine for MAP goal 60 or greater  - continue abx per primary team  - continue amio for afib; defer to cardiology for further management  - DNR/DNI code status noted  - planning for transition to comfort care later today, per RN and primary team.    We'll continue to follow for now.     Rodrigo (Jony) MD Kyle  [facility] ([facility])  Send me a secure message using [employer] [phone]  Fax [phone]     Subjective/Interim Events:   Asked to see patient by RN staff due to extreme hypotension with MAPs in 40s. Afib/RVR with HR in 160s.  Patient is lethargic but arousable.  Denies pain.    Amio bolus/drip, phenylephrine started.       Medications:     Current Facility-Administered Medications   Medication Dose Route Frequency Provider Last Rate Last Admin    amiodarone (NEXTERONE) 1.8 mg/mL in dextrose 5% 200 mL ADULT STANDARD infusion  1 mg/min Intravenous Continuous Rodrigo Wright MD 33.3 mL/hr at 09/04/24 0922 1 mg/min at 09/04/24 0922    amiodarone (NEXTERONE) 1.8 mg/mL in dextrose 5% 200 mL ADULT STANDARD infusion  0.5 mg/min Intravenous Continuous Rodrigo Wright MD        phenylephrine (ИВАН-SYNEPHRINE) 50 mg in NaCl 0.9 % 250 mL infusion  0.1-6 mcg/kg/min (Dosing Weight) Intravenous  Continuous Rodrigo Wright MD 68.6 mL/hr at 09/04/24 0943 3 mcg/kg/min at 09/04/24 0943     Current Facility-Administered Medications   Medication Dose Route Frequency Provider Last Rate Last Admin    acetaminophen (TYLENOL) oral liquid 975 mg  975 mg Oral TID Hardeep Major DO   975 mg at 09/03/24 2117    amiodarone (PACERONE) tablet 200 mg  200 mg Oral Daily David Donis MD   200 mg at 09/03/24 0834    aspirin EC tablet 81 mg  81 mg Oral BID Jose Hamilton MD   81 mg at 09/03/24 2116    atorvastatin (LIPITOR) tablet 10 mg  10 mg Oral At Bedtime Jose Hamilton MD   10 mg at 09/03/24 2116    ceFEPIme (MAXIPIME) 2 g vial to attach to  mL bag for ADULTS or NS 50 mL bag for PEDS  2 g Intravenous Q24H Mariah Liz MD   2 g at 09/03/24 1213    collagenase (SANTYL) ointment   Topical Daily Jose Hamilton MD   Given at 09/03/24 0846    hydrocortisone sodium succinate PF (solu-CORTEF) injection 25 mg  25 mg Intravenous Q8H Mariah Liz MD   25 mg at 09/04/24 0036    levothyroxine (SYNTHROID/LEVOTHROID) tablet 50 mcg  50 mcg Oral QAM AC Jose Hamilton MD   50 mcg at 09/03/24 0632    lidocaine (PF) (XYLOCAINE) 1 % injection 5 mL  5 mL Subcutaneous During Dialysis/CRRT (from stock) Jesusita Leger, NP        metoprolol tartrate (LOPRESSOR) tablet 25 mg  25 mg Oral or Feeding Tube BID David Donis MD   25 mg at 09/03/24 2116    midodrine (PROAMATINE) tablet 10 mg  10 mg Oral TID w/meals Idania Crenshaw MD   10 mg at 09/04/24 0830    oxyBUTYnin (DITROPAN) tablet 5 mg  5 mg Oral QAM Jose Hamilton MD   5 mg at 09/03/24 0834    pantoprazole (PROTONIX) EC tablet 40 mg  40 mg Oral BID AC Hardeep Major DO   40 mg at 09/03/24 1712    polyethylene glycol (MIRALAX) Packet 17 g  17 g Oral Daily Jose Hamilton MD   17 g at 09/01/24 0838    sodium chloride (PF) 0.9% PF flush 3 mL  3 mL Intracatheter Q8H Hardeep Major, DO   3 mL at 09/03/24 2123    sodium chloride (PF) 0.9% PF flush 3 mL  3 mL Intracatheter Q8H  Jose Hamilton MD   3 mL at 09/04/24 0943    urea (GORMEL) 20 % cream CREA   Topical Daily Jose Hamilton MD   Given at 09/04/24 0942    Warfarin Dose Required Daily - Pharmacist Managed  1 each Oral See Admin Instructions Mariah Liz MD        wound support modular (EXPEDITE) bottle 60 mL  60 mL Oral Daily Orlando Villasenor RD   60 mL at 09/03/24 0837         Exam/Data:   Vitals  /59   Pulse 120   Temp 97.6  F (36.4  C) (Axillary)   Resp 20   Wt 86.5 kg (190 lb 11.2 oz)   SpO2 99%   BMI 25.86 kg/m    BP - Mean:  [35-97] 85  I/O last 3 completed shifts:  In: 400 [P.O.:300; I.V.:100]  Out: -   Weight change: -1.2 kg (-2 lb 10.3 oz)  [unfilled]  Resp: 20    EXAM:  Physical Exam  Gen: awake, alert, oriented, no distress  HEENT: NT, no ALEX  CV: RRR, no m/g/r  Resp: diminished ant. No w/r/r   Abd: soft, nontender, BS+  Skin: no rashes or lesions  Ext: no edema  Neuro: PERRL, nonfocal exam    ROS:  A 10-system review was obtained and is negative with the exception of the symptoms noted above.    DATA:    PFT DATA   None available    Micro  PCT 1.01 on 9/2  MRSA swab neg  Blood NGTD   Pleural fluid cx neg    Pleural fluid 8/30  Total nuc cells 4817, 88% PMN  Glucose 182    pH 8.0  Protein 1.8  Cytology pending      IMAGING:   CT Chest Abdomen Pelvis w/o Contrast    Result Date: 8/29/2024  EXAM: CT CHEST ABDOMEN PELVIS W/O CONTRAST LOCATION: [facility] DATE: 8/29/2024 INDICATION: possible sepsis without clear source COMPARISON: 10/5/2022 TECHNIQUE: CT scan of the chest, abdomen, and pelvis was performed without IV contrast. Multiplanar reformats were obtained. Dose reduction techniques were used. CONTRAST: None. FINDINGS: LUNGS AND PLEURA: Large right pleural effusion and moderate left pleural effusion with bilateral lower lobe consolidation, as well as consolidation in the middle lobe and lingula. Extensive endobronchial opacities throughout the right lower lobe airways and, to  a lesser extent, the left lower lobe and middle lobe. No pneumothorax. MEDIASTINUM/AXILLAE: Right neck central venous catheter tip at the SVC-RA junction. Increased fluid in the esophagus. Right atrial enlargement. CORONARY ARTERY CALCIFICATION: Severe. HEPATOBILIARY: No significant mass or bile duct dilatation. Cholecystectomy. PANCREAS: Normal. SPLEEN: Normal. ADRENAL GLANDS: Normal. KIDNEYS/BLADDER: Normal. BOWEL: No obstruction or inflammatory change. Increased fluid throughout the colon. Postsurgical changes in the small bowel and at the gastroesophageal junction. LYMPH NODES: Normal. VASCULATURE: Mild atherosclerotic calcification of the abdominal aorta with circumferential calcification of small and medium vessels in a pattern suggesting arteriosclerosis. Right groin vascular catheter tip in the right external iliac vein. PELVIC ORGANS: Normal. MUSCULOSKELETAL: Diffuse body wall edema. Osteopenia. Degenerative changes in the spine with unchanged multilevel compression deformities. Partially healed sternal and right-sided rib fractures.     IMPRESSION: 1.  Large right pleural effusion and moderate left pleural effusion with bilateral lower lobe, middle lobe, and lingular consolidation, which could represent compressive atelectasis or pneumonia. 2.  Extensive endobronchial opacities in the right lower lobe, left lower lobe, and middle lobe, which could be from aspiration, mucous plugging, or retained secretions. 3.  Increased fluid in the esophagus suggesting gastroesophageal reflux. 4.  Increased fluid in the colon, which is a nonspecific marker of diarrhea.     Echocardiogram Complete    Result Date: 2024  [id number]^[address] [address]  Name: CHUY RADER MRN: [id number] : 1950 Study Date: 2024 08:42 AM Age: 74 yrs Gender: Male Patient Location: [facility] Reason For Study: Stress Paroxysmal Ventricular Tachycardia  Ordering Physician: BREA TAFOYA Performed By: RUDDY  BSA: 2.0 m2 Height: 72 in Weight: 168 lb HR: 108 BP: 101/72 mmHg ______________________________________________________________________________ Procedure Complete Portable Echo Adult. Definity (NDC #43965-409) given intravenously. Lot [id number], Exp July 2025. Technically difficult study. Compared to the prior study dated 6/20/2022 (report in Care Everywhere) the EF has decreased and the MR has increased. ______________________________________________________________________________ Interpretation Summary  The left ventricle is normal in size. The visual ejection fraction is 35-40%. There is moderate global hypokinesia of the left ventricle. The right ventricle is moderately dilated. Moderately decreased right ventricular systolic function There is mild to moderate (1-2+) mitral regurgitation. There is mild (1+) aortic regurgitation. RVSP 29mmHg + RAP Compared to the prior study dated 6/20/2022 (report in Care Everywhere) the EF has decreased and the MR has increased ______________________________________________________________________________ Left Ventricle The left ventricle is normal in size. There is normal left ventricular wall thickness. Left ventricular diastolic function is indeterminate. Diastolic Doppler findings (E/E' ratio and/or other parameters) suggest left ventricular filling pressures are normal. The visual ejection fraction is 35-40%. There is moderate global hypokinesia of the left ventricle.  Right Ventricle The right ventricle is moderately dilated. Moderately decreased right ventricular systolic function.  Atria The left atrium is severely dilated. The right atrium is moderately dilated. There is no color Doppler evidence of an atrial shunt.  Mitral Valve There is mild mitral annular calcification. The mitral valve leaflets are mildly thickened. There is mild to moderate (1-2+) mitral regurgitation. There is no mitral valve stenosis.  Tricuspid  Valve The tricuspid valve is not well visualized, but is grossly normal. There is mild (1+) tricuspid regurgitation. RVSP 29mmHg + RAP.  Aortic Valve There is mild trileaflet aortic sclerosis. There is mild (1+) aortic regurgitation. No aortic stenosis is present.  Pulmonic Valve The pulmonic valve is not well visualized. There is no pulmonic valvular regurgitation. There is no pulmonic valvular stenosis.  Vessels The aorta root cannot be assessed. The thoracic aorta cannot be assessed. Inferior vena cava not well visualized for estimation of right atrial pressure.  Pericardium There is no pericardial effusion.  Rhythm The rhythm was atrial fibrillation. ______________________________________________________________________________ MMode/2D Measurements & Calculations  IVSd: 0.73 cm LVIDd: 4.7 cm LVIDs: 3.7 cm LVPWd: 0.81 cm FS: 22.5 % LV mass(C)d: 117.3 grams LV mass(C)dI: 59.3 grams/m2 LA dimension: 5.2 cm LVOT diam: 2.4 cm LVOT area: 4.5 cm2 EF Biplane: 45.9 % LA Volume (BP): 130.0 ml LA Volume Index (BP): 65.7 ml/m2  LA Volume Indexed (AL/bp): 72.1 ml/m2 RV Base: 5.6 cm RWT: 0.34 TAPSE: 1.7 cm  Time Measurements MM HR: 108.0 BPM  Doppler Measurements & Calculations MV E max alexi: 97.3 cm/sec  MV dec time: 0.21 sec Ao V2 max: 143.0 cm/sec Ao max P.0 mmHg Ao V2 mean: 95.9 cm/sec Ao mean PG: 3.8 mmHg Ao V2 VTI: 26.7 cm SHALOM(I,D): 2.3 cm2 SHALOM(V,D): 2.4 cm2 AI P1/2t: 525.5 msec LV V1 max P.4 mmHg LV V1 max: 77.8 cm/sec LV V1 VTI: 13.8 cm MR PISA: 2.4 cm2 MR ERO: 0.15 cm2 MR volume: 24.4 ml SV(LVOT): 62.0 ml SI(LVOT): 31.4 ml/m2 PA acc time: 0.09 sec TR max alexi: 278.3 cm/sec TR max P.4 mmHg AV Alexi Ratio (DI): 0.54 SHALOM Index (cm2/m2): 1.2 E/E': 5.8 E/E' av.4 Lateral E/e': 5.8 Medial E/e': 9.0 Peak E' Alexi: 16.9 cm/sec RV S Alexi: 11.9 cm/sec  ______________________________________________________________________________ Report approved by: Stella Pagan 2024 11:42 AM       XR Pelvis Port   Views    Result Date: 8/29/2024  EXAM: XR PELVIS PORT 1/2 VIEWS LOCATION: [facility] DATE: 8/29/2024 INDICATION: Femoral central line placement verification COMPARISON: None.     IMPRESSION: Right femoral catheter terminates in the right hemipelvis. No acute fracture or dislocation. Mild degenerative narrowing of bilateral hip joints. Significant bilateral iliofemoral arterial atherosclerotic calcification.    XR Chest Port 1 View    Result Date: 8/28/2024  EXAM: XR CHEST PORT 1 VIEW LOCATION: [facility] DATE: 8/28/2024 INDICATION: tachycardia COMPARISON: 10/5/2022     IMPRESSION: There is a tunneled dual lumen right IJ catheter with the tip over the right atrium. There are pleural effusions with atelectasis. Cannot assess for underlying airspace disease. No pneumothorax. The heart is not well assessed.    Head CT w/o contrast    Result Date: 8/28/2024  EXAM: CT HEAD W/O CONTRAST LOCATION: [facility] DATE: 8/28/2024 INDICATION: altered mental status at HD COMPARISON: None. TECHNIQUE: Routine CT Head without IV contrast. Multiplanar reformats. Dose reduction techniques were used. FINDINGS: INTRACRANIAL CONTENTS: No intracranial hemorrhage, extraaxial collection, or mass effect.  No CT evidence of acute infarct. Mild to moderate presumed chronic small vessel ischemic changes. Left frontal and left occipital encephalomalacia/gliosis. Mild generalized cerebral atrophy. Normal ventricles and sulci. VISUALIZED ORBITS/SINUSES/MASTOIDS: No intraorbital abnormality. Mild mucosal thickening scattered about the paranasal sinuses. No middle ear or mastoid effusion. BONES/SOFT TISSUES: No acute abnormality.     IMPRESSION: 1.  No acute intracranial process. 2.  Chronic small vessel ischemic disease and generalized cerebral atrophy. 3.  Encephalomalacia in the left frontal and left occipital lobes.

## 2024-09-06 LAB — BACTERIA PLR CULT: NORMAL

## 2024-09-08 LAB — BACTERIA BLD CULT: NO GROWTH

## 2024-09-22 LAB
PATH REPORT.COMMENTS IMP SPEC: NORMAL
PATH REPORT.COMMENTS IMP SPEC: NORMAL
PATH REPORT.FINAL DX SPEC: NORMAL
PATH REPORT.GROSS SPEC: NORMAL
PATH REPORT.RELEVANT HX SPEC: NORMAL

## (undated) RX ORDER — LIDOCAINE HYDROCHLORIDE 10 MG/ML
INJECTION, SOLUTION INFILTRATION; PERINEURAL
Status: DISPENSED
Start: 2024-01-01

## (undated) RX ORDER — HEPARIN SODIUM 200 [USP'U]/100ML
INJECTION, SOLUTION INTRAVENOUS
Status: DISPENSED
Start: 2024-01-01